# Patient Record
Sex: MALE | Race: WHITE | NOT HISPANIC OR LATINO | Employment: FULL TIME | URBAN - METROPOLITAN AREA
[De-identification: names, ages, dates, MRNs, and addresses within clinical notes are randomized per-mention and may not be internally consistent; named-entity substitution may affect disease eponyms.]

---

## 2021-09-15 ENCOUNTER — OFFICE VISIT (OUTPATIENT)
Dept: ENDOCRINOLOGY | Facility: CLINIC | Age: 31
End: 2021-09-15
Payer: COMMERCIAL

## 2021-09-15 VITALS
DIASTOLIC BLOOD PRESSURE: 80 MMHG | SYSTOLIC BLOOD PRESSURE: 130 MMHG | HEART RATE: 88 BPM | HEIGHT: 68 IN | OXYGEN SATURATION: 99 % | WEIGHT: 285.4 LBS | BODY MASS INDEX: 43.26 KG/M2

## 2021-09-15 DIAGNOSIS — E66.01 MORBID OBESITY (H): Primary | ICD-10-CM

## 2021-09-15 PROCEDURE — 99202 OFFICE O/P NEW SF 15 MIN: CPT | Performed by: SURGERY

## 2021-09-15 RX ORDER — BUPROPION HYDROCHLORIDE 300 MG/1
300 TABLET ORAL EVERY MORNING
COMMUNITY
End: 2021-10-22

## 2021-09-15 RX ORDER — DULOXETIN HYDROCHLORIDE 30 MG/1
30 CAPSULE, DELAYED RELEASE ORAL DAILY
COMMUNITY
End: 2021-10-22

## 2021-09-15 ASSESSMENT — ENCOUNTER SYMPTOMS
POLYPHAGIA: 1
DECREASED APPETITE: 0
FATIGUE: 1
CONSTIPATION: 1
VOMITING: 0
MYALGIAS: 0
JOINT SWELLING: 0
BLOOD IN STOOL: 0
CHILLS: 0
WEIGHT GAIN: 1
WHEEZING: 0
HALLUCINATIONS: 0
ARTHRALGIAS: 0
NECK PAIN: 0
JAUNDICE: 0
BACK PAIN: 1
COUGH: 0
RECTAL PAIN: 0
BLOATING: 1
WEIGHT LOSS: 0
NIGHT SWEATS: 1
INCREASED ENERGY: 0
ABDOMINAL PAIN: 0
MUSCLE CRAMPS: 0
DECREASED LIBIDO: 0
NAUSEA: 0
SNORES LOUDLY: 1
SPUTUM PRODUCTION: 0
DYSPNEA ON EXERTION: 0
SHORTNESS OF BREATH: 1
HEARTBURN: 0
POSTURAL DYSPNEA: 1
DIARRHEA: 0
COUGH DISTURBING SLEEP: 0
POLYDIPSIA: 1
BOWEL INCONTINENCE: 0
HOT FLASHES: 1
STIFFNESS: 0
ALTERED TEMPERATURE REGULATION: 1
FEVER: 0
MUSCLE WEAKNESS: 0
HEMOPTYSIS: 0

## 2021-09-15 ASSESSMENT — MIFFLIN-ST. JEOR: SCORE: 2055.13

## 2021-09-15 ASSESSMENT — PAIN SCALES - GENERAL: PAINLEVEL: NO PAIN (0)

## 2021-09-15 NOTE — NURSING NOTE
"Chief Complaint   Patient presents with     Consult     Consultation Bariatric Surgery had Adjustable band removed       Vitals:    09/15/21 0711   BP: 130/80   BP Location: Left arm   Patient Position: Chair   Cuff Size: Adult Large   Pulse: 88   SpO2: 99%   Weight: 129.5 kg (285 lb 6.4 oz)   Height: 1.715 m (5' 7.5\")       Body mass index is 44.04 kg/m .                            "

## 2021-09-15 NOTE — PROGRESS NOTES
This is a 31-year-old gentleman who underwent placement of a laparoscopic adjustable band in [place] in 2009. Last year while traveling on vacation he did have signs and symptoms of obstruction so in early 2001 he had the band removed in [place]. Since that time he has gained a significant amount of weight almost 90 pounds. He and his wife are currently expecting twins that are due in mid March. He denies diabetes or hypertension but has a strong history of these with a significant history of diabetes from his father who has also had a heart attack. The patient is interested in losing weight and eager to consider surgery. On physical examination the patient has a subxiphoid horizontal scar of approximately 3 cm with other port site scars that are well-healed.    Assessment and plan we will need to reviewed the patient's previous operative note he will obtain this from [place]. In addition I like to have him see Estela in our clinic for medical weight loss. He may be an excellent candidate for some of the GLP-1 analogs. In addition to that I like to perform an upper endoscopy to evaluate his stomach. We will give medical management first priority and trying to help him achieve his weight loss goals. If however he is not successful or plateaus early we will consider conversion to another procedure. It is unclear to me what the best pathway is. Much of this will be dictated by risk assessment. We will plan to see him back in clinic after his initial work-up with the operative note and the results of the endoscopy.

## 2021-09-15 NOTE — PATIENT INSTRUCTIONS
Your provider has ordered an Upper Endoscopy (EGD).  Please contact [facility] after 9/17/21, at [phone],  to schedule.

## 2021-09-16 ENCOUNTER — TELEPHONE (OUTPATIENT)
Dept: SURGERY | Facility: CLINIC | Age: 31
End: 2021-09-16

## 2021-09-21 ENCOUNTER — TELEPHONE (OUTPATIENT)
Dept: GASTROENTEROLOGY | Facility: CLINIC | Age: 31
End: 2021-09-21

## 2021-09-21 DIAGNOSIS — Z11.59 ENCOUNTER FOR SCREENING FOR OTHER VIRAL DISEASES: ICD-10-CM

## 2021-09-21 NOTE — TELEPHONE ENCOUNTER
Screening Questions  1. Are you active on mychart? NO    2. What insurance is in the chart? [employer]    2.  Ordering/Referring Provider: Kameron Sanchez MD in [facility]    3. BMI 44.6    4. Are you on daily home oxygen? NO    5. Do you have a history of difficult airway? NO    6. Have you had a heart, lung, or liver transplant? NO    7. Are you currently on dialysis or have chronic kidney disease? NO    8. Have you had a stroke or Transient ischemic atttack (TIA) within 6 months? NO    9. In the past 6 months, have you had any heart related issues including cardiomyopathy or heart attack?         If yes, did it require cardiac stenting or other implantable device?NO    10. Do you have any implantable devices in your body (pacemaker, defib, LVAD)? NO    11. Do you take nitroglycerin? If yes, how often? NO    12. Are you currently taking any blood thinners?NO    13. Are you a diabetic? NO    14. (Females) Are you currently pregnant?   If yes, how many weeks?    15. Have you had a procedure in the past that was difficult to tolerate with conscious sedation? Any allergies to Fentanyl or Versed NO    16. Are you taking any scheduled prescription narcotics more than once daily? NO    17. Do you have any chemical dependencies such as alcohol, street drugs, or methadone? NO    18. Do you have any history of post-traumatic stress syndrome or mental health issues? NO    19. Do you transfer independently? YES    20.  Do you have any issues with constipation? NO    21. Preferred Pharmacy for Pre Prescription [facility]     Scheduling Details    Which Colonoscopy Prep was Sent?:   Procedure Scheduled: EGD  Provider/Surgeon: Dr. Durham  Date of Procedure: 10/07/2021  Location: [facility]  Caller (Please ask for phone number if not scheduled by patient): [facility]      Sedation Type: CS  Conscious Sedation- Needs  for 6 hours after the procedure  MAC/General-Needs  for 24 hours after procedure    Pre-op  Required at [facility] and OR for MAC sedation:   (if yes advise patient they will need a pre-op prior to procedure)      Is patient on blood thinners? -NO (If yes- inform patient to follow up with PCP or provider for follow up instructions)     Informed patient they will need an adult  YES  Cannot take any type of public or medical transportation alone    [facility] or [facility]ternally: YES    Confirmed Nurse will call to complete assessment YES    Additional comments: NO

## 2021-09-28 ENCOUNTER — TELEPHONE (OUTPATIENT)
Dept: GASTROENTEROLOGY | Facility: CLINIC | Age: 31
End: 2021-09-28

## 2021-09-28 NOTE — TELEPHONE ENCOUNTER
Covid test scheduled: 10-3    Arrival time: 1430    Facility location: [facility]    Sedation type: conscious    Bowel prep recommendation: N/a    Amy Branham RN    Instructions, policy, conscious sedation plan reviewed. Instructed patient to have someone stay with them for 6 hours post exam.

## 2021-10-06 ENCOUNTER — LAB (OUTPATIENT)
Dept: LAB | Facility: CLINIC | Age: 31
End: 2021-10-06
Attending: INTERNAL MEDICINE
Payer: COMMERCIAL

## 2021-10-06 DIAGNOSIS — Z11.59 ENCOUNTER FOR SCREENING FOR OTHER VIRAL DISEASES: ICD-10-CM

## 2021-10-06 LAB — SARS-COV-2 RNA RESP QL NAA+PROBE: NEGATIVE

## 2021-10-06 PROCEDURE — U0005 INFEC AGEN DETEC AMPLI PROBE: HCPCS

## 2021-10-06 PROCEDURE — U0003 INFECTIOUS AGENT DETECTION BY NUCLEIC ACID (DNA OR RNA); SEVERE ACUTE RESPIRATORY SYNDROME CORONAVIRUS 2 (SARS-COV-2) (CORONAVIRUS DISEASE [COVID-19]), AMPLIFIED PROBE TECHNIQUE, MAKING USE OF HIGH THROUGHPUT TECHNOLOGIES AS DESCRIBED BY CMS-2020-01-R: HCPCS

## 2021-10-07 ENCOUNTER — HOSPITAL ENCOUNTER (OUTPATIENT)
Facility: CLINIC | Age: 31
Discharge: HOME | End: 2021-10-07
Attending: INTERNAL MEDICINE | Admitting: INTERNAL MEDICINE
Payer: COMMERCIAL

## 2021-10-07 VITALS
HEART RATE: 77 BPM | BODY MASS INDEX: 44.98 KG/M2 | WEIGHT: 286.6 LBS | RESPIRATION RATE: 12 BRPM | DIASTOLIC BLOOD PRESSURE: 112 MMHG | OXYGEN SATURATION: 97 % | HEIGHT: 67 IN | SYSTOLIC BLOOD PRESSURE: 138 MMHG | TEMPERATURE: 97.3 F

## 2021-10-07 DIAGNOSIS — E66.01 MORBID OBESITY (H): Primary | ICD-10-CM

## 2021-10-07 LAB — UPPER GI ENDOSCOPY: NORMAL

## 2021-10-07 PROCEDURE — G0500 MOD SEDAT ENDO SERVICE >5YRS: HCPCS | Performed by: INTERNAL MEDICINE

## 2021-10-07 PROCEDURE — 250N000009 HC RX 250: Performed by: INTERNAL MEDICINE

## 2021-10-07 PROCEDURE — 250N000011 HC RX IP 250 OP 636: Performed by: INTERNAL MEDICINE

## 2021-10-07 PROCEDURE — 43235 EGD DIAGNOSTIC BRUSH WASH: CPT | Performed by: INTERNAL MEDICINE

## 2021-10-07 RX ORDER — PROCHLORPERAZINE MALEATE 10 MG
10 TABLET ORAL EVERY 6 HOURS PRN
Status: DISCONTINUED | OUTPATIENT
Start: 2021-10-07 | End: 2021-10-07 | Stop reason: HOSPADM

## 2021-10-07 RX ORDER — FENTANYL CITRATE 50 UG/ML
INJECTION, SOLUTION INTRAMUSCULAR; INTRAVENOUS PRN
Status: DISCONTINUED | OUTPATIENT
Start: 2021-10-07 | End: 2021-10-07 | Stop reason: HOSPADM

## 2021-10-07 RX ORDER — NALOXONE HYDROCHLORIDE 0.4 MG/ML
0.2 INJECTION, SOLUTION INTRAMUSCULAR; INTRAVENOUS; SUBCUTANEOUS
Status: DISCONTINUED | OUTPATIENT
Start: 2021-10-07 | End: 2021-10-07 | Stop reason: HOSPADM

## 2021-10-07 RX ORDER — FLUMAZENIL 0.1 MG/ML
0.2 INJECTION, SOLUTION INTRAVENOUS
Status: DISCONTINUED | OUTPATIENT
Start: 2021-10-07 | End: 2021-10-07 | Stop reason: HOSPADM

## 2021-10-07 RX ORDER — ONDANSETRON 2 MG/ML
4 INJECTION INTRAMUSCULAR; INTRAVENOUS
Status: DISCONTINUED | OUTPATIENT
Start: 2021-10-07 | End: 2021-10-07 | Stop reason: HOSPADM

## 2021-10-07 RX ORDER — ONDANSETRON 2 MG/ML
4 INJECTION INTRAMUSCULAR; INTRAVENOUS EVERY 6 HOURS PRN
Status: DISCONTINUED | OUTPATIENT
Start: 2021-10-07 | End: 2021-10-07 | Stop reason: HOSPADM

## 2021-10-07 RX ORDER — NALOXONE HYDROCHLORIDE 0.4 MG/ML
0.4 INJECTION, SOLUTION INTRAMUSCULAR; INTRAVENOUS; SUBCUTANEOUS
Status: DISCONTINUED | OUTPATIENT
Start: 2021-10-07 | End: 2021-10-07 | Stop reason: HOSPADM

## 2021-10-07 RX ORDER — LIDOCAINE 40 MG/G
CREAM TOPICAL
Status: DISCONTINUED | OUTPATIENT
Start: 2021-10-07 | End: 2021-10-07 | Stop reason: HOSPADM

## 2021-10-07 RX ORDER — ONDANSETRON 4 MG/1
4 TABLET, ORALLY DISINTEGRATING ORAL EVERY 6 HOURS PRN
Status: DISCONTINUED | OUTPATIENT
Start: 2021-10-07 | End: 2021-10-07 | Stop reason: HOSPADM

## 2021-10-07 RX ADMIN — MIDAZOLAM 1 MG: 1 INJECTION INTRAMUSCULAR; INTRAVENOUS at 15:44

## 2021-10-07 RX ADMIN — FENTANYL CITRATE 50 MCG: 50 INJECTION, SOLUTION INTRAMUSCULAR; INTRAVENOUS at 15:39

## 2021-10-07 RX ADMIN — MIDAZOLAM 2 MG: 1 INJECTION INTRAMUSCULAR; INTRAVENOUS at 15:38

## 2021-10-07 RX ADMIN — MIDAZOLAM 1 MG: 1 INJECTION INTRAMUSCULAR; INTRAVENOUS at 15:47

## 2021-10-07 RX ADMIN — FENTANYL CITRATE 25 MCG: 50 INJECTION, SOLUTION INTRAMUSCULAR; INTRAVENOUS at 15:44

## 2021-10-07 RX ADMIN — TOPICAL ANESTHETIC 1 SPRAY: 200 SPRAY DENTAL; PERIODONTAL at 15:38

## 2021-10-07 RX ADMIN — MIDAZOLAM 1 MG: 1 INJECTION INTRAMUSCULAR; INTRAVENOUS at 15:42

## 2021-10-07 ASSESSMENT — MIFFLIN-ST. JEOR: SCORE: 2218.63

## 2021-10-07 NOTE — OR NURSING
Procedure: Upper Endoscopy  Sedation: Conscious sedation (75 mcg fentanyl, 5 mg versed)  O2: 2 LPM NC during procedure  Tolerated: VS stable during and post procedure. Not c/o abdominal or chest pain.  Report: Given to Becky WILLSON  Pt to recovery area in stable condition, accompanied by DEMETRIS Daniels RN

## 2021-10-21 ENCOUNTER — TELEPHONE (OUTPATIENT)
Dept: ENDOCRINOLOGY | Facility: CLINIC | Age: 31
End: 2021-10-21

## 2021-10-21 NOTE — TELEPHONE ENCOUNTER
Patient interested in weight loss surgery    Alcides Beauchamp    Instructed to check with insurance company regarding exclusions prior to first appt.    Scheduled to see CNP or TELLY at 8:00     Spoke to Patient:12:21 pm      Can find information on bariatrix products at: [email or web address]    Instructed to view seminar and complete pre-visit questionnaire prior to visit at [email or web address].    [phone] contact center phone number      TRISTON VILLANUEVA CMA

## 2021-10-22 ENCOUNTER — TELEPHONE (OUTPATIENT)
Dept: ENDOCRINOLOGY | Facility: CLINIC | Age: 31
End: 2021-10-22

## 2021-10-22 ENCOUNTER — OFFICE VISIT (OUTPATIENT)
Dept: ENDOCRINOLOGY | Facility: CLINIC | Age: 31
End: 2021-10-22
Payer: COMMERCIAL

## 2021-10-22 ENCOUNTER — LAB (OUTPATIENT)
Dept: LAB | Facility: CLINIC | Age: 31
End: 2021-10-22
Payer: COMMERCIAL

## 2021-10-22 VITALS
DIASTOLIC BLOOD PRESSURE: 90 MMHG | OXYGEN SATURATION: 98 % | BODY MASS INDEX: 44.72 KG/M2 | WEIGHT: 295.1 LBS | HEIGHT: 68 IN | HEART RATE: 88 BPM | SYSTOLIC BLOOD PRESSURE: 143 MMHG

## 2021-10-22 DIAGNOSIS — E66.01 MORBID OBESITY (H): ICD-10-CM

## 2021-10-22 DIAGNOSIS — E66.01 CLASS 3 SEVERE OBESITY DUE TO EXCESS CALORIES WITH SERIOUS COMORBIDITY AND BODY MASS INDEX (BMI) OF 45.0 TO 49.9 IN ADULT (H): Primary | ICD-10-CM

## 2021-10-22 DIAGNOSIS — E66.813 CLASS 3 SEVERE OBESITY DUE TO EXCESS CALORIES WITH SERIOUS COMORBIDITY AND BODY MASS INDEX (BMI) OF 45.0 TO 49.9 IN ADULT (H): Primary | ICD-10-CM

## 2021-10-22 LAB
ALBUMIN SERPL-MCNC: 3.8 G/DL (ref 3.4–5)
ALP SERPL-CCNC: 82 U/L (ref 40–150)
ALT SERPL W P-5'-P-CCNC: 83 U/L (ref 0–70)
ANION GAP SERPL CALCULATED.3IONS-SCNC: 4 MMOL/L (ref 3–14)
AST SERPL W P-5'-P-CCNC: 34 U/L (ref 0–45)
BILIRUB SERPL-MCNC: 0.3 MG/DL (ref 0.2–1.3)
BUN SERPL-MCNC: 13 MG/DL (ref 7–30)
CALCIUM SERPL-MCNC: 9.3 MG/DL (ref 8.5–10.1)
CHLORIDE BLD-SCNC: 108 MMOL/L (ref 94–109)
CHOLEST SERPL-MCNC: 221 MG/DL
CO2 SERPL-SCNC: 29 MMOL/L (ref 20–32)
CREAT SERPL-MCNC: 0.72 MG/DL (ref 0.66–1.25)
DEPRECATED CALCIDIOL+CALCIFEROL SERPL-MC: 12 UG/L (ref 20–75)
ERYTHROCYTE [DISTWIDTH] IN BLOOD BY AUTOMATED COUNT: 13.3 % (ref 10–15)
FASTING STATUS PATIENT QL REPORTED: YES
GFR SERPL CREATININE-BSD FRML MDRD: >90 ML/MIN/1.73M2
GLUCOSE BLD-MCNC: 89 MG/DL (ref 70–99)
HBA1C MFR BLD: 5.7 % (ref 0–5.6)
HCT VFR BLD AUTO: 44.7 % (ref 40–53)
HDLC SERPL-MCNC: 51 MG/DL
HGB BLD-MCNC: 14.7 G/DL (ref 13.3–17.7)
LDLC SERPL CALC-MCNC: 144 MG/DL
MCH RBC QN AUTO: 26.6 PG (ref 26.5–33)
MCHC RBC AUTO-ENTMCNC: 32.9 G/DL (ref 31.5–36.5)
MCV RBC AUTO: 81 FL (ref 78–100)
NONHDLC SERPL-MCNC: 170 MG/DL
PLATELET # BLD AUTO: 319 10E3/UL (ref 150–450)
POTASSIUM BLD-SCNC: 4.1 MMOL/L (ref 3.4–5.3)
PROT SERPL-MCNC: 7.8 G/DL (ref 6.8–8.8)
PTH-INTACT SERPL-MCNC: 25 PG/ML (ref 18–80)
RBC # BLD AUTO: 5.52 10E6/UL (ref 4.4–5.9)
SODIUM SERPL-SCNC: 141 MMOL/L (ref 133–144)
TRIGL SERPL-MCNC: 130 MG/DL
TSH SERPL DL<=0.005 MIU/L-ACNC: 1.39 MU/L (ref 0.4–4)
WBC # BLD AUTO: 7.4 10E3/UL (ref 4–11)

## 2021-10-22 PROCEDURE — 80061 LIPID PANEL: CPT | Performed by: PATHOLOGY

## 2021-10-22 PROCEDURE — 36415 COLL VENOUS BLD VENIPUNCTURE: CPT | Performed by: PATHOLOGY

## 2021-10-22 PROCEDURE — 99000 SPECIMEN HANDLING OFFICE-LAB: CPT | Performed by: PATHOLOGY

## 2021-10-22 PROCEDURE — 83970 ASSAY OF PARATHORMONE: CPT | Mod: 90 | Performed by: PATHOLOGY

## 2021-10-22 PROCEDURE — 83036 HEMOGLOBIN GLYCOSYLATED A1C: CPT | Performed by: PATHOLOGY

## 2021-10-22 PROCEDURE — 80053 COMPREHEN METABOLIC PANEL: CPT | Performed by: PATHOLOGY

## 2021-10-22 PROCEDURE — 99215 OFFICE O/P EST HI 40 MIN: CPT | Performed by: PHYSICIAN ASSISTANT

## 2021-10-22 PROCEDURE — 85027 COMPLETE CBC AUTOMATED: CPT | Performed by: PATHOLOGY

## 2021-10-22 PROCEDURE — 82306 VITAMIN D 25 HYDROXY: CPT | Mod: 90 | Performed by: PATHOLOGY

## 2021-10-22 PROCEDURE — 84443 ASSAY THYROID STIM HORMONE: CPT | Performed by: PATHOLOGY

## 2021-10-22 RX ORDER — DULOXETIN HYDROCHLORIDE 30 MG/1
30 CAPSULE, DELAYED RELEASE ORAL DAILY
COMMUNITY
Start: 2021-10-22 | End: 2023-01-11

## 2021-10-22 ASSESSMENT — MIFFLIN-ST. JEOR: SCORE: 2265.12

## 2021-10-22 ASSESSMENT — PAIN SCALES - GENERAL: PAINLEVEL: NO PAIN (0)

## 2021-10-22 NOTE — LETTER
"10/22/2021       RE: Alcides Beauchamp  [address]  [facility] [address]     Dear Colleague,    Thank you for referring your patient, Alcides Beauchamp, to the [facility]. Please see a copy of my visit note below.    40 minutes spent on the date of the encounter doing chart review, history and exam, documentation and further activities per the note    New Bariatric Surgery Consultation Note    2021    RE: Alcides Beauchamp  MR#: [id number]  : 1990      Referring provider:       10/21/2021   Who referred you? asnjay [email or web address]       Chief Complaint/Reason for visit: evaluation for possible weight loss surgery    Dear Jami, Sanjay Tilley MD (General),    I had the pleasure of seeing your patient, Alcides Beauchamp, to evaluate his obesity and consider him for possible weight loss surgery. As you know, Alcides Beauchamp is 31 year old.  He has a height of 5' 7.5\", a weight of 295 lbs 1.6 oz, and calculated Body mass index is 45.54 kg/m .    Assessment & Plan   Problem List Items Addressed This Visit        Endocrine Diagnoses    Morbid obesity (H) - Primary          HISTORY OF PRESENT ILLNESS:  Weight Loss History Reviewed with Patient 10/21/2021   How long have you been overweight? Since puberty   What is the most that you have ever weighed? 300   What is the most weight you have lost? 120   I have tried the following methods to lose weight Weight Loss Surgery   I have tried the following weight loss medications? (Check all that apply) -   Have you ever had weight loss surgery? Yes   Please select the type of weight loss surgery you had (select all that apply): Lap band [place]  and removal  in [place]   Lost 130 lbs with lap band placed in [place], removed in  and has gained almost 100 lbs in the last year.  Regained 90 lbs since lap band removal   EGD 10/7/21 Dr Durham Large Type III paraesophageal " hernia  Recalls trying phentermine/topiramate with PCP that he reports wasn't effective  A1C 5.3 2/16/21, need recheck      PLAN:  Plan for possible sleeve gastrectomy with hiatal hernia repiar, to discuss with Dr Sanchez  Labs today 1st floor  Consider Ozempic if preDM or Wegovy if A1C normal  RD visit soon (oct) video  RD visit in 1 month  Dr Sanchez in 1 month in person   [facility] in 3 months  Call to schedule psych eval asap      CO-MORBIDITIES OF OBESITY INCLUDE:     10/21/2021   I have the following health issues associated with obesity: Heart Disease, High Blood Pressure       PAST MEDICAL HISTORY:  No past medical history on file.    PAST SURGICAL HISTORY:  Past Surgical History:   Procedure Laterality Date     ESOPHAGOSCOPY, GASTROSCOPY, DUODENOSCOPY (EGD), COMBINED N/A 10/7/2021    Procedure: ESOPHAGOGASTRODUODENOSCOPY (EGD);  Surgeon: Liss Durham MD;  Location: [facility]       FAMILY HISTORY:   No family history on file.    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 No   Who do you have in your support network that can be available to help you for the first 2 weeks after surgery? wife and family   Who can you count on for support throughout your weight loss surgery journey? wife and family   Can you afford 3 meals a day?  No   Can you afford 50-60 dollars a month for vitamins? Yes       HABITS:     10/21/2021   How often do you drink alcohol? Monthly or less   If you do drink alcohol, how many drinks might you have in a day? (one drink = 5 oz. wine, 1 can/bottle of beer, 1 shot liquor) 1 or 2   Do you currently use any of the following Nicotine products? e-cigarettes   Have you ever used any of the following nicotine products? E-cigarettes    If you previously used any of these products, what year did you quit? -   Have you or are you currently using street drugs or prescription strength medication for which you do not have a prescription for? No   Do you have a history of chemical dependency (alcohol or drug abuse)? No     Currently taking narcotic/opioids No    PSYCHOLOGICAL HISTORY:   Psychological History Reviewed With Patient 10/21/2021   Have you ever attempted suicide? Never.   Have you had thoughts of suicide in the past year? No   Have you ever been hospitalized for mental illness or a suicide attempt? Never.   Do you have a history of chronic pain? No   Have you ever been diagnosed with fibromyalgia? No   Are you currently being treated for any of the following? (select all that apply) Anxiety, I take medication or see a therapist for another mental illness   Are you currently seeing a therapist or counselor?  No   Are you currently seeing a psychiatrist? Yes       ROS:     10/21/2021   Skin:  None of the above   HEENT: Missing teeth   If you answered yes to missing teeth, please indicate how many: 3   Musculoskeletal: None of the above   Cardiovascular: None of the above   Pulmonary: Snoring, Experience morning headaches   Gastrointestinal: Heartburn, Reflux, Constipation   Genitourinary: None of the above   Hematological: Family history of blood clots   Neurological: None of the above   Female only: -       EATING BEHAVIORS:     10/21/2021   Have you or anyone else thought that you had an eating disorder? No   Do you currently binge eat (eat a large amount of food in a short time)? No   Are you an emotional eater? No   Do you get up to eat after falling asleep? No       EXERCISE:     10/21/2021   How often do you exercise? Never   What is the duration of your exercise (in minutes)? -   What types of exercise do you do? -   What keeps you from being more active?  My ability to walk or move around is limited, Lack of Time        MEDICATIONS:  Current Outpatient Medications   Medication Sig Dispense Refill     DULoxetine (CYMBALTA) 30 MG capsule Take 1 capsule (30 mg) by mouth daily       semaglutide (OZEMPIC) 2 MG/1.5ML SOPN pen Inject 0.25 mg subcutaneously once weekly for 4 weeks then 0.5 mg once weekly. 3 mL 1       ALLERGIES:  No Known Allergies    Admission on 10/07/2021, Discharged on 10/07/2021   Component Date Value Ref Range Status     Upper GI Endoscopy 10/07/2021    Final                    Value:[address] [place] [address] [phone]     Endoscopy Department  _______________________________________________________________________________  Patient Name: Alcides Mfarrej             Procedure Date: 10/7/2021 3:07 PM  MRN: [id number]                       Account Number: [id number]  YOB: 1990              Admit Type: Outpatient  Age: 31                               Room:  #2                      Gender: Male                          Note Status: Finalized  Attending MD: Liss Durham MD  Total Sedation Time: 12 minutes  _______________________________________________________________________________     Procedure:           Upper GI endoscopy  Indications:         Postoperative assessment  Providers:           Liss Durham MD, Shira Daniels RN, Marcela Colón,                        Ap Mclean MD  Patient Profile:     This is a 31 year old male with Hx of gastric lap band                        who                           is coming for EGD after lap band removal.  Referring MD:        Kameron Sanchez MD  Medicines:           Midazolam 5 mg IV, Fentanyl 75 micrograms IV, Benzocaine                        spray  Complications:       No immediate complications.  _______________________________________________________________________________  Procedure:           Pre-Anesthesia Assessment:                       - Prior to the procedure, a  History and Physical was                        performed, and patient medications and allergies were                        reviewed. The patient is competent. The risks and                        benefits of the procedure and the sedation options and                        risks were discussed with the patient. All questions                        were answered and informed consent was obtained. Patient                        identification and proposed procedure were verified by                        the physician in the procedure room. Mental Status                                                  Examination: alert and oriented. Airway Examination:                        normal oropharyngeal airway and neck mobility.                        Respiratory Examination: clear to auscultation. CV                        Examination: normal. Prophylactic Antibiotics: The                        patient does not require prophylactic antibiotics. Prior                        Anticoagulants: The patient has taken no previous                        anticoagulant or antiplatelet agents. ASA Grade                        Assessment: II - A patient with mild systemic disease.                        After reviewing the risks and benefits, the patient was                        deemed in satisfactory condition to undergo the                        procedure. The anesthesia plan was to use moderate                        sedation / analgesia (conscious sedation). Immediately                        prior to administration of medications, the patient was                        re-assessed fo                          r adequacy to receive sedatives. The heart                        rate, respiratory rate, oxygen saturations, blood                        pressure, adequacy of pulmonary ventilation, and                        response to care were monitored throughout the                        procedure. The physical status of the  patient was                        re-assessed after the procedure.                       After obtaining informed consent, the endoscope was                        passed under direct vision. Throughout the procedure,                        the patient's blood pressure, pulse, and oxygen                        saturations were monitored continuously. The Endoscope                        was introduced through the mouth, and advanced to the                        second part of duodenum. The upper GI endoscopy was                        accomplished without difficulty. The patient tolerated                        the procedure well.                                                                                                             Findings:       The Z-line was regular and was found 39 cm from the incisors.       A large type III paraesophageal hernia was found. The proximal extent of        the gastric folds (end of tubular esophagus) was 43 cm from the        incisors. The hiatal narrowing was 39 cm from the incisors. The Z-line        was 39 cm from the incisors.       No gross lesions were noted in the entire examined stomach.       The examined duodenum was normal.                                                                                   Moderate Sedation:       Moderate (conscious) sedation was administered by the endoscopy nurse        and supervised by the endoscopist. The patient's oxygen saturation,        heart rate, blood pressure and response to care were monitored. Total        physician intraservice time was 12 minutes.  Impression:          - Z-line regular, 39 cm from the incisors.                       - Large type III paraesophageal hernia.                                                 - No gross lesions in the stomach.                       - Normal examined duodenum.                       - No specimens collected.  Recommendation:      - Discharge patient to home.                "        - Resume previous diet.                       - Return to referring physician as previously scheduled.                                                                                     electronically signed by Liss Durham  ______________________  Liss Durham MD  10/7/2021 4:01:34 PM  I was physically present for the entire viewing portion of the exam.  __________________________  Signature of teaching physician  [id number] Durham MD    ________________  Ap Mclean MD  Number of Addenda: 0    Note Initiated On: 10/7/2021 3:07 PM  Scope In:  Scope Out:           PHYSICAL EXAM:  Objective    BP (!) 143/90 (BP Location: Left arm, Patient Position: Chair, Cuff Size: Adult Large)   Pulse 88   Ht 1.715 m (5' 7.5\")   Wt 133.9 kg (295 lb 1.6 oz)   SpO2 98%   BMI 45.54 kg/m    Body mass index is 45.54 kg/m .  Physical Exam   GENERAL: Healthy, alert and no distress  EYES: Eyes grossly normal to inspection.  No discharge or erythema, or obvious scleral/conjunctival abnormalities.  RESP: No audible wheeze, cough, or visible cyanosis.  No visible retractions or increased work of breathing.    SKIN: Visible skin clear. No significant rash, abnormal pigmentation or lesions.  NEURO: Cranial nerves grossly intact.  Mentation and speech appropriate for age.  PSYCH: Mentation appears normal, affect normal/bright, judgement and insight intact, normal speech and appearance well-groomed.      In summary, Alcides Beauchamp has Class III obesity with a body mass index of Body mass index is 45.54 kg/m . kg/m2 and the comorbidities stated above. He completed an informational seminar and is a possible candidate for the laparoscopic gastric sleeve.  He will have to complete the following pre-requisites:    If you have not already watched our online seminar please go to [email or web address]    Weight loss requirement: 10 lbs-20 lbs  prior to surgery. Discuss with Dr Sanchez.  Will have final weight " check 2-3 weeks prior to surgery at anesthesia or nurse pre-op teaching visit.      Bariatric labs ordered, call for a lab only appointment at any [facility] lab. To find a lab location near you, please call [phone]. Please let us know if orders need to be faxed to a non [facility] lab.    Schedule bariatric psych eval as soon as possible.  List of psychologists will be sent to you via [employer] or given to you in clinic.     Call [facility] at [phone] to discuss insurance coverage for bariatric surgery.  Please check with your insurance regarding bariatric surgery coverage also. [facility] can also help you with scheduling psych eval if you are having difficulties.    The following clearance letters are needed: Letters will be sent to you via [employer] or given to you in clinic  - Letter of support from primary care provider. Provider can submit through electronic medical record or fax to [phone]    Smoking cessation and nicotine test needed: No      NEXT VISITS: A  should reach out to you to schedule the following appointments.  If they do not reach you please call [phone] to schedule the following appointments:    -See dietitian in 1 month and monthly for 3 months    -See Lauren Bloch [facility] pharmacist in 1 month to follow up on weight loss medications    -See Buffy in 3 months to follow up on pre-op weight loss and weight loss medications    -See Dr Sanchez in 1 month for bariatric surgeon visit. Discuss bariatric surgery.  Important items to discuss: hx of lap band removal and paraesophageal hernia.    Today in the office we discussed gastric sleeve surgery. Preoperative, perioperative, and postoperative processes, management, and follow up were addressed.  Risks and benefits were outlined including the risk of death, staple line leak (1-2%), PE, DVT, ulcer, worsening GERD, N/V, stricture, hernia, wound infection, weight regain, and vitamin deficiencies. I emphasized exercise  and activity along with appropriate food choice as the main foundation for weight loss with surgery providing surgical reinforcement of this.  All questions were answered.  A goal sheet and support group handout were given to the patient.      Once the patient has completed the requirements in their task list and there are no further recommendations, the pt will be allowed to see the surgeon of her choice for consultation on the laparoscopic gastric sleeve surgery. Patient verbalizes understanding of the process to surgery and expectations for the postoperative period including the need for lifelong lifestyle changes, vitamin supplementation, and laboratory monitoring.    Sincerely,     Buffy Forbes PA-C

## 2021-10-22 NOTE — TELEPHONE ENCOUNTER
Called patient to discuss that his [facility] individual policy does not have coverage for bariatric surgery.   He wanted to know what he should do. I asked him to call [facility] to see if he can get a different policy that will cover bariatric surgery or when open enrollment begins to reach out to other insurance companies to see if he can get a policy that will cover bariatric surgery. He states he will do that.

## 2021-10-22 NOTE — LETTER
2021      TO: Alcides Beauchamp  [address]  [facility] [address]         Dear Mr. Alcides Beauchamp,    Pre-Bariatric Surgery Psychological Assessment Providers    When you set up the appointment, ask for a pre-bariatric surgery evaluation and MMPI testing. Have report faxed to our office at [phone]. They may be booked several weeks in advance, plan ahead. Check with your insurance to see if the psychologist is covered, if not, ask your insurance company for a referral.  Check for a virtual visit.    [facility] [employer]   Little Armstrong, PhD, LP  [phone]  Mariel Antonio, PhD,LP           [phone]  Uma Douglass PhD (cannot accept Medicare) [phone]    [facility]     Available for pre-bariatric surgery health assessments and pre- and post- therapy.     (Let us know to enter a referral if you plan to call this group)  [phone]  Donato Castellanos, PhD   Joshua Wright (Randy) Marj PsDanny  [facility]  Lottie Guerin, PhD [place]  Marisol Quiles, [facility]  Tony Laguerre, Ps   Felicita Dickinson / Tony Hearn PsyD, [facility] Gonzalez, Ps,  [phone]    Hope Hull  Sonia David, PhD, LP  [phone]     Daysi Myers, PhD, LP              [phone]    [facility] (also licensed in [place] for virtual visits )  Ramon BritoyD, ABPP, LP  [phone]    NATALIE Lozoya PsyD,,ABPP [phone]    Bekah Mcmahan PsyD, [phone]    RICKY Lyons  [phone]    [place]  Donato Casas, PhD, LP  [phone]    [place]  Joseline Johnson, PsyD, [phone]    RICKY Calderon, PsyD, LP   [phone]     [place]      Outreach [phone]   Donato Patterson, PhD, LP - ext [phone]  Yazan Crooks PsyD, LP - ext [phone]    [place]  Juan Herrera, PhD,  [phone]  Tyrel Viera PsyD,   [phone]          [facility] Donald Moreau, PhD, [phone]    St. Vinod Crowley PsyD, LP  [phone]            Call updates to DEMETRIS Duong    [phone] ( leave a message)    Last updated: 04/02/2020, 02/25/2021, 3/25/21,7/27/21           Sincerely,      Buffy Forbes PA-C

## 2021-10-22 NOTE — TELEPHONE ENCOUNTER
Patient seen by Buffy Forbes PA-C today for bariatric consultation. I called [facility] to check if policy has coverage for bariatric surgery, policy is [facility] individual. There is no coverage. I asked if the patient could work with our dietitians and was told there is no coverage for those visits as well.

## 2021-10-22 NOTE — PATIENT INSTRUCTIONS
"Labs today 1st floor  Consider Ozempic if preDM or Wegovy if A1C normal  Plan for possible sleeve gastrectomy  RD visit soon (oct) video  RD visit in 1 month  Dr Sanchez in 1 month in person   Bfufy in 3 months  Call to schedule psych eval asap      Please view our Weight Loss Surgery Seminar is at the following website.   [email or web address]  OR  [email or web address]    It is the seminar near the bottom of the page.  \"[facility] Weight Loss Surgery Video\"    It starts with Dr Sanchez speaking.  Please take the quiz after you view it.    When you take the quiz, it documents that you watched it.    Handouts for you to read are at the following links:    Making Your Decision, Understanding Weight Loss Surgery  [email or web address]    A Roadmap to Your Weight Loss Surgery  [email or web address]    Get Well Loop Information  [email or web address]      We can also send you via [employer]:  1. Insurance Coverage (What to Ask).  2. List of psychologists for the psychological evaluation.  3. Letters to give providers when requesting clearances.      For Release of Information to transfer info to or from Where I've Been, go to this website for the forms:  [email or web address]    [email or web address]   Release of information form.      To complete the Authorization to Release Protected Health Information (PDF):  Name: Put in the surgeon's name.  (Dr Richard Moreno / Dr Kameron Sanchez / Dr Angel Pace)  Phone: [phone]   Fax: [phone].  Mailing Address: [address], [address], [place], [place], [address]  Clinic Address: [facility] [facility] [address], [place], [address] (do not use as mailing address)  Delivery Format: Select \"Fax\"  Purpose:  Select \" \"Continuing Care\" and \"Other\" Weight Loss Surgery Evaluation.      For " "Support Group Information:      We offer support groups for patients who are working on weight loss and considering, preparing for or have had weight loss surgery.   There is no cost for this opportunity.  You are invited to attend the?Virtual Support Groups?provided by any of the following locations:    1. [facility] via Microsoft Teams with Amy Benavidez RN  2.   [place] via [employer] with Donald Obrien, PhD, LP  3.   [place] via [employer] with Cristin Baltazar RN  4.   [facility] via [employer] Teams with Cristin Belcher [facility]    The following Support Group information can also be found on our website:  [email or web address]      [facility] Weight Loss Surgery Support Group    [facility] Weight Loss Surgery Support Group  The support group is a patient-lead forum that meets monthly to share experiences, encouragement and education. It is open to those who have had weight loss surgery, are scheduled for surgery, and those who are considering surgery.   WHEN: This group meets on the 3rd Wednesday of each month from 5:00PM - 6:00PM virtually using Microsoft Teams.   FACILITATOR: Led by Amy Benavidez, the program's Clinical Coordinator.   TO REGISTER: Please contact the clinic via [employer] or call the nurse line directly at [phone] to inform our staff that you would like an invite sent to you and to let us know the email you would like the invite sent to. Prior to the meeting, a link with directions on how to join the meeting will be sent to you.    2021 Meetings  January 20: Miguel Chambers RD, LD   February 17: The group will not have a speaker. This will be a time of group support.   March 17: Asmita Akhtar, [facility], [facility], [facility]   April 21: Jonas Lees and Etta will be talking about Mindful Eating and Mind Hunger.   May 19:  No Group this month.   June 16: \"Let's Talk\" a group discussion and time to talk and " "ask questions and learn from one another.  July 21: \"Let's Talk\" a time for the group to share.  August 18: \"Let's Talk\" a time for the group to share.  September 15: \"Let's Talk\" a time for the group to share.  October 20: \"Let's Talk\" a time for the group to share.  November 17: \"Let's Talk\" a time for the group to share. an  December 15: Miguel Chambers RD, LD will speak, Topic TBD.    [facility] Support Groups    Connections: Bariatric Care Support Group?  This is open to all [facility] (and those external to this program) pre- and post- operative bariatric surgery patients as well as their support system.   WHEN: This group meets the 2nd Tuesday of each month from 6:30 PM - 8:00 PM virtually using Microsoft Teams.   FACILITATOR: Led by Donald Obrien, Ph.D who is a Licensed Psychologist with the [facility] Comprehensive Weight Management Program.   TO REGISTER: Please send an email to Donald Obrien, Ph.D., LP at?[email or web address]?if you would like an invitation to the group and to learn about using Microsoft Teams.    2021 Meetings  May 11: Ling Nichole MD, MPH, [facility], [facility] speaking on the topic of Body Contouring Surgery for Bariatric Surgery Patients   Mone 15 (instead of June 8): Talisha Torres RD, WANDA, [facility] Weight Management Program speaking on the topic of Nutritional Labeling   July 13:   August 10:   September 14:   October 12:   November 9:   December 14:     Connections: Post-Operative Bariatric Surgery Support Group  This is a support group for [facility] bariatric patients (and those external to [facility]) who have had bariatric surgery and are at least 3 months post-surgery.  WHEN: This support group meets the 4th Wednesday of the month from 11:00 AM - 12:00 PM virtually using Microsoft Teams.   FACILITATOR: Led by Certified Bariatric Nurse, Cristin Baltazar RN.   TO REGISTER: " Please send an email to Cristin at [email or web address] if you would like an invitation to the group and to learn about using [employer].      [facility] Healthy Lifestyle Virtual Support Group    Healthy Lifestyle Virtual Support Group?  This is 60 minutes of small group guided discussion, support and resources. All are welcome who want a healthy lifestyle.  WHEN: This group meets monthly on a Friday from 12:30 PM - to 1:30 PM virtually using Microsoft Teams.   FACILITATOR: Led by National Board Certified Health , Cristin Belcher [facility].   TO REGISTER: Please send an email to Cristin at? [email or web address] to receive monthly invites to the group or if you have any questions about having a health .  Prior to the meeting, a link with directions on how to join the meeting will be sent to you.    2021 Meetings  January 29: How to Stay Active and Healthy during the Winter Months  February 26: Guest Speaker: Lisa Rg RD, [employer]: What do I Need to Know?  March 19: Guest Speaker: Uma Douglass, Health Psychology Fellow, Finding Health, Happiness and Confidence at Every Size  April 30: Guest Speaker: Eliana Carty RD, Healthy Eating on a Budget  May 21: Open Forum  Jun 25: Self Compassion  July 30: Open Forum: Come share your successes and challenges. We are here to support one another.  August 27: Open Forum  September 24: Sleep and the 7 Types of Rest  October 29: Open Forum  November 19: Gratitude     December 10: Open Forum

## 2021-10-22 NOTE — PROGRESS NOTES
"40 minutes spent on the date of the encounter doing chart review, history and exam, documentation and further activities per the note    New Bariatric Surgery Consultation Note    2021    RE: Alcides Beauchamp  MR#: [id number]  : 1990      Referring provider:       10/21/2021   Who referred you? sanjay bedolla       Chief Complaint/Reason for visit: evaluation for possible weight loss surgery    Dear Sanjay Bedolla MD (General),    I had the pleasure of seeing your patient, Alcides Beauchamp, to evaluate his obesity and consider him for possible weight loss surgery. As you know, Alcides Beauchamp is 31 year old.  He has a height of 5' 7.5\", a weight of 295 lbs 1.6 oz, and calculated Body mass index is 45.54 kg/m .    Assessment & Plan   Problem List Items Addressed This Visit        Endocrine Diagnoses    Morbid obesity (H) - Primary          HISTORY OF PRESENT ILLNESS:  Weight Loss History Reviewed with Patient 10/21/2021   How long have you been overweight? Since puberty   What is the most that you have ever weighed? 300   What is the most weight you have lost? 120   I have tried the following methods to lose weight Weight Loss Surgery   I have tried the following weight loss medications? (Check all that apply) -   Have you ever had weight loss surgery? Yes   Please select the type of weight loss surgery you had (select all that apply): Lap band Octavio  and removal  in [place]   Lost 130 lbs with lap band placed in [place], removed in  and has gained almost 100 lbs in the last year.  Regained 90 lbs since lap band removal   EGD 10/7/21 Dr Durham Large Type III paraesophageal hernia  Recalls trying phentermine/topiramate with PCP that he reports wasn't effective  A1C 5.3 21, need recheck      PLAN:  Plan for possible sleeve gastrectomy with hiatal hernia donald, to discuss with Dr Sanchez  Labs today 1st floor  Consider Ozempic if preDM or Wegovy if A1C normal  RD visit soon " (oct) video  RD visit in 1 month  Dr Sanchez in 1 month in person   Buffy in 3 months  Call to schedule psych eval asap      CO-MORBIDITIES OF OBESITY INCLUDE:     10/21/2021   I have the following health issues associated with obesity: Heart Disease, High Blood Pressure       PAST MEDICAL HISTORY:  No past medical history on file.    PAST SURGICAL HISTORY:  Past Surgical History:   Procedure Laterality Date     ESOPHAGOSCOPY, GASTROSCOPY, DUODENOSCOPY (EGD), COMBINED N/A 10/7/2021    Procedure: ESOPHAGOGASTRODUODENOSCOPY (EGD);  Surgeon: Liss Durham MD;  Location:  GI       FAMILY HISTORY:   No family history on file.    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 No   Who do you have in your support network that can be available to help you for the first 2 weeks after surgery? wife and family   Who can you count on for support throughout your weight loss surgery journey? wife and family   Can you afford 3 meals a day?  No   Can you afford 50-60 dollars a month for vitamins? Yes       HABITS:     10/21/2021   How often do you drink alcohol? Monthly or less   If you do drink alcohol, how many drinks might you have in a day? (one drink = 5 oz. wine, 1 can/bottle of beer, 1 shot liquor) 1 or 2   Do you currently use any of the following Nicotine products? e-cigarettes   Have you ever used any of the following nicotine products? E-cigarettes   If you previously used any of these products, what year did you quit? -   Have you or are you currently using street drugs or prescription strength medication for which you do not have a prescription for? No   Do you have a history of chemical dependency (alcohol or drug abuse)? No     Currently taking  narcotic/opioids No    PSYCHOLOGICAL HISTORY:   Psychological History Reviewed With Patient 10/21/2021   Have you ever attempted suicide? Never.   Have you had thoughts of suicide in the past year? No   Have you ever been hospitalized for mental illness or a suicide attempt? Never.   Do you have a history of chronic pain? No   Have you ever been diagnosed with fibromyalgia? No   Are you currently being treated for any of the following? (select all that apply) Anxiety, I take medication or see a therapist for another mental illness   Are you currently seeing a therapist or counselor?  No   Are you currently seeing a psychiatrist? Yes       ROS:     10/21/2021   Skin:  None of the above   HEENT: Missing teeth   If you answered yes to missing teeth, please indicate how many: 3   Musculoskeletal: None of the above   Cardiovascular: None of the above   Pulmonary: Snoring, Experience morning headaches   Gastrointestinal: Heartburn, Reflux, Constipation   Genitourinary: None of the above   Hematological: Family history of blood clots   Neurological: None of the above   Female only: -       EATING BEHAVIORS:     10/21/2021   Have you or anyone else thought that you had an eating disorder? No   Do you currently binge eat (eat a large amount of food in a short time)? No   Are you an emotional eater? No   Do you get up to eat after falling asleep? No       EXERCISE:     10/21/2021   How often do you exercise? Never   What is the duration of your exercise (in minutes)? -   What types of exercise do you do? -   What keeps you from being more active?  My ability to walk or move around is limited, Lack of Time       MEDICATIONS:  Current Outpatient Medications   Medication Sig Dispense Refill     DULoxetine (CYMBALTA) 30 MG capsule Take 1 capsule (30 mg) by mouth daily       semaglutide (OZEMPIC) 2 MG/1.5ML SOPN pen Inject 0.25 mg subcutaneously once weekly for 4 weeks then 0.5 mg once weekly. 3 mL 1       ALLERGIES:  No Known  Allergies    Admission on 10/07/2021, Discharged on 10/07/2021   Component Date Value Ref Range Status     Upper GI Endoscopy 10/07/2021    Final                    Value:[address] [place] [address] [phone]     Endoscopy Department  _______________________________________________________________________________  Patient Name: Alcides Mfanetaej             Procedure Date: 10/7/2021 3:07 PM  MRN: [id number]                       Account Number: [id number]  YOB: 1990              Admit Type: Outpatient  Age: 31                               Room: [facility]                      Gender: Male                          Note Status: Finalized  Attending MD: Liss Durham MD  Total Sedation Time: 12 minutes  _______________________________________________________________________________     Procedure:           Upper GI endoscopy  Indications:         Postoperative assessment  Providers:           Liss Durham MD, Shira Daniels, DEMETRIS, Marcela Colón,                        Ap Mclean MD  Patient Profile:     This is a 31 year old male with Hx of gastric lap band                        who                           is coming for EGD after lap band removal.  Referring MD:        Kameron Sanchez MD  Medicines:           Midazolam 5 mg IV, Fentanyl 75 micrograms IV, Benzocaine                        spray  Complications:       No immediate complications.  _______________________________________________________________________________  Procedure:           Pre-Anesthesia Assessment:                       - Prior to the procedure, a History and Physical was                        performed, and patient medications and allergies were                        reviewed. The patient is competent. The risks and                        benefits of the procedure and the sedation options and                        risks were discussed with the patient. All  questions                        were answered and informed consent was obtained. Patient                        identification and proposed procedure were verified by                        the physician in the procedure room. Mental Status                                                  Examination: alert and oriented. Airway Examination:                        normal oropharyngeal airway and neck mobility.                        Respiratory Examination: clear to auscultation. CV                        Examination: normal. Prophylactic Antibiotics: The                        patient does not require prophylactic antibiotics. Prior                        Anticoagulants: The patient has taken no previous                        anticoagulant or antiplatelet agents. ASA Grade                        Assessment: II - A patient with mild systemic disease.                        After reviewing the risks and benefits, the patient was                        deemed in satisfactory condition to undergo the                        procedure. The anesthesia plan was to use moderate                        sedation / analgesia (conscious sedation). Immediately                        prior to administration of medications, the patient was                        re-assessed fo                          r adequacy to receive sedatives. The heart                        rate, respiratory rate, oxygen saturations, blood                        pressure, adequacy of pulmonary ventilation, and                        response to care were monitored throughout the                        procedure. The physical status of the patient was                        re-assessed after the procedure.                       After obtaining informed consent, the endoscope was                        passed under direct vision. Throughout the procedure,                        the patient's blood pressure, pulse, and oxygen                         saturations were monitored continuously. The Endoscope                        was introduced through the mouth, and advanced to the                        second part of duodenum. The upper GI endoscopy was                        accomplished without difficulty. The patient tolerated                        the procedure well.                                                                                                             Findings:       The Z-line was regular and was found 39 cm from the incisors.       A large type III paraesophageal hernia was found. The proximal extent of        the gastric folds (end of tubular esophagus) was 43 cm from the        incisors. The hiatal narrowing was 39 cm from the incisors. The Z-line        was 39 cm from the incisors.       No gross lesions were noted in the entire examined stomach.       The examined duodenum was normal.                                                                                   Moderate Sedation:       Moderate (conscious) sedation was administered by the endoscopy nurse        and supervised by the endoscopist. The patient's oxygen saturation,        heart rate, blood pressure and response to care were monitored. Total        physician intraservice time was 12 minutes.  Impression:          - Z-line regular, 39 cm from the incisors.                       - Large type III paraesophageal hernia.                                                 - No gross lesions in the stomach.                       - Normal examined duodenum.                       - No specimens collected.  Recommendation:      - Discharge patient to home.                       - Resume previous diet.                       - Return to referring physician as previously scheduled.                                                                                     electronically signed by Liss Durham  ______________________  Liss Durham MD  10/7/2021 4:01:34 PM  I was  "physically present for the entire viewing portion of the exam.  __________________________  Signature of teaching physician  Danisha/[id number] Durham MD    ________________  Ap Mclean MD  Number of Addenda: 0    Note Initiated On: 10/7/2021 3:07 PM  Scope In:  Scope Out:           PHYSICAL EXAM:  Objective    BP (!) 143/90 (BP Location: Left arm, Patient Position: Chair, Cuff Size: Adult Large)   Pulse 88   Ht 1.715 m (5' 7.5\")   Wt 133.9 kg (295 lb 1.6 oz)   SpO2 98%   BMI 45.54 kg/m    Body mass index is 45.54 kg/m .  Physical Exam   GENERAL: Healthy, alert and no distress  EYES: Eyes grossly normal to inspection.  No discharge or erythema, or obvious scleral/conjunctival abnormalities.  RESP: No audible wheeze, cough, or visible cyanosis.  No visible retractions or increased work of breathing.    SKIN: Visible skin clear. No significant rash, abnormal pigmentation or lesions.  NEURO: Cranial nerves grossly intact.  Mentation and speech appropriate for age.  PSYCH: Mentation appears normal, affect normal/bright, judgement and insight intact, normal speech and appearance well-groomed.      In summary, Alcides Beauchamp has Class III obesity with a body mass index of Body mass index is 45.54 kg/m . kg/m2 and the comorbidities stated above. He completed an informational seminar and is a possible candidate for the laparoscopic gastric sleeve.  He will have to complete the following pre-requisites:    If you have not already watched our online seminar please go to [email or web address]    Weight loss requirement: 10 lbs-20 lbs  prior to surgery. Discuss with Dr Sanchez.  Will have final weight check 2-3 weeks prior to surgery at anesthesia or nurse pre-op teaching visit.      Bariatric labs ordered, call for a lab only appointment at any [facility] lab. To find a lab location near you, please call [phone]. Please let us know if orders need to be faxed to a non [facility] " lab.    Schedule bariatric psych eval as soon as possible.  List of psychologists will be sent to you via [facility] [employer] or given to you in clinic.     Call Collins Tavares at [phone] to discuss insurance coverage for bariatric surgery.  Please check with your insurance regarding bariatric surgery coverage also. Collins can also help you with scheduling psych eval if you are having difficulties.    The following clearance letters are needed: Letters will be sent to you via [employer] or given to you in clinic  - Letter of support from primary care provider. Provider can submit through electronic medical record or fax to [phone]    Smoking cessation and nicotine test needed: No      NEXT VISITS: A  should reach out to you to schedule the following appointments.  If they do not reach you please call [phone] to schedule the following appointments:    -See dietitian in 1 month and monthly for 3 months    -See Lauren Bloch [facility] pharmacist in 1 month to follow up on weight loss medications    -See Buffy in 3 months to follow up on pre-op weight loss and weight loss medications    -See Dr Sanchez in 1 month for bariatric surgeon visit. Discuss bariatric surgery.  Important items to discuss: hx of lap band removal and paraesophageal hernia.    Today in the office we discussed gastric sleeve surgery. Preoperative, perioperative, and postoperative processes, management, and follow up were addressed.  Risks and benefits were outlined including the risk of death, staple line leak (1-2%), PE, DVT, ulcer, worsening GERD, N/V, stricture, hernia, wound infection, weight regain, and vitamin deficiencies. I emphasized exercise and activity along with appropriate food choice as the main foundation for weight loss with surgery providing surgical reinforcement of this.  All questions were answered.  A goal sheet and support group handout were given to the patient.      Once the patient has completed the requirements in their  task list and there are no further recommendations, the pt will be allowed to see the surgeon of her choice for consultation on the laparoscopic gastric sleeve surgery. Patient verbalizes understanding of the process to surgery and expectations for the postoperative period including the need for lifelong lifestyle changes, vitamin supplementation, and laboratory monitoring.    Sincerely,     Buffy Forbes PA-C

## 2021-10-22 NOTE — NURSING NOTE
"Chief Complaint   Patient presents with     Consult     Consultation S/P Bariatric Surgery Lap Band here to disscuss Revision Surgery       Vitals:    10/22/21 0817   BP: (!) 143/90   BP Location: Left arm   Patient Position: Chair   Cuff Size: Adult Large   Pulse: 88   SpO2: 98%   Weight: 133.9 kg (295 lb 1.6 oz)   Height: 1.715 m (5' 7.5\")       Body mass index is 45.54 kg/m .                       "

## 2021-10-25 ENCOUNTER — MYC MEDICAL ADVICE (OUTPATIENT)
Dept: ENDOCRINOLOGY | Facility: CLINIC | Age: 31
End: 2021-10-25

## 2021-10-25 DIAGNOSIS — E88.810 METABOLIC SYNDROME: ICD-10-CM

## 2021-10-25 DIAGNOSIS — R73.03 PREDIABETES: ICD-10-CM

## 2021-10-25 DIAGNOSIS — E66.01 MORBID OBESITY (H): Primary | ICD-10-CM

## 2021-10-25 DIAGNOSIS — E88.819 INSULIN RESISTANCE: ICD-10-CM

## 2021-10-26 ENCOUNTER — TELEPHONE (OUTPATIENT)
Dept: ENDOCRINOLOGY | Facility: CLINIC | Age: 31
End: 2021-10-26

## 2021-10-26 NOTE — TELEPHONE ENCOUNTER
Patient called [facility] to discuss exclusion. He called me to call [facility] to get a prior authorization form. I spoke with Dorothy in Utilization Management. She basically said to submit the authorization, have patient go through our program, and they will review and send through a denial of service. Tried to explain this to patient but...

## 2021-10-27 DIAGNOSIS — E55.9 VITAMIN D DEFICIENCY: Primary | ICD-10-CM

## 2021-11-02 ENCOUNTER — TELEPHONE (OUTPATIENT)
Dept: ENDOCRINOLOGY | Facility: CLINIC | Age: 31
End: 2021-11-02

## 2021-11-02 NOTE — TELEPHONE ENCOUNTER
"Prior Authorization Retail Medication Request    Medication/Dose: Ozempic  ICD code (if different than what is on RX):    Previously Tried and Failed: Lap band, phentermine/topiramate failed due to lack of significant weight loss   Rationale: Heart Disease, High Blood Pressure  Patient qualifies for use of weight loss medication(s) because they have a BMI of at least 30 kg/m^2.     Estimated body mass index is 45.54 kg/m  as calculated from the following:    Height as of 10/22/21: 1.715 m (5' 7.5\").    Weight as of 10/22/21: 133.9 kg (295 lb 1.6 oz).    Insurance Name:    Insurance ID:        Pharmacy Information (if different than what is on RX)  Name: [id number] - [place], [place] - [address] AT [facility] OF [address]  Phone: [phone]    "

## 2021-11-03 ENCOUNTER — MYC MEDICAL ADVICE (OUTPATIENT)
Dept: PHARMACY | Facility: CLINIC | Age: 31
End: 2021-11-03
Payer: COMMERCIAL

## 2021-11-03 NOTE — TELEPHONE ENCOUNTER
Central Prior Authorization Team   Phone: [phone]      PA Initiation    Medication: Ozempic-PA initiated  Insurance Company: [employer] - Phone [phone] Fax [phone]  Pharmacy Filling the Rx: [id number] [address] AT [facility] OF [address]  Filling Pharmacy Phone: [phone]  Filling Pharmacy Fax:    Start Date: 11/3/2021

## 2021-11-03 NOTE — TELEPHONE ENCOUNTER
Informed patient via mychart that Ozempic is denied and appeal not appropriate given denial.     Lauren Bloch, PharmD  Medication Therapy Management Pharmacist   [facility] [place]

## 2021-11-03 NOTE — TELEPHONE ENCOUNTER
PRIOR AUTHORIZATION DENIED    Medication: Ozempic-PA denied    Denial Date: 11/3/2021    Denial Rational: Dx codes are not covered        Appeal Information:

## 2021-11-14 ENCOUNTER — MYC MEDICAL ADVICE (OUTPATIENT)
Dept: ENDOCRINOLOGY | Facility: CLINIC | Age: 31
End: 2021-11-14
Payer: COMMERCIAL

## 2021-11-14 DIAGNOSIS — E66.01 MORBID OBESITY (H): Primary | ICD-10-CM

## 2021-11-15 ENCOUNTER — MYC MEDICAL ADVICE (OUTPATIENT)
Dept: ENDOCRINOLOGY | Facility: CLINIC | Age: 31
End: 2021-11-15
Payer: COMMERCIAL

## 2021-11-15 DIAGNOSIS — E66.01 MORBID OBESITY (H): ICD-10-CM

## 2021-11-15 RX ORDER — PHENTERMINE HYDROCHLORIDE 15 MG/1
15 CAPSULE ORAL EVERY MORNING
Qty: 30 CAPSULE | Refills: 1 | Status: SHIPPED | OUTPATIENT
Start: 2021-11-15 | End: 2021-11-15

## 2021-11-15 RX ORDER — PHENTERMINE HYDROCHLORIDE 15 MG/1
15 CAPSULE ORAL EVERY MORNING
Qty: 30 CAPSULE | Refills: 1 | Status: SHIPPED | OUTPATIENT
Start: 2021-11-15 | End: 2021-11-17

## 2021-11-15 RX ORDER — TOPIRAMATE 25 MG/1
TABLET, FILM COATED ORAL
Qty: 90 TABLET | Refills: 1 | Status: SHIPPED | OUTPATIENT
Start: 2021-11-15 | End: 2021-11-17

## 2021-11-17 ENCOUNTER — OFFICE VISIT (OUTPATIENT)
Dept: SURGERY | Facility: CLINIC | Age: 31
End: 2021-11-17
Payer: COMMERCIAL

## 2021-11-17 VITALS
BODY MASS INDEX: 44.6 KG/M2 | WEIGHT: 294.3 LBS | DIASTOLIC BLOOD PRESSURE: 79 MMHG | SYSTOLIC BLOOD PRESSURE: 129 MMHG | HEIGHT: 68 IN | HEART RATE: 91 BPM | OXYGEN SATURATION: 96 %

## 2021-11-17 DIAGNOSIS — E66.01 MORBID OBESITY (H): ICD-10-CM

## 2021-11-17 DIAGNOSIS — K44.9 HIATAL HERNIA: Primary | ICD-10-CM

## 2021-11-17 PROCEDURE — 99213 OFFICE O/P EST LOW 20 MIN: CPT | Mod: GC | Performed by: SURGERY

## 2021-11-17 RX ORDER — SEMAGLUTIDE 0.25 MG/.5ML
0.25 INJECTION, SOLUTION SUBCUTANEOUS
Qty: 3 ML | Refills: 0 | Status: SHIPPED | OUTPATIENT
Start: 2021-11-17 | End: 2022-05-10

## 2021-11-17 ASSESSMENT — MIFFLIN-ST. JEOR: SCORE: 2261.5

## 2021-11-17 ASSESSMENT — PAIN SCALES - GENERAL: PAINLEVEL: NO PAIN (0)

## 2021-11-17 NOTE — NURSING NOTE
"Chief Complaint   Patient presents with     RECHECK     Follow-up Bariatric here to disscuss surgery        Vitals:    11/17/21 0901   BP: 129/79   BP Location: Left arm   Patient Position: Chair   Cuff Size: Adult Large   Pulse: 91   SpO2: 96%   Weight: 133.5 kg (294 lb 4.8 oz)   Height: 1.715 m (5' 7.5\")       Body mass index is 45.41 kg/m .                            "

## 2021-11-17 NOTE — PROGRESS NOTES
Return Bariatric Surgery Note    RE: Alcides Beauchamp  MR#: [id number]  : 1990  VISIT DATE: 2021    Dear Dr. Farrell,    I had the pleasure of seeing your patient, Alcides Beauchamp, in my post-bariatric surgery assessment clinic.    CHIEF COMPLAINT: Post-bariatric surgery follow-up    HISTORY OF PRESENT ILLNESS:  Alcides Beauchamp is a 31 year old man with a h/o morbid obesity with prior laparoscopic band placement (, in [place]) and subsequent removal (, in [place]) who presents for follow-up for discussion of bariatric surgery.  He was last seen by Dr. Sanchez on 9/15/2021, at which point he was referred for EGD which occurred on 10/7/2021 with Dr. Durham showing a large type III hiatal hernia 4 cm.  His initial consultation weight was 295 pounds with a BMI of 45.5, and he was given a weight loss goal of 10 pounds.  Today he has a weight of 294 pounds.  He met with Buffy on 10/22/2021 and was prescribed Ozempic, Topamax, and phentermine.  Unfortunately, he has had significant issues with insurance coverage of medications and has been unable to start those, but plans to do so today by paying out of pocket.  He has not yet met with our dietitian or had his psychiatric evaluation, but is making those appointments over the next week or two.     Otherwise, he has been doing well but does not feel like he has made much progress on weight loss due to the stress of ongoing insurance issues.  However, he is motivated to lose weight and if he has surgery would like to do so before his twins are born in 2022.  He does deny any history of reflux related to his hiatal hernia, and had not been told he had 1 before.  He does have mild chronic upper abdominal pain on occasion; he thinks that this has been present since his lap band was placed and removed and has not been significantly different over the past few years.  He denies regurgitation or dysphagia.    Unfortunately, he has not been able to obtain his  operative reports from [facility].    Weight History:  No flowsheet data found.  Initial Weight (lbs): 320 lbs  Weight: 133.5 kg (294 lb 4.8 oz)  Last Visits Weight: 133.9 kg (295 lb 1.6 oz)  Cumulative weight loss (lbs): 25.7  Weight Loss Percentage: 8.03%  Waist Circumference (cm): 133 cm    Questions Regarding Co-Morbidities and Health Concerns Reviewed With Patient 9/15/2021   Are you taking daily medication for heartburn, acid reflux, or GERD (acid reflux disease)? No   Do you use a CPAP? No       Eating Habits 10/21/2021   How many meals do you eat per day? 3   Do you snack between meals? Sometimes       Exercise Questions Reviewed With Patient 10/21/2021   How often do you exercise? Never   What is the duration of your exercise (in minutes)? -   What types of exercise do you do? -   What keeps you from being more active?  My ability to walk or move around is limited, Lack of Time       Social History:  No flowsheet data found.   Is a  at a store in [facility].  Lives at home with his wife.    Medications:  Current Outpatient Medications   Medication     DULoxetine (CYMBALTA) 30 MG capsule     phentermine (ADIPEX-P) 15 MG capsule     topiramate (TOPAMAX) 25 MG tablet     No current facility-administered medications for this visit.     No flowsheet data found.    ROS:  GI: No flowsheet data found.  Skin: No flowsheet data found.  Psych: No flowsheet data found.  Female Only:   BAR RBS ROS - FEMALE ONLY 9/15/2021   Female only: None of the above       LABS/IMAGING/MEDICAL RECORDS REVIEW:   [address] [place] [address] [phone]     Endoscopy Department   _______________________________________________________________________________   Patient Name: Alcides Mfarrej             Procedure Date: 10/7/2021 3:07 PM   MRN: [id number]                       Account Number: [id number]   YOB: 1990              Admit Type: Outpatient    Age: 31                               Room:  #2                       Gender: Male                          Note Status: Finalized   Attending MD: Liss Durham MD  Total Sedation Time: 12 minutes   _______________________________________________________________________________       Procedure:           Upper GI endoscopy   Indications:         Postoperative assessment   Providers:           Liss Durham MD, Shira Daniels RN, Marcela Colón,                        Ap Mclean MD   Patient Profile:     This is a 31 year old male with Hx of gastric lap band                        who is coming for EGD after lap band removal.   Referring MD:        Kameron Sanchez MD   Medicines:           Midazolam 5 mg IV, Fentanyl 75 micrograms IV, Benzocaine                        spray   Complications:       No immediate complications.   _______________________________________________________________________________   Procedure:           Pre-Anesthesia Assessment:                        - Prior to the procedure, a History and Physical was                        performed, and patient medications and allergies were                        reviewed. The patient is competent. The risks and                        benefits of the procedure and the sedation options and                        risks were discussed with the patient. All questions                        were answered and informed consent was obtained. Patient                        identification and proposed procedure were verified by                        the physician in the procedure room. Mental Status                        Examination: alert and oriented. Airway Examination:                        normal oropharyngeal airway and neck mobility.                        Respiratory Examination: clear to auscultation. CV                        Examination: normal. Prophylactic Antibiotics: The                        patient does not require  prophylactic antibiotics. Prior                        Anticoagulants: The patient has taken no previous                        anticoagulant or antiplatelet agents. ASA Grade                        Assessment: II - A patient with mild systemic disease.                        After reviewing the risks and benefits, the patient was                        deemed in satisfactory condition to undergo the                        procedure. The anesthesia plan was to use moderate                        sedation / analgesia (conscious sedation). Immediately                        prior to administration of medications, the patient was                        re-assessed for adequacy to receive sedatives. The heart                        rate, respiratory rate, oxygen saturations, blood                        pressure, adequacy of pulmonary ventilation, and                        response to care were monitored throughout the                        procedure. The physical status of the patient was                        re-assessed after the procedure.                        After obtaining informed consent, the endoscope was                        passed under direct vision. Throughout the procedure,                        the patient's blood pressure, pulse, and oxygen                        saturations were monitored continuously. The Endoscope                        was introduced through the mouth, and advanced to the                        second part of duodenum. The upper GI endoscopy was                        accomplished without difficulty. The patient tolerated                        the procedure well.                                                                                     Findings:        The Z-line was regular and was found 39 cm from the incisors.        A large type III paraesophageal hernia was found. The proximal extent of        the gastric folds (end of tubular esophagus) was 43 cm from the  "       incisors. The hiatal narrowing was 39 cm from the incisors. The Z-line        was 39 cm from the incisors.        No gross lesions were noted in the entire examined stomach.        The examined duodenum was normal.                                                                                     Moderate Sedation:        Moderate (conscious) sedation was administered by the endoscopy nurse        and supervised by the endoscopist. The patient's oxygen saturation,        heart rate, blood pressure and response to care were monitored. Total        physician intraservice time was 12 minutes.   Impression:          - Z-line regular, 39 cm from the incisors.                        - Large type III paraesophageal hernia.                        - No gross lesions in the stomach.                        - Normal examined duodenum.                        - No specimens collected.   Recommendation:      - Discharge patient to home.                        - Resume previous diet.                        - Return to referring physician as previously scheduled.     PHYSICAL EXAMINATION:  /79 (BP Location: Left arm, Patient Position: Chair, Cuff Size: Adult Large)   Pulse 91   Ht 1.715 m (5' 7.5\")   Wt 133.5 kg (294 lb 4.8 oz)   SpO2 96%   BMI 45.41 kg/m     General: Well-appearing man sitting in no acute distress  HEENT: Normocephalic atraumatic, moist mucous membranes  Respiratory: Nonlabored breathing on room air  Abdomen: Soft, nondistended, nontender, laparoscopic scars well-healed and upper abdomen    ASSESSMENT AND PLAN:    Alcides Beauchamp is a 31 year old man with a h/o morbid obesity (class III) with a BMI of 45.5 kg/m^2 on initial consultation. He had prior laparoscopic band placement (2009, in [place]) and subsequent removal (2020, in [place]) with weight regain who presents for ongoing discussions of weight loss surgery.  He did have an EGD on 10/7/2021 with Dr. Durham demonstrating a large type III " hiatal hernia; however, we question if this is in fact representative more of scarring of his stomach from prior band instead of a true hiatal hernia.      As far as weight management, the patient still needs to meet with our registered dietitian and undergo psychiatric evaluation, as well as start medications for weight management as previously prescribed by Buffy; insurance issues have kept him from starting the sooner and continuing this process, but he is very motivated to do so. He completed an informational seminar and is a candidate for weight loss surgery. We will first obtain a CT scan to assess for hiatal hernia, and he will work on Medical Weight Management. We may consider him for laparoscopic Homero-en-Y gastric bypass instead of sleeve gastrectomy if he does have significant scarring of his stomach from the prior lap band (as opposed to a hiatal hernia); we will discuss this further with him at future visits.     - CT abdomen/pelvis to evaluate for hiatal hernia  - Continue with Medical Weight Management: prescribed semaglutide today as patient is willing to pay out of pocket; already prescribed Topamax and phentermine previously but upon discussion with Buffy should just start with semaglutide and wait on starting Topamax and phentermine until seen in follow up with her (pt made aware; he is willing to pay out of pocket for semaglutide now). Continue to meet with Buffy.  - Continue to work through checklist for surgery, including meeting with RD and psychiatric evaluation.  - Weight loss goal of 10 lbs prior to surgery (goal weight 285 lbs)  - Follow up with Dr. Sanchez in a few weeks to discuss hiatal hernia vs stomach scarring and possibility of gastric bypass vs sleeve; this can coincide with ongoing Medical Weight Management as patient may be successful without surgical weight management.      Sincerely,    Lu Alejandra MD   PGY4 General Surgery    Seen and discussed with   Daniel.    I spent a total of 20 minutes face to face with Amr during today's office visit. Over 50% of this time was spent counseling the patient and/or coordinating care.  Answers for HPI/ROS submitted by the patient on 11/9/2021  General Symptoms: No  Skin Symptoms: No  HENT Symptoms: No  EYE SYMPTOMS: No  HEART SYMPTOMS: No  LUNG SYMPTOMS: No  INTESTINAL SYMPTOMS: No  URINARY SYMPTOMS: No  REPRODUCTIVE SYMPTOMS: No  SKELETAL SYMPTOMS: No  BLOOD SYMPTOMS: No  NERVOUS SYSTEM SYMPTOMS: No  MENTAL HEALTH SYMPTOMS: No

## 2021-11-17 NOTE — LETTER
2021       RE: Alcides Beauchamp  [address]  [facility] [address]     Dear Colleague,    Thank you for referring your patient, Alcides Beauchamp, to the [facility] GENERAL SURGERY CLINIC [facility]. Please see a copy of my visit note below.    Return Bariatric Surgery Note    RE: Alcides Beauchamp  MR#: [id number]  : 1990  VISIT DATE: 2021    Dear Dr. Farrell,    I had the pleasure of seeing your patient, Alcides Beauchamp, in my post-bariatric surgery assessment clinic.    CHIEF COMPLAINT: Post-bariatric surgery follow-up    HISTORY OF PRESENT ILLNESS:  Alcides Beauchamp is a 31 year old man with a h/o morbid obesity with prior laparoscopic band placement (, in [place]) and subsequent removal (, in [place]) who presents for follow-up for discussion of bariatric surgery.  He was last seen by Dr. Sanchez on 9/15/2021, at which point he was referred for EGD which occurred on 10/7/2021 with Dr. Durham showing a large type III hiatal hernia 4 cm.  His initial consultation weight was 295 pounds with a BMI of 45.5, and he was given a weight loss goal of 10 pounds.  Today he has a weight of 294 pounds.  He met with Buffy on 10/22/2021 and was prescribed Ozempic, Topamax, and phentermine.  Unfortunately, he has had significant issues with insurance coverage of medications and has been unable to start those, but plans to do so today by paying out of pocket.  He has not yet met with our dietitian or had his psychiatric evaluation, but is making those appointments over the next week or two.     Otherwise, he has been doing well but does not feel like he has made much progress on weight loss due to the stress of ongoing insurance issues.  However, he is motivated to lose weight and if he has surgery would like to do so before his twins are born in 2022.  He does deny any history of reflux related to his hiatal hernia, and had not been told he had  1 before.  He does have mild chronic upper abdominal pain on occasion; he thinks that this has been present since his lap band was placed and removed and has not been significantly different over the past few years.  He denies regurgitation or dysphagia.    Unfortunately, he has not been able to obtain his operative reports from [facility].    Weight History:  No flowsheet data found.  Initial Weight (lbs): 320 lbs  Weight: 133.5 kg (294 lb 4.8 oz)  Last Visits Weight: 133.9 kg (295 lb 1.6 oz)  Cumulative weight loss (lbs): 25.7  Weight Loss Percentage: 8.03%  Waist Circumference (cm): 133 cm    Questions Regarding Co-Morbidities and Health Concerns Reviewed With Patient 9/15/2021   Are you taking daily medication for heartburn, acid reflux, or GERD (acid reflux disease)? No   Do you use a CPAP? No       Eating Habits 10/21/2021   How many meals do you eat per day? 3   Do you snack between meals? Sometimes       Exercise Questions Reviewed With Patient 10/21/2021   How often do you exercise? Never   What is the duration of your exercise (in minutes)? -   What types of exercise do you do? -   What keeps you from being more active?  My ability to walk or move around is limited, Lack of Time       Social History:  No flowsheet data found.   Is a  at a store in [place] [facility] [place].  Lives at home with his wife.    Medications:  Current Outpatient Medications   Medication     DULoxetine (CYMBALTA) 30 MG capsule     phentermine (ADIPEX-P) 15 MG capsule     topiramate (TOPAMAX) 25 MG tablet     No current facility-administered medications for this visit.     No flowsheet data found.    ROS:  GI: No flowsheet data found.  Skin: No flowsheet data found.  Psych: No flowsheet data found.  Female Only:   BAR RBS ROS - FEMALE ONLY 9/15/2021   Female only: None of the above       LABS/IMAGING/MEDICAL RECORDS REVIEW:   [address] [address] [phone]      Endoscopy Department   _______________________________________________________________________________   Patient Name: Alcides Mfarrej             Procedure Date: 10/7/2021 3:07 PM   MRN: [id number]                       Account Number: [id number]   YOB: 1990              Admit Type: Outpatient   Age: 31                               Room: [facility]                       Gender: Male                          Note Status: Finalized   Attending MD: Liss Durham MD  Total Sedation Time: 12 minutes   _______________________________________________________________________________       Procedure:           Upper GI endoscopy   Indications:         Postoperative assessment   Providers:           Liss Durham MD, Shira Daniels, DEMETRIS, Marcela Colón,                        Ap Mclean MD   Patient Profile:     This is a 31 year old male with Hx of gastric lap band                        who is coming for EGD after lap band removal.   Referring MD:        Kameron Sanchez MD   Medicines:           Midazolam 5 mg IV, Fentanyl 75 micrograms IV, Benzocaine                        spray   Complications:       No immediate complications.   _______________________________________________________________________________   Procedure:           Pre-Anesthesia Assessment:                        - Prior to the procedure, a History and Physical was                        performed, and patient medications and allergies were                        reviewed. The patient is competent. The risks and                        benefits of the procedure and the sedation options and                        risks were discussed with the patient. All questions                        were answered and informed consent was obtained. Patient                        identification and proposed procedure were verified by                        the physician in the procedure room. Mental Status                        Examination: alert  and oriented. Airway Examination:                        normal oropharyngeal airway and neck mobility.                        Respiratory Examination: clear to auscultation. CV                        Examination: normal. Prophylactic Antibiotics: The                        patient does not require prophylactic antibiotics. Prior                        Anticoagulants: The patient has taken no previous                        anticoagulant or antiplatelet agents. ASA Grade                        Assessment: II - A patient with mild systemic disease.                        After reviewing the risks and benefits, the patient was                        deemed in satisfactory condition to undergo the                        procedure. The anesthesia plan was to use moderate                        sedation / analgesia (conscious sedation). Immediately                        prior to administration of medications, the patient was                        re-assessed for adequacy to receive sedatives. The heart                        rate, respiratory rate, oxygen saturations, blood                        pressure, adequacy of pulmonary ventilation, and                        response to care were monitored throughout the                        procedure. The physical status of the patient was                        re-assessed after the procedure.                        After obtaining informed consent, the endoscope was                        passed under direct vision. Throughout the procedure,                        the patient's blood pressure, pulse, and oxygen                        saturations were monitored continuously. The Endoscope                        was introduced through the mouth, and advanced to the                        second part of duodenum. The upper GI endoscopy was                        accomplished without difficulty. The patient tolerated                        the procedure well.                  "                                                                    Findings:        The Z-line was regular and was found 39 cm from the incisors.        A large type III paraesophageal hernia was found. The proximal extent of        the gastric folds (end of tubular esophagus) was 43 cm from the        incisors. The hiatal narrowing was 39 cm from the incisors. The Z-line        was 39 cm from the incisors.        No gross lesions were noted in the entire examined stomach.        The examined duodenum was normal.                                                                                     Moderate Sedation:        Moderate (conscious) sedation was administered by the endoscopy nurse        and supervised by the endoscopist. The patient's oxygen saturation,        heart rate, blood pressure and response to care were monitored. Total        physician intraservice time was 12 minutes.   Impression:          - Z-line regular, 39 cm from the incisors.                        - Large type III paraesophageal hernia.                        - No gross lesions in the stomach.                        - Normal examined duodenum.                        - No specimens collected.   Recommendation:      - Discharge patient to home.                        - Resume previous diet.                        - Return to referring physician as previously scheduled.     PHYSICAL EXAMINATION:  /79 (BP Location: Left arm, Patient Position: Chair, Cuff Size: Adult Large)   Pulse 91   Ht 1.715 m (5' 7.5\")   Wt 133.5 kg (294 lb 4.8 oz)   SpO2 96%   BMI 45.41 kg/m     General: Well-appearing man sitting in no acute distress  HEENT: Normocephalic atraumatic, moist mucous membranes  Respiratory: Nonlabored breathing on room air  Abdomen: Soft, nondistended, nontender, laparoscopic scars well-healed and upper abdomen    ASSESSMENT AND PLAN:    Alcides Beauchamp is a 31 year old man with a h/o morbid obesity (class III) with a BMI of 45.5 " kg/m^2 on initial consultation. He had prior laparoscopic band placement (2009, in [place]) and subsequent removal (2020, in [place]) with weight regain who presents for ongoing discussions of weight loss surgery.  He did have an EGD on 10/7/2021 with Dr. Durham demonstrating a large type III hiatal hernia; however, we question if this is in fact representative more of scarring of his stomach from prior band instead of a true hiatal hernia.      As far as weight management, the patient still needs to meet with our registered dietitian and undergo psychiatric evaluation, as well as start medications for weight management as previously prescribed by Buffy; insurance issues have kept him from starting the sooner and continuing this process, but he is very motivated to do so. He completed an informational seminar and is a candidate for weight loss surgery. We will first obtain a CT scan to assess for hiatal hernia, and he will work on Medical Weight Management. We may consider him for laparoscopic Homero-en-Y gastric bypass instead of sleeve gastrectomy if he does have significant scarring of his stomach from the prior lap band (as opposed to a hiatal hernia); we will discuss this further with him at future visits.     - CT abdomen/pelvis to evaluate for hiatal hernia  - Continue with Medical Weight Management: prescribed semaglutide today as patient is willing to pay out of pocket; already prescribed Topamax and phentermine previously but upon discussion with Buffy should just start with semaglutide and wait on starting Topamax and phentermine until seen in follow up with her (pt made aware; he is willing to pay out of pocket for semaglutide now). Continue to meet with Buffy.  - Continue to work through checklist for surgery, including meeting with RD and psychiatric evaluation.  - Weight loss goal of 10 lbs prior to surgery (goal weight 285 lbs)  - Follow up with Dr. Sanchez in a few weeks to discuss hiatal hernia  vs stomach scarring and possibility of gastric bypass vs sleeve; this can coincide with ongoing Medical Weight Management as patient may be successful without surgical weight management.      Sincerely,    Lu Alejandra MD   PGY4 General Surgery    Seen and discussed with Dr. Sanchez.    I spent a total of 20 minutes face to face with Amr during today's office visit. Over 50% of this time was spent counseling the patient and/or coordinating care.  Answers for HPI/ROS submitted by the patient on 11/9/2021  General Symptoms: No  Skin Symptoms: No  HENT Symptoms: No  EYE SYMPTOMS: No  HEART SYMPTOMS: No  LUNG SYMPTOMS: No  INTESTINAL SYMPTOMS: No  URINARY SYMPTOMS: No  REPRODUCTIVE SYMPTOMS: No  SKELETAL SYMPTOMS: No  BLOOD SYMPTOMS: No  NERVOUS SYSTEM SYMPTOMS: No  MENTAL HEALTH SYMPTOMS: No        Attestation signed by Kameron Sanchez MD at 11/18/2021  6:33 PM:  I saw and evaluated the patient. I agree with the findings and the plan of care as documented in the resident s note.    Kameron Sanchez MD        Again, thank you for allowing me to participate in the care of your patient.      Sincerely,    Kameron Sanchez MD

## 2021-11-23 ENCOUNTER — ANCILLARY PROCEDURE (OUTPATIENT)
Dept: CT IMAGING | Facility: CLINIC | Age: 31
End: 2021-11-23
Attending: SURGERY
Payer: COMMERCIAL

## 2021-11-23 DIAGNOSIS — K44.9 HIATAL HERNIA: ICD-10-CM

## 2021-11-23 PROCEDURE — 74177 CT ABD & PELVIS W/CONTRAST: CPT | Performed by: RADIOLOGY

## 2021-11-23 RX ORDER — IOPAMIDOL 755 MG/ML
135 INJECTION, SOLUTION INTRAVASCULAR ONCE
Status: COMPLETED | OUTPATIENT
Start: 2021-11-23 | End: 2021-11-23

## 2021-11-23 RX ADMIN — IOPAMIDOL 135 ML: 755 INJECTION, SOLUTION INTRAVASCULAR at 15:48

## 2021-12-12 ENCOUNTER — HEALTH MAINTENANCE LETTER (OUTPATIENT)
Age: 31
End: 2021-12-12

## 2022-02-07 ENCOUNTER — TELEPHONE (OUTPATIENT)
Dept: ENDOCRINOLOGY | Facility: CLINIC | Age: 32
End: 2022-02-07

## 2022-02-07 ENCOUNTER — OFFICE VISIT (OUTPATIENT)
Dept: ENDOCRINOLOGY | Facility: CLINIC | Age: 32
End: 2022-02-07
Payer: COMMERCIAL

## 2022-02-07 VITALS
SYSTOLIC BLOOD PRESSURE: 134 MMHG | BODY MASS INDEX: 48.12 KG/M2 | OXYGEN SATURATION: 97 % | WEIGHT: 306.6 LBS | HEART RATE: 91 BPM | HEIGHT: 67 IN | DIASTOLIC BLOOD PRESSURE: 86 MMHG

## 2022-02-07 DIAGNOSIS — E66.813 CLASS 3 SEVERE OBESITY DUE TO EXCESS CALORIES WITH SERIOUS COMORBIDITY AND BODY MASS INDEX (BMI) OF 45.0 TO 49.9 IN ADULT (H): Primary | ICD-10-CM

## 2022-02-07 DIAGNOSIS — Z98.84 HISTORY OF REMOVAL OF LAPAROSCOPIC GASTRIC BANDING DEVICE: ICD-10-CM

## 2022-02-07 DIAGNOSIS — E66.01 CLASS 3 SEVERE OBESITY DUE TO EXCESS CALORIES WITH SERIOUS COMORBIDITY AND BODY MASS INDEX (BMI) OF 45.0 TO 49.9 IN ADULT (H): Primary | ICD-10-CM

## 2022-02-07 PROCEDURE — 99213 OFFICE O/P EST LOW 20 MIN: CPT | Performed by: PHYSICIAN ASSISTANT

## 2022-02-07 ASSESSMENT — PAIN SCALES - GENERAL: PAINLEVEL: NO PAIN (0)

## 2022-02-07 ASSESSMENT — MIFFLIN-ST. JEOR: SCORE: 2309.36

## 2022-02-07 NOTE — PATIENT INSTRUCTIONS
Will discuss with Dr Sanchez regarding recent CT scan and plan for possible sleeve vs RYGB    See dietitian 3 times (Feb, March and April) by video    Start semaglutide 0.25mg weekly x 4 weeks and then increase to 0.5mg weekly    Follow up Buffy in 2 months video to follow up semaglutide start    Lose weight to goal weight 285 lbs.

## 2022-02-07 NOTE — LETTER
2022       RE: Alcides Beauchamp  [address]  [facility] [address]     Dear Colleague,    Thank you for referring your patient, Alcides Beauchamp, to the [facility] WEIGHT MANAGEMENT CLINIC [place] [facility]. Please see a copy of my visit note below.    Return Bariatric Surgery Note    RE: Alcides Beauchamp  MR#: [id number]  : 1990  VISIT DATE: 2022      Dear Sanjay Farrell,    I had the pleasure of seeing your patient, Alcides Beauchamp, in my post-bariatric surgery assessment clinic.    Assessment & Plan   Problem List Items Addressed This Visit        Endocrine Diagnoses    Class 3 severe obesity due to excess calories with serious comorbidity and body mass index (BMI) of 45.0 to 49.9 in adult (H) - Primary       Other    History of removal of laparoscopic gastric banding device         Will discuss with Dr Sanchez regarding recent CT scan and plan for possible sleeve vs RYGB. Large HH seen on EGD but not CT scan.  Possible scar tissue from previous band that was removed?    See dietitian 3 times (Feb, March and April) by video    Start semaglutide 0.25mg weekly x 4 weeks and then increase to 0.5mg weekly    Follow up Buffy in 2 months video to follow up semaglutide start    Lose weight to goal weight 285 lbs.      20 minutes spent on the date of the encounter doing chart review, history and exam, documentation and further activities per the note    CHIEF COMPLAINT: Post-bariatric surgery follow-up    HISTORY OF PRESENT ILLNESS:  Questions Regarding Prior Weight Loss Surgery Reviewed With Patient 2022   I had the following weight loss procedure: Sleeve Gastrectomy   What year was your surgery? 2009   How has your weight changed since your last visit? I have stayed about the same   Are you currently taking any weight loss medications? Yes   Do you currently have any of the following: None of the above   Have you been to the Emergency room  since your last visit with us? No   Were you in the hospital since your last visit with us? No   Do you have any concerns today? none   HH seen on EGD but not recent CT scan.    Weight History:     2/7/2022   What is your highest lifetime weight? 320   What is your lowest weight since surgery? (In pounds) 180     Initial Weight (lbs): 320 lbs  Weight: 139.1 kg (306 lb 9.6 oz)  Last Visits Weight: 133.5 kg (294 lb 4.8 oz)  Cumulative weight loss (lbs): 13.4  Weight Loss Percentage: 4.19%  Waist Circumference (cm): 139 cm    Questions Regarding Co-Morbidities and Health Concerns Reviewed With Patient 2/7/2022   Pre-diabetes: Stayed the same   Diabetes II: Never   High Blood Pressure: Never   High cholesterol: Never   Heartburn/Reflux: Never   Are you taking daily medication for heartburn, acid reflux, or GERD (acid reflux disease)? -   Sleep apnea: Improved   Do you use a CPAP? -   PCOS: Never   Back pain: Improved   Joint pain: Improved   Lower leg swelling: Stayed the same       Eating Habits 2/7/2022   How many meals do you eat per day? 3   Do you snack between meals? Yes   How much food are you eating at each meal? Greater than 1 cup   Are you able to separate your meals and liquids by at least 30 minutes? No   Are you able to avoid liquid calories? Yes       Exercise Questions Reviewed With Patient 2/7/2022   How often do you exercise? Never   What is the duration of your exercise (in minutes)? -   What types of exercise do you do? -   What keeps you from being more active?  Lack of Time       Social History:      2/7/2022   Are you smoking? No   Are you drinking alcohol? Yes   How much alcohol? 2       Medications:  Current Outpatient Medications   Medication     DULoxetine (CYMBALTA) 30 MG capsule     Semaglutide-Weight Management (WEGOVY) 0.25 MG/0.5ML SOAJ     No current facility-administered medications for this visit.         2/7/2022   Do you avoid NSAIDs such as (Ibuprofen, Aleve, Naproxen, Advil)?   Yes        ROS:  GI:      2/7/2022   Vomiting: No   Diarrhea: No   Constipation: No   Swallowing trouble: No   Abdominal pain: No   Heartburn: No   Rash in skin folds: No   Depression: No   Stress urinary incontinence No     Skin:   BAR RBS ROS - SKIN 2/7/2022   Rash in skin folds: No     Psych:      2/7/2022   Depression: No   Anxiety: No     Female Only:   ADRIEL RBS ROS - FEMALE ONLY 9/15/2021   Female only: None of the above       CT scan 11/23/21                                                                   Impression:   1. Postoperative changes at the gastroesophageal junction. No evidence  of hiatal hernia.  2. Small fat-containing ventral hernia.     EGD 10/7/21                                                      Findings:        The Z-line was regular and was found 39 cm from the incisors.        A large type III paraesophageal hernia was found. The proximal extent of        the gastric folds (end of tubular esophagus) was 43 cm from the        incisors. The hiatal narrowing was 39 cm from the incisors. The Z-line        was 39 cm from the incisors.        No gross lesions were noted in the entire examined stomach.        The examined duodenum was normal.                                                                                     Moderate Sedation:        Moderate (conscious) sedation was administered by the endoscopy nurse        and supervised by the endoscopist. The patient's oxygen saturation,        heart rate, blood pressure and response to care were monitored. Total        physician intraservice time was 12 minutes.   Impression:          - Z-line regular, 39 cm from the incisors.                        - Large type III paraesophageal hernia.                        - No gross lesions in the stomach.                        - Normal examined duodenum.                        - No specimens collected.   Recommendation:      - Discharge patient to home.                        - Resume previous diet.  "                       - Return to referring physician as previously scheduled.                                                                                       electronically signed by Liss Durham   ______________________   Liss Durham MD   10/7/2021 4:01:34 PM     PHYSICAL EXAM:  Objective    /86 (BP Location: Left arm, Patient Position: Chair, Cuff Size: Adult Large)   Pulse 91   Ht 1.702 m (5' 7\")   Wt 139.1 kg (306 lb 9.6 oz)   SpO2 97%   BMI 48.02 kg/m    Body mass index is 48.02 kg/m .  Physical Exam   GENERAL: Healthy, alert and no distress  EYES: Eyes grossly normal to inspection.  No discharge or erythema, or obvious scleral/conjunctival abnormalities.  RESP: No audible wheeze, cough, or visible cyanosis.  No visible retractions or increased work of breathing.    SKIN: Visible skin clear. No significant rash, abnormal pigmentation or lesions.  NEURO: Cranial nerves grossly intact.  Mentation and speech appropriate for age.  PSYCH: Mentation appears normal, affect normal/bright, judgement and insight intact, normal speech and appearance well-groomed.      Sincerely,    Buffy Forbes PA-C      "

## 2022-02-07 NOTE — NURSING NOTE
"Chief Complaint   Patient presents with     RECHECK     3 Months Follow-up Laparscopic Gastric Sleeve Surgery       Vitals:    02/07/22 1341   BP: 134/86   BP Location: Left arm   Patient Position: Chair   Cuff Size: Adult Large   Pulse: 91   SpO2: 97%   Weight: 139.1 kg (306 lb 9.6 oz)   Height: 1.702 m (5' 7\")       Body mass index is 48.02 kg/m .                          "

## 2022-02-07 NOTE — TELEPHONE ENCOUNTER
Called [facility] [place] to check if policy has coverage for bariatric surgery, it does. Policy IV-19.

## 2022-02-14 PROBLEM — E66.01 CLASS 3 SEVERE OBESITY DUE TO EXCESS CALORIES WITH BODY MASS INDEX (BMI) OF 45.0 TO 49.9 IN ADULT, UNSPECIFIED WHETHER SERIOUS COMORBIDITY PRESENT (H): Status: ACTIVE | Noted: 2021-09-15

## 2022-02-14 PROBLEM — E66.813 CLASS 3 SEVERE OBESITY DUE TO EXCESS CALORIES WITH BODY MASS INDEX (BMI) OF 45.0 TO 49.9 IN ADULT, UNSPECIFIED WHETHER SERIOUS COMORBIDITY PRESENT (H): Status: ACTIVE | Noted: 2021-09-15

## 2022-02-14 PROBLEM — Z98.84 HISTORY OF REMOVAL OF LAPAROSCOPIC GASTRIC BANDING DEVICE: Status: ACTIVE | Noted: 2022-02-14

## 2022-02-14 NOTE — PROGRESS NOTES
Return Bariatric Surgery Note    RE: Alcides Beauchamp  MR#: [id number]  : 1990  VISIT DATE: 2022      Dear Sanjay Farrell,    I had the pleasure of seeing your patient, Alcides Beauchamp, in my post-bariatric surgery assessment clinic.    Assessment & Plan   Problem List Items Addressed This Visit        Endocrine Diagnoses    Class 3 severe obesity due to excess calories with serious comorbidity and body mass index (BMI) of 45.0 to 49.9 in adult (H) - Primary       Other    History of removal of laparoscopic gastric banding device         Will discuss with Dr Sanchez regarding recent CT scan and plan for possible sleeve vs RYGB. Large HH seen on EGD but not CT scan.  Possible scar tissue from previous band that was removed?    See dietitian 3 times (Feb, March and April) by video    Start semaglutide 0.25mg weekly x 4 weeks and then increase to 0.5mg weekly    Follow up Buffy in 2 months video to follow up semaglutide start    Lose weight to goal weight 285 lbs.      20 minutes spent on the date of the encounter doing chart review, history and exam, documentation and further activities per the note    CHIEF COMPLAINT: Post-bariatric surgery follow-up    HISTORY OF PRESENT ILLNESS:  Questions Regarding Prior Weight Loss Surgery Reviewed With Patient 2022   I had the following weight loss procedure: Sleeve Gastrectomy   What year was your surgery?    How has your weight changed since your last visit? I have stayed about the same   Are you currently taking any weight loss medications? Yes   Do you currently have any of the following: None of the above   Have you been to the Emergency room since your last visit with us? No   Were you in the hospital since your last visit with us? No   Do you have any concerns today? none   HH seen on EGD but not recent CT scan.    Weight History:     2022   What is your highest lifetime weight? 320   What is your lowest weight since surgery? (In pounds) 180      Initial Weight (lbs): 320 lbs  Weight: 139.1 kg (306 lb 9.6 oz)  Last Visits Weight: 133.5 kg (294 lb 4.8 oz)  Cumulative weight loss (lbs): 13.4  Weight Loss Percentage: 4.19%  Waist Circumference (cm): 139 cm    Questions Regarding Co-Morbidities and Health Concerns Reviewed With Patient 2/7/2022   Pre-diabetes: Stayed the same   Diabetes II: Never   High Blood Pressure: Never   High cholesterol: Never   Heartburn/Reflux: Never   Are you taking daily medication for heartburn, acid reflux, or GERD (acid reflux disease)? -   Sleep apnea: Improved   Do you use a CPAP? -   PCOS: Never   Back pain: Improved   Joint pain: Improved   Lower leg swelling: Stayed the same       Eating Habits 2/7/2022   How many meals do you eat per day? 3   Do you snack between meals? Yes   How much food are you eating at each meal? Greater than 1 cup   Are you able to separate your meals and liquids by at least 30 minutes? No   Are you able to avoid liquid calories? Yes       Exercise Questions Reviewed With Patient 2/7/2022   How often do you exercise? Never   What is the duration of your exercise (in minutes)? -   What types of exercise do you do? -   What keeps you from being more active?  Lack of Time       Social History:      2/7/2022   Are you smoking? No   Are you drinking alcohol? Yes   How much alcohol? 2       Medications:  Current Outpatient Medications   Medication     DULoxetine (CYMBALTA) 30 MG capsule     Semaglutide-Weight Management (WEGOVY) 0.25 MG/0.5ML SOAJ     No current facility-administered medications for this visit.         2/7/2022   Do you avoid NSAIDs such as (Ibuprofen, Aleve, Naproxen, Advil)?   Yes       ROS:  GI:      2/7/2022   Vomiting: No   Diarrhea: No   Constipation: No   Swallowing trouble: No   Abdominal pain: No   Heartburn: No   Rash in skin folds: No   Depression: No   Stress urinary incontinence No     Skin:   BAR RBS ROS - SKIN 2/7/2022   Rash in skin folds: No     Psych:      2/7/2022    Depression: No   Anxiety: No     Female Only:   BAR RBS ROS - FEMALE ONLY 9/15/2021   Female only: None of the above       CT scan 11/23/21                                                                   Impression:   1. Postoperative changes at the gastroesophageal junction. No evidence  of hiatal hernia.  2. Small fat-containing ventral hernia.     EGD 10/7/21                                                      Findings:        The Z-line was regular and was found 39 cm from the incisors.        A large type III paraesophageal hernia was found. The proximal extent of        the gastric folds (end of tubular esophagus) was 43 cm from the        incisors. The hiatal narrowing was 39 cm from the incisors. The Z-line        was 39 cm from the incisors.        No gross lesions were noted in the entire examined stomach.        The examined duodenum was normal.                                                                                     Moderate Sedation:        Moderate (conscious) sedation was administered by the endoscopy nurse        and supervised by the endoscopist. The patient's oxygen saturation,        heart rate, blood pressure and response to care were monitored. Total        physician intraservice time was 12 minutes.   Impression:          - Z-line regular, 39 cm from the incisors.                        - Large type III paraesophageal hernia.                        - No gross lesions in the stomach.                        - Normal examined duodenum.                        - No specimens collected.   Recommendation:      - Discharge patient to home.                        - Resume previous diet.                        - Return to referring physician as previously scheduled.                                                                                       electronically signed by Liss Durham   ______________________   Liss Durham MD   10/7/2021 4:01:34 PM     PHYSICAL  "EXAM:  Objective    /86 (BP Location: Left arm, Patient Position: Chair, Cuff Size: Adult Large)   Pulse 91   Ht 1.702 m (5' 7\")   Wt 139.1 kg (306 lb 9.6 oz)   SpO2 97%   BMI 48.02 kg/m    Body mass index is 48.02 kg/m .  Physical Exam   GENERAL: Healthy, alert and no distress  EYES: Eyes grossly normal to inspection.  No discharge or erythema, or obvious scleral/conjunctival abnormalities.  RESP: No audible wheeze, cough, or visible cyanosis.  No visible retractions or increased work of breathing.    SKIN: Visible skin clear. No significant rash, abnormal pigmentation or lesions.  NEURO: Cranial nerves grossly intact.  Mentation and speech appropriate for age.  PSYCH: Mentation appears normal, affect normal/bright, judgement and insight intact, normal speech and appearance well-groomed.          Sincerely,    Buffy Forbes PA-C    "

## 2022-02-15 NOTE — PROGRESS NOTES
"Alcides Beauchamp is a 31 year old male who is being evaluated via a billable video visit.      The patient has been notified of following:     \"This video visit will be conducted via a call between you and your physician/provider. We have found that certain health care needs can be provided without the need for an in-person physical exam.  This service lets us provide the care you need with a video conversation.  If a prescription is necessary we can send it directly to your pharmacy.  If lab work is needed we can place an order for that and you can then stop by our lab to have the test done at a later time.    Video visits are billed at different rates depending on your insurance coverage.  Please reach out to your insurance provider with any questions.    If during the course of the call the physician/provider feels a video visit is not appropriate, you will not be charged for this service.\"    Patient has given verbal consent for Video visit? Yes  How would you like to obtain your AVS? MyChart  If you are dropped from the video visit, the video invite should be resent to: Send to e-mail at: [email or web address]  Will anyone else be joining your video visit? No  {If patient encounters technical issues they should call [phone]      Video-Visit Details    Type of service:  Video Visit    Video Start Time: 2:00 PM  Video End Time: 2:19 PM    Originating Location (pt. Location): Home    Distant Location (provider location):  [facility] [place]     Platform used for Video Visit: [employer]    During this virtual visit the patient is located in [place], patient verifies this as the location during the entirety of this visit.       New Bariatric Nutrition Consultation Note    Reason For Visit: Nutrition Assessment    Alcides Beauchamp is a 31 year old presenting today for new bariatric nutrition consult.   Pt is interested in laparoscopic sleeve gastrectomy or RNY with Dr. Sanchez expected " "surgery in TBD.  Patient is accompanied by self.  This is pt's first of 3 required nutrition visits prior to surgery.     Pt referred by ANITHA Johnson on February 15, 2022.  Patient with Co-morbidities of obesity including:  Type II DM no  Renal Failure no  Sleep apnea yes  Hypertension no   Dyslipidemia no  Joint pain no  Back pain no  GERD no     Support System Reviewed With Patient 10/21/2021   Who do you have in your support network that can be available to help you for the first 2 weeks after surgery? wife and family   Who can you count on for support throughout your weight loss surgery journey? wife and family       ANTHROPOMETRICS:  Estimated body mass index is 48.02 kg/m  as calculated from the following:    Height as of 2/7/22: 1.702 m (5' 7\").    Weight as of 2/7/22: 139.1 kg (306 lb 9.6 oz).     Current weight (2/16/22): 305 lbs    Required weight loss goal pre-op: -20 lbs from initial consult weight (goal weight 285 lbs or less before surgery)       10/21/2021   I have tried the following methods to lose weight Weight Loss Surgery       Weight Loss Questions Reviewed With Patient 10/21/2021   How long have you been overweight? Since puberty       SUPPLEMENT INFORMATION:  Vitamin D    Saxenda    NUTRITION HISTORY:  Hx of lap band (2009). Has gained ~100 lbs in the last year. Pt has been struggling with consuming large amounts of \"junk\" food. Wife is not cooking d/t being pregnant with twins. Struggles with large portion sizes.     Pt waiting for Dr. Carnes to determine if gastric sleeve or RNY is the best option.    Recall Diet Questions Reviewed With Patient 10/21/2021   Describe what you typically consume for breakfast (typical or most recent): no breakfast   Describe what you typically consume for lunch (typical or most recent): fries burger ,pop   Describe what you typically consume for supper (typical or most recent): mozzrilla stick pop   Describe what you typically consume as snacks " (typical or most recent): anything chips   How many ounces of water, or other low calorie drinks, do you drink daily (8 oz=1 glass)? 64 oz or more   How many ounces of caffeine (coffee, tea, pop) do you drink daily (8 oz=1 glass)? 32 oz   How many ounces of carbonated (pop, beer, sparkling water) drinks do you drinky daily (8 oz=1 glass)? 8 oz   How many ounces of juice, pop, sweet tea, sports drinks, protein drinks, other sweetened drinks, do you drink daily (8 oz=1 glass)? -   How many ounces of milk do you drink daily (8 oz=1 glass) 8 oz   Please indicate the type of milk: 1%   How often do you drink alcohol? Monthly or less   If you do drink alcohol, how many drinks might you have in a day? (one drink = 5 oz. wine, 1 can/bottle of beer, 1 shot liquor) 1 or 2       Eating Habits 10/21/2021   Do you have any dietary restrictions? No   Do you currently binge eat (eat a large amount of food in a short time)? No   Are you an emotional eater? No   Do you get up to eat after falling asleep? No   What foods do you crave? anything chocolate,chips       ADDITIONAL INFORMATION:  Physical activity- moves around a lot at work    Dining Out History Reviewed With Patient 10/21/2021   How often do you dine out? A couple of times a week.   Where do you dine out? (select all that apply) sit-down restaurants, fast food chains   What types of food do you order when you dine out? shira richter       Physical Activity Reviewed With Patient 2/7/2022   How often do you exercise? Never   What is the duration of your exercise (in minutes)? -   What types of exercise do you do? -   What keeps you from being more active?  Lack of Time         NUTRITION DIAGNOSIS:  Obesity r/t long history of positive energy balance aeb BMI >30 kg/m2.    INTERVENTION:  Intervention Provided/Education Provided on post-op diet guidelines, vitamins/minerals essential post-operatively, GI anatomy of bariatric surgeries, ways to help prepare for  "post-op diet guidelines pre-operatively, portion/calorie-control, mindful eating and sources of protein.  Patient demonstrates understanding. Provided pt with list of goals RD contact information.      Questions Reviewed With Patient 10/21/2021   How ready are you to make changes regarding your weight? Number 1 = Not ready at all to make changes up to 10 = very ready. 10   How confident are you that you can change? 1 = Not confident that you will be successful making changes up to 10 = very confident. 10       Expected Engagement: good    GOALS:  Relating To Eatin) Focus on portion sizes    Portion Control Without Measuring  [email or web address]     2) 9\" Plate method (1/2 plate non-starchy vegetables/fruit, 1/4 plate lean protein, 1/4 plate whole grain starch - no more than 1/2 cup carb/meal)  3) Eat 3 meals per day  4) Consume 60-90 g protein per day  5) Track calories using MyFitness pal (1900 calories per day)    Relating to dietary supplements:  1) Start a multivitamin containing iron daily      The Plate Method  [email or web address]    Protein Sources for Weight Loss  [email or web address]     Carbohydrates  [email or web address]     Mindful Eating  [email or web address]     Summary of Volumetrics Eating Plan  [email or web address]     Diet Guidelines after Weight Loss Surgery  [email or web address]     Seated Exercises for Arms and Legs (can be done before or after surgery)  [email or web address]    Follow up: 1 month, prn    Time spent with patient: 19 minutes.  TORI BARROS RD, LD      "

## 2022-02-16 ENCOUNTER — VIRTUAL VISIT (OUTPATIENT)
Dept: ENDOCRINOLOGY | Facility: CLINIC | Age: 32
End: 2022-02-16
Payer: COMMERCIAL

## 2022-02-16 DIAGNOSIS — E66.813 CLASS 3 SEVERE OBESITY DUE TO EXCESS CALORIES WITH SERIOUS COMORBIDITY AND BODY MASS INDEX (BMI) OF 45.0 TO 49.9 IN ADULT (H): ICD-10-CM

## 2022-02-16 DIAGNOSIS — Z71.3 NUTRITIONAL COUNSELING: Primary | ICD-10-CM

## 2022-02-16 DIAGNOSIS — E66.01 CLASS 3 SEVERE OBESITY DUE TO EXCESS CALORIES WITH SERIOUS COMORBIDITY AND BODY MASS INDEX (BMI) OF 45.0 TO 49.9 IN ADULT (H): ICD-10-CM

## 2022-02-16 PROCEDURE — 97802 MEDICAL NUTRITION INDIV IN: CPT | Mod: 95 | Performed by: DIETITIAN, REGISTERED

## 2022-02-16 NOTE — LETTER
"2/16/2022       RE: Alcides Beauchamp  [address]  [facility] [address]     Dear Colleague,    Thank you for referring your patient, Alcides Beauchamp, to the [facility] [place] [facility]. Please see a copy of my visit note below.    Alcides Beauchamp is a 31 year old male who is being evaluated via a billable video visit.      The patient has been notified of following:     \"This video visit will be conducted via a call between you and your physician/provider. We have found that certain health care needs can be provided without the need for an in-person physical exam.  This service lets us provide the care you need with a video conversation.  If a prescription is necessary we can send it directly to your pharmacy.  If lab work is needed we can place an order for that and you can then stop by our lab to have the test done at a later time.    Video visits are billed at different rates depending on your insurance coverage.  Please reach out to your insurance provider with any questions.    If during the course of the call the physician/provider feels a video visit is not appropriate, you will not be charged for this service.\"    Patient has given verbal consent for Video visit? Yes  How would you like to obtain your AVS? MyChart  If you are dropped from the video visit, the video invite should be resent to: Send to e-mail at: [email or web address]  Will anyone else be joining your video visit? No  {If patient encounters technical issues they should call [phone]      Video-Visit Details    Type of service:  Video Visit    Video Start Time: 2:00 PM  Video End Time: 2:19 PM    Originating Location (pt. Location): Home    Distant Location (provider location):  [facility] [place]     Platform used for Video Visit: [employer]    During this virtual visit the patient is located in [place], patient verifies this as the " "location during the entirety of this visit.       New Bariatric Nutrition Consultation Note    Reason For Visit: Nutrition Assessment    Alcides Beauchamp is a 31 year old presenting today for new bariatric nutrition consult.   Pt is interested in laparoscopic sleeve gastrectomy or RNY with Dr. Sanchez expected surgery in TBD.  Patient is accompanied by self.  This is pt's first of 3 required nutrition visits prior to surgery.     Pt referred by NAITHA Johnson on February 15, 2022.  Patient with Co-morbidities of obesity including:  Type II DM no  Renal Failure no  Sleep apnea yes  Hypertension no   Dyslipidemia no  Joint pain no  Back pain no  GERD no     Support System Reviewed With Patient 10/21/2021   Who do you have in your support network that can be available to help you for the first 2 weeks after surgery? wife and family   Who can you count on for support throughout your weight loss surgery journey? wife and family       ANTHROPOMETRICS:  Estimated body mass index is 48.02 kg/m  as calculated from the following:    Height as of 2/7/22: 1.702 m (5' 7\").    Weight as of 2/7/22: 139.1 kg (306 lb 9.6 oz).     Current weight (2/16/22): 305 lbs    Required weight loss goal pre-op: -20 lbs from initial consult weight (goal weight 285 lbs or less before surgery)       10/21/2021   I have tried the following methods to lose weight Weight Loss Surgery       Weight Loss Questions Reviewed With Patient 10/21/2021   How long have you been overweight? Since puberty       SUPPLEMENT INFORMATION:  Vitamin D    Saxenda    NUTRITION HISTORY:  Hx of lap band (2009). Has gained ~100 lbs in the last year. Pt has been struggling with consuming large amounts of \"junk\" food. Wife is not cooking d/t being pregnant with twins. Struggles with large portion sizes.     Pt waiting for Dr. Carnes to determine if gastric sleeve or RNY is the best option.    Recall Diet Questions Reviewed With Patient 10/21/2021   Describe what you " typically consume for breakfast (typical or most recent): no breakfast   Describe what you typically consume for lunch (typical or most recent): fries burger ,pop   Describe what you typically consume for supper (typical or most recent): mozzrilla stick pop   Describe what you typically consume as snacks (typical or most recent): anything chips   How many ounces of water, or other low calorie drinks, do you drink daily (8 oz=1 glass)? 64 oz or more   How many ounces of caffeine (coffee, tea, pop) do you drink daily (8 oz=1 glass)? 32 oz   How many ounces of carbonated (pop, beer, sparkling water) drinks do you drinky daily (8 oz=1 glass)? 8 oz   How many ounces of juice, pop, sweet tea, sports drinks, protein drinks, other sweetened drinks, do you drink daily (8 oz=1 glass)? -   How many ounces of milk do you drink daily (8 oz=1 glass) 8 oz   Please indicate the type of milk: 1%   How often do you drink alcohol? Monthly or less   If you do drink alcohol, how many drinks might you have in a day? (one drink = 5 oz. wine, 1 can/bottle of beer, 1 shot liquor) 1 or 2       Eating Habits 10/21/2021   Do you have any dietary restrictions? No   Do you currently binge eat (eat a large amount of food in a short time)? No   Are you an emotional eater? No   Do you get up to eat after falling asleep? No   What foods do you crave? anything chocolate,chips       ADDITIONAL INFORMATION:  Physical activity- moves around a lot at work    Dining Out History Reviewed With Patient 10/21/2021   How often do you dine out? A couple of times a week.   Where do you dine out? (select all that apply) sit-down restaurants, fast food chains   What types of food do you order when you dine out? shira richter       Physical Activity Reviewed With Patient 2/7/2022   How often do you exercise? Never   What is the duration of your exercise (in minutes)? -   What types of exercise do you do? -   What keeps you from being more active?  Lack  "of Time         NUTRITION DIAGNOSIS:  Obesity r/t long history of positive energy balance aeb BMI >30 kg/m2.    INTERVENTION:  Intervention Provided/Education Provided on post-op diet guidelines, vitamins/minerals essential post-operatively, GI anatomy of bariatric surgeries, ways to help prepare for post-op diet guidelines pre-operatively, portion/calorie-control, mindful eating and sources of protein.  Patient demonstrates understanding. Provided pt with list of goals RD contact information.      Questions Reviewed With Patient 10/21/2021   How ready are you to make changes regarding your weight? Number 1 = Not ready at all to make changes up to 10 = very ready. 10   How confident are you that you can change? 1 = Not confident that you will be successful making changes up to 10 = very confident. 10       Expected Engagement: good    GOALS:  Relating To Eatin) Focus on portion sizes    Portion Control Without Measuring  [email or web address]     2) 9\" Plate method (1/2 plate non-starchy vegetables/fruit, 1/4 plate lean protein, 1/4 plate whole grain starch - no more than 1/2 cup carb/meal)  3) Eat 3 meals per day  4) Consume 60-90 g protein per day  5) Track calories using Tap[employer] pal (1900 calories per day)    Relating to dietary supplements:  1) Start a multivitamin containing iron daily      The Plate Method  [email or web address]    Protein Sources for Weight Loss  [email or web address]     Carbohydrates  [email or web address]     Mindful Eating  [email or web address]     Summary of Volumetrics Eating Plan  [email or web address]     Diet Guidelines after Weight Loss Surgery  [email or web address]     Seated Exercises for Arms and Legs (can be done before or after surgery)  [email or web address]    Follow up: 1 month, prn    Time spent with patient: 19 minutes.  TORI BARROS RD, LD    "

## 2022-02-16 NOTE — PATIENT INSTRUCTIONS
"Hi Amr!    Follow-up with RD in 1 month    Thank you,    Zenia Cardona, RD, LD  If you would like to schedule or reschedule an appointment with the RD, please call [phone]    Nutrition Goals  Relating To Eatin) Focus on portion sizes    Portion Control Without Measuring  [email or web address]     2) 9\" Plate method (1/2 plate non-starchy vegetables/fruit, 1/4 plate lean protein, 1/4 plate whole grain starch - no more than 1/2 cup carb/meal)  3) Eat 3 meals per day  4) Consume 60-90 g protein per day  5) Track calories using MyFitness pal (1900 calories per day)    Relating to dietary supplements:  1) Start a multivitamin containing iron daily      The Plate Method  [email or web address]    Protein Sources for Weight Loss  [email or web address]     Carbohydrates  [email or web address]     Mindful Eating  [email or web address]://[email or web address]     Summary of Volumetrics Eating Plan  [email or web address]     Diet Guidelines after Weight Loss Surgery  [email or web address]     Seated Exercises for Arms and Legs (can be done before or after surgery)  [email or web address]        Interested in working with a health ? Health coaches work with you to improve your overall health and wellbeing. They look at the whole person, and may involve discussion of different areas of life, including, but not limited to the four pillars of health (sleep, exercise, nutrition, and stress management). Discuss with your care team if you would like to start working a health .    Health Coaching-3 Pack:    $99 for three health coaching visits    Visits may be done in person or via phone    Coaching is a partnership between the  and the client; Coaches do not prescribe or diagnose    Coaching helps inspire the client to reach his/her personal goals    COMPREHENSIVE WEIGHT MANAGEMENT PROGRAM  VIRTUAL SUPPORT GROUPS    For Support Group Information:      We offer " "support groups for patients who are working on weight loss and considering, preparing for or have had weight loss surgery.   There is no cost for this opportunity.  You are invited to attend the?Virtual Support Groups?provided by any of the following locations:    1. [facility] via Microsoft Teams with Amy Benavidez RN  2.   [place] via [email or web address].com with Donald Obrien, PhD, LP  3.   [place] via [email or web address].com with Cristin Baltazar RN  4.   [facility] via [employer] Teams with Cristin Belcher [facility]    The following Support Group information can also be found on our website:  [email or web address]      [facility] Weight Loss Surgery Support Group    [facility] Weight Loss Surgery Support Group  The support group is a patient-lead forum that meets monthly to share experiences, encouragement and education. It is open to those who have had weight loss surgery, are scheduled for surgery, and those who are considering surgery.   WHEN: This group meets on the 3rd Wednesday of each month from 5:00PM - 6:00PM virtually using Microsoft Teams.   FACILITATOR: Led by Amy Benavidez RD, LD, RN, the program's Clinical Coordinator.   TO REGISTER: Please contact the clinic via Playtox or call the nurse line directly at [phone] to inform our staff that you would like an invite sent to you and to let us know the email you would like the invite sent to. Prior to the meeting, a link with directions on how to join the meeting will be sent to you.    2022 Meetings  January 19: \"Let's Talk\" a time for the group to share.  February 16: \"Let's Talk\" a time for the group to share.  March 16: Guest Speakers: Psychologists, Lu Lees, PhD,LP and Marisol Quiles PsyD,LP  April 20: Guest Speaker: Health , Asmita Akhtar, [facility],CHES, CPT  May 18: Guest Speaker: Dietitian, Miguel Chambers, HAYES, LP  Mone 15: \"Let's Talk\" a time for the group to " "share.  July 20: \"Let's Talk\" a time for the group to share.  August 17: TBA  September 21: TBA  October 19: Guest Speaker: Dr Dale Remy MD Pulmonologist and Sleep Medicine Physician, \"Getting a Good Night's Sleep\".  November 16: TBA  December 21: TBA    [facility] Support Groups    Connections: Bariatric Care Support Group?  This is open to all [facility] (and those external to this program) pre- and post- operative bariatric surgery patients as well as their support system.   WHEN: This group meets the 2nd Tuesday of each month from 6:30 PM - 8:00 PM virtually using Microsoft Teams.   FACILITATOR: Led by Donald Obrien, Ph.D who is a Licensed Psychologist with the [facility] Comprehensive Weight Management Program.   TO REGISTER: Please send an email to Donald Obrien, Ph.D., LP at?[email or web address]?if you would like an invitation to the group and to learn about using Microsoft Teams.    2022 Meetings  January 11: Tash Mendoza, PharmD, Pharmacy Resident at [facility], \"Medications and Bariatric Surgery\".  February 8: Open Forum  March 8  April 12  May 10  Mone 14    Connections: Post-Operative Bariatric Surgery Support Group  This is a support group for [facility] bariatric patients (and those external to [facility]) who have had bariatric surgery and are at least 3 months post-surgery.  WHEN: This support group meets the 4th Wednesday of the month from 11:00 AM - 12:00 PM virtually using Microsoft Teams.   FACILITATOR: Led by Certified Bariatric Nurse, Cristin Baltazar RN.   TO REGISTER: Please send an email to Cristin at [email or web address] if you would like an invitation to the group and to learn about using Microsoft Teams.    2022 Meetings  January 26  February 23  March 23  April 27  May 25  Mone 22    [facility] Healthy Lifestyle Virtual Support Group    Healthy Lifestyle Virtual " "Support Group?  This is 60 minutes of small group guided discussion, support and resources. All are welcome who want a healthy lifestyle.  WHEN: This group meets monthly on a Friday from 12:30 PM - 1:30 PM virtually using Microsoft Teams.   FACILITATOR: Led by National Board Certified Health and , Cristin Belcher Replaced [facility].   TO REGISTER: Please send an email to Cristin at?[email or web address] to receive monthly invites to the group or if you have any questions about having a health .  Prior to the meeting, a link with directions on how to join the meeting will be sent to you.    2022 Meetings  January 21: Antonella Domingo MS, RN, CIC, CBN, \"Healthy Habits\"  February 25: Open Forum  March 18: \"Setting Limits and Boundaries\"  April 29: Sanjuanita Royal RD, \"Meal Planning Made Easy\"  May 20: Open Forum  June: To be determined                  "

## 2022-03-09 ENCOUNTER — VIRTUAL VISIT (OUTPATIENT)
Dept: ENDOCRINOLOGY | Facility: CLINIC | Age: 32
End: 2022-03-09
Payer: COMMERCIAL

## 2022-03-09 DIAGNOSIS — E66.813 CLASS 3 SEVERE OBESITY DUE TO EXCESS CALORIES WITH SERIOUS COMORBIDITY AND BODY MASS INDEX (BMI) OF 45.0 TO 49.9 IN ADULT (H): ICD-10-CM

## 2022-03-09 DIAGNOSIS — E66.01 CLASS 3 SEVERE OBESITY DUE TO EXCESS CALORIES WITH SERIOUS COMORBIDITY AND BODY MASS INDEX (BMI) OF 45.0 TO 49.9 IN ADULT (H): ICD-10-CM

## 2022-03-09 DIAGNOSIS — Z71.3 NUTRITIONAL COUNSELING: Primary | ICD-10-CM

## 2022-03-09 PROCEDURE — 97803 MED NUTRITION INDIV SUBSEQ: CPT | Mod: 95 | Performed by: DIETITIAN, REGISTERED

## 2022-03-09 NOTE — LETTER
"3/9/2022       RE: Alcides Beauchamp  [address]  [place] [address]     Dear Colleague,    Thank you for referring your patient, Alcides Beauchamp, to the [facility] [place] at [facility]. Please see a copy of my visit note below.    Alcides Beauchamp is a 31 year old male who is being evaluated via a billable video visit.      The patient has been notified of following:     \"This video visit will be conducted via a call between you and your physician/provider. We have found that certain health care needs can be provided without the need for an in-person physical exam.  This service lets us provide the care you need with a video conversation.  If a prescription is necessary we can send it directly to your pharmacy.  If lab work is needed we can place an order for that and you can then stop by our lab to have the test done at a later time.    Video visits are billed at different rates depending on your insurance coverage.  Please reach out to your insurance provider with any questions.    If during the course of the call the physician/provider feels a video visit is not appropriate, you will not be charged for this service.\"    Patient has given verbal consent for Video visit? Yes  How would you like to obtain your AVS? MyChart  If you are dropped from the video visit, the video invite should be resent to: Send to e-mail at: [email or web address]  Will anyone else be joining your video visit? No  {If patient encounters technical issues they should call [phone]      Video-Visit Details    Type of service:  Video Visit    Video Start Time: 2:05 PM  Video End Time: 2:18 PM    Originating Location (pt. Location): Home    Distant Location (provider location):  [facility] [place]     Platform used for Video Visit: "[employer]    During this virtual visit the patient is located in [place], patient verifies this as the " "location during the entirety of this visit.       Return Bariatric Nutrition Consultation Note    Reason For Visit: Nutrition Assessment    Alcides Beauchamp is a 31 year old presenting today for a return bariatric nutrition consult.   Pt is interested in laparoscopic sleeve gastrectomy or RNY with Dr. Sanchez expected surgery in TBD.  Patient is accompanied by self.  This is pt's second of 3 required nutrition visits prior to surgery.     Pt referred by ANITHA Johnson on February 15, 2022.  Patient with Co-morbidities of obesity including:  Type II DM no  Renal Failure no  Sleep apnea yes  Hypertension no   Dyslipidemia no  Joint pain no  Back pain no  GERD no     Support System Reviewed With Patient 10/21/2021   Who do you have in your support network that can be available to help you for the first 2 weeks after surgery? wife and family   Who can you count on for support throughout your weight loss surgery journey? wife and family       ANTHROPOMETRICS:  Estimated body mass index is 48.02 kg/m  as calculated from the following:    Height as of 2/7/22: 1.702 m (5' 7\").    Weight as of 2/7/22: 139.1 kg (306 lb 9.6 oz).     Current weight (3/9/22): 305 lbs per pt    Required weight loss goal pre-op: -20 lbs from initial consult weight (goal weight 285 lbs or less before surgery)       10/21/2021   I have tried the following methods to lose weight Weight Loss Surgery       Weight Loss Questions Reviewed With Patient 10/21/2021   How long have you been overweight? Since puberty       SUPPLEMENT INFORMATION:  Vitamin D    Saxenda    NUTRITION HISTORY:  Hx of lap band (2009). Has gained ~100 lbs in the last year. Pt has been struggling with consuming large amounts of \"junk\" food. Wife is not cooking d/t being pregnant with twins. Struggles with large portion sizes.     Pt waiting for Dr. Carnes to determine if gastric sleeve or RNY is the best option.    3/9/22: Pt states he has noticed Saxenda working. He has less " "cravings and a lower appetite. Unsure of best time to be taking it-has been taking right before bed. Dr. Sanchez would like to do an endoscopy before deciding which surgery to pursue. Overall has improved diet- focused on more protein and vegetables and less processed foods.    Recall Diet Questions Reviewed With Patient 10/21/2021   Describe what you typically consume for breakfast (typical or most recent): no breakfast   Describe what you typically consume for lunch (typical or most recent): fries burger ,pop   Describe what you typically consume for supper (typical or most recent): mozzrilla stick pop   Describe what you typically consume as snacks (typical or most recent): anything chips   How many ounces of water, or other low calorie drinks, do you drink daily (8 oz=1 glass)? 64 oz or more   How many ounces of caffeine (coffee, tea, pop) do you drink daily (8 oz=1 glass)? 32 oz   How many ounces of carbonated (pop, beer, sparkling water) drinks do you drinky daily (8 oz=1 glass)? 8 oz   How many ounces of juice, pop, sweet tea, sports drinks, protein drinks, other sweetened drinks, do you drink daily (8 oz=1 glass)? -   How many ounces of milk do you drink daily (8 oz=1 glass) 8 oz   Please indicate the type of milk: 1%   How often do you drink alcohol? Monthly or less   If you do drink alcohol, how many drinks might you have in a day? (one drink = 5 oz. wine, 1 can/bottle of beer, 1 shot liquor) 1 or 2       Eating Habits 10/21/2021   Do you have any dietary restrictions? No   Do you currently binge eat (eat a large amount of food in a short time)? No   Are you an emotional eater? No   Do you get up to eat after falling asleep? No   What foods do you crave? anything chocolate,chips     Progress on Previous Goals  Relating To Eatin) Focus on portion sizes met, continues    Portion Control Without Measuring  [email or web address]     2) 9\" Plate method (1/2 plate non-starchy vegetables/fruit, " 1/4 plate lean protein, 1/4 plate whole grain starch - no more than 1/2 cup carb/meal) improving, continues  3) Eat 3 meals per day improving, continues  4) Consume 60-90 g protein per day met, continues  5) Track calories using MyFitness pal (1900 calories per day) not met    Relating to dietary supplements:  1) Start a multivitamin containing iron daily not met    ADDITIONAL INFORMATION:  Physical activity- moves around a lot at work    Dining Out History Reviewed With Patient 10/21/2021   How often do you dine out? A couple of times a week.   Where do you dine out? (select all that apply) sit-down restaurants, fast food chains   What types of food do you order when you dine out? steak burger potatoe pop       Physical Activity Reviewed With Patient 2/7/2022   How often do you exercise? Never   What is the duration of your exercise (in minutes)? -   What types of exercise do you do? -   What keeps you from being more active?  Lack of Time         NUTRITION DIAGNOSIS:  Obesity r/t long history of positive energy balance aeb BMI >30 kg/m2.    INTERVENTION:  Intervention Provided/Education Provided on post-op diet guidelines, vitamins/minerals essential post-operatively, GI anatomy of bariatric surgeries, ways to help prepare for post-op diet guidelines pre-operatively, portion/calorie-control, mindful eating and sources of protein.  Patient demonstrates understanding. Provided pt with list of goals RD contact information.      Questions Reviewed With Patient 10/21/2021   How ready are you to make changes regarding your weight? Number 1 = Not ready at all to make changes up to 10 = very ready. 10   How confident are you that you can change? 1 = Not confident that you will be successful making changes up to 10 = very confident. 10       Expected Engagement: good    GOALS:  1) Track calories using MyFitness pal (1900 calories per day)   2) Work towards 3 meals per day  3) Increase physical activity as able  4) Continue  focusing on lean proteins and vegetables and avoiding snacking    Relating to dietary supplements:  1) Start a multivitamin containing iron daily      Protein Sources for Weight Loss  [email or web address]     Carbohydrates  [email or web address]     Mindful Eating  [email or web address]://[email or web address]     Summary of Volumetrics Eating Plan  [email or web address]     Diet Guidelines after Weight Loss Surgery  [email or web address]     Seated Exercises for Arms and Legs (can be done before or after surgery)  [email or web address]    Follow up: 1 month, prn    Time spent with patient: 13 minutes.  TORI BARROS RD, LD

## 2022-03-10 NOTE — PATIENT INSTRUCTIONS
Hi Amr!    Follow-up with RD in 1 month    Thank you,    Zenia Cardona, RD, LD  If you would like to schedule or reschedule an appointment with the RD, please call [phone]    Nutrition Goals  1) Track calories using MyFitness pal (1900 calories per day)   2) Work towards 3 meals per day  3) Increase physical activity as able  4) Continue focusing on lean proteins and vegetables and avoiding snacking    Relating to dietary supplements:  1) Start a multivitamin containing iron daily   -Ex. Centrum Daily      Interested in working with a health ? Health coaches work with you to improve your overall health and wellbeing. They look at the whole person, and may involve discussion of different areas of life, including, but not limited to the four pillars of health (sleep, exercise, nutrition, and stress management). Discuss with your care team if you would like to start working a health .    Health Coaching-3 Pack:    $99 for three health coaching visits    Visits may be done in person or via phone    Coaching is a partnership between the  and the client; Coaches do not prescribe or diagnose    Coaching helps inspire the client to reach his/her personal goals    COMPREHENSIVE WEIGHT MANAGEMENT PROGRAM  VIRTUAL SUPPORT GROUPS    For Support Group Information:      We offer support groups for patients who are working on weight loss and considering, preparing for or have had weight loss surgery.   There is no cost for this opportunity.  You are invited to attend the?Virtual Support Groups?provided by any of the following locations:    1. [facility] via [employer] Teams with Amy Benavidez RN  2.   [place] via [employer] Teams with Donald Obrien, PhD, LP  3.   [place] via [employer] Teams with Cristin Baltazar RN  4.   [facility] via Criteo Teams with Cristin Belcher, [facility]    The following Support Group information can also be found on our  "website:  [email or web address]-[email or web address]-groups      [facility] Weight Loss Surgery Support Group    [facility] Weight Loss Surgery Support Group  The support group is a patient-lead forum that meets monthly to share experiences, encouragement and education. It is open to those who have had weight loss surgery, are scheduled for surgery, and those who are considering surgery.   WHEN: This group meets on the 3rd Wednesday of each month from 5:00PM - 6:00PM virtually using Microsoft Teams.   FACILITATOR: Led by Amy Benavidez RD, LD, RN, the program's Clinical Coordinator.   TO REGISTER: Please contact the clinic via iHookup [employer] or call the nurse line directly at [phone] to inform our staff that you would like an invite sent to you and to let us know the email you would like the invite sent to. Prior to the meeting, a link with directions on how to join the meeting will be sent to you.    2022 Meetings  January 19: \"Let's Talk\" a time for the group to share.  February 16: \"Let's Talk\" a time for the group to share.  March 16: Guest Speakers: Psychologists, Lu Lees, PhD,LP and Marisol Quiles PsDanny,  April 20: Guest Speaker: Health , Asmita Akhtar, [facility],CHES, [facility]  May 18: Guest Speaker: DietitianMiguel, HAYES, LP  Mone 15: \"Let's Talk\" a time for the group to share.  July 20: \"Let's Talk\" a time for the group to share.  August 17: TBA  September 21: TBA  October 19: Guest Speaker: Dr Dale Remy MD Pulmonologist and Sleep Medicine Physician, \"Getting a Good Night's Sleep\".  November 16: TBA  December 21: TBA    [facility] Support Groups    Connections: Bariatric Care Support Group?  This is open to all [facility] (and those external to this program) pre- and post- operative bariatric surgery patients as well as their support system.   WHEN: This group meets the 2nd Tuesday of each month " "from 6:30 PM - 8:00 PM virtually using Microsoft Teams.   FACILITATOR: Led by Donald Obrien, Ph.D who is a Licensed Psychologist with the [facility] Comprehensive Weight Management Program.   TO REGISTER: Please send an email to Donald Obrien, Ph.D., LP at?[email or web address]?if you would like an invitation to the group and to learn about using Microsoft Teams.    2022 Meetings January 11: Tash Mendoza, PharmD, Pharmacy Resident at [facility], \"Medications and Bariatric Surgery\".  February 8: Open Forum  March 8  April 12  May 10  Mone 14    Connections: Post-Operative Bariatric Surgery Support Group  This is a support group for [facility] bariatric patients (and those external to [facility]) who have had bariatric surgery and are at least 3 months post-surgery.  WHEN: This support group meets the 4th Wednesday of the month from 11:00 AM - 12:00 PM virtually using Microsoft Teams.   FACILITATOR: Led by Certified Bariatric Nurse, Cristin Baltazar RN.   TO REGISTER: Please send an email to Cristin at [email or web address] if you would like an invitation to the group and to learn about using Microsoft Teams.    2022 Meetings  January 26  February 23  March 23  April 27  May 25  Mone 22    [facility] Healthy Lifestyle Virtual Support Group    Healthy Lifestyle Virtual Support Group?  This is 60 minutes of small group guided discussion, support and resources. All are welcome who want a healthy lifestyle.  WHEN: This group meets monthly on a Friday from 12:30 PM - 1:30 PM virtually using Microsoft Teams.   FACILITATOR: Led by National Board Certified Health and , Cristin Belcher [facility].   TO REGISTER: Please send an email to Cristin at?[email or web address] to receive monthly invites to the group or if you have any questions about having a health .  Prior to the meeting, a link with directions on how to join the meeting will be " "sent to you.    2022 Meetings  January 21: Antonella Domingo MS, RN, CIC, CBN, \"Healthy Habits\"  February 25: Open Forum  March 18: \"Setting Limits and Boundaries\"  April 29: Sanjuanita Royal RD, \"Meal Planning Made Easy\"  May 20: Open Forum  Mone: To be determined                  "

## 2022-03-31 ENCOUNTER — TELEPHONE (OUTPATIENT)
Dept: ENDOCRINOLOGY | Facility: CLINIC | Age: 32
End: 2022-03-31
Payer: COMMERCIAL

## 2022-03-31 ENCOUNTER — TELEPHONE (OUTPATIENT)
Dept: GASTROENTEROLOGY | Facility: CLINIC | Age: 32
End: 2022-03-31
Payer: COMMERCIAL

## 2022-03-31 DIAGNOSIS — K44.9 HIATAL HERNIA: Primary | ICD-10-CM

## 2022-03-31 DIAGNOSIS — E66.813 CLASS 3 SEVERE OBESITY DUE TO EXCESS CALORIES WITH SERIOUS COMORBIDITY AND BODY MASS INDEX (BMI) OF 45.0 TO 49.9 IN ADULT (H): ICD-10-CM

## 2022-03-31 DIAGNOSIS — E66.01 CLASS 3 SEVERE OBESITY DUE TO EXCESS CALORIES WITH SERIOUS COMORBIDITY AND BODY MASS INDEX (BMI) OF 45.0 TO 49.9 IN ADULT (H): ICD-10-CM

## 2022-03-31 DIAGNOSIS — Z20.822 ENCOUNTER FOR LABORATORY TESTING FOR COVID-19 VIRUS: Primary | ICD-10-CM

## 2022-03-31 DIAGNOSIS — Z11.59 ENCOUNTER FOR SCREENING FOR OTHER VIRAL DISEASES: Primary | ICD-10-CM

## 2022-03-31 DIAGNOSIS — Z98.84 HISTORY OF REMOVAL OF LAPAROSCOPIC GASTRIC BANDING DEVICE: ICD-10-CM

## 2022-03-31 NOTE — TELEPHONE ENCOUNTER
Scheduling Details      Caller : Huang Tavares  (Please ask for phone number if not scheduled by patient)    Type of Procedure Scheduled: EGD  Which Colonoscopy Prep was Sent?:   [facility]LA CF PATIENTS & GROEN'S PATIENTS NEEDS EXTENDED PREP  Surgeon: Dr. Sanchez   Date of Procedure: 04/13/2022  Location: [facility]      Sedation Type: CS  Conscious Sedation- Needs  for 6 hours after the procedure  MAC/General-Needs  for 24 hours after procedure    Pre-op Required at [facility] [place], [place] and OR for MAC sedation: N  (advise patient they will need a pre-op prior to procedure -)      Informed patient they will need an adult  Y  Cannot take any type of public or medical transportation alone    Pre-Procedure Covid test to be completed at [facility] or Externally:     Confirmed Nurse will call to complete assessment Y    Additional comments: N

## 2022-03-31 NOTE — TELEPHONE ENCOUNTER
Patient has been scheduled for an endoscopy with Dr. Sanchez on 4/13 at 2 p.m. [place].   COVID testing scheduled at the [facility] on Sat., 4/9 at 12:00 p.m.

## 2022-04-01 ENCOUNTER — VIRTUAL VISIT (OUTPATIENT)
Dept: ENDOCRINOLOGY | Facility: CLINIC | Age: 32
End: 2022-04-01
Payer: COMMERCIAL

## 2022-04-01 ENCOUNTER — TELEPHONE (OUTPATIENT)
Dept: ENDOCRINOLOGY | Facility: CLINIC | Age: 32
End: 2022-04-01

## 2022-04-01 DIAGNOSIS — Z71.3 NUTRITIONAL COUNSELING: Primary | ICD-10-CM

## 2022-04-01 DIAGNOSIS — E66.813 CLASS 3 SEVERE OBESITY DUE TO EXCESS CALORIES WITH SERIOUS COMORBIDITY AND BODY MASS INDEX (BMI) OF 45.0 TO 49.9 IN ADULT (H): ICD-10-CM

## 2022-04-01 DIAGNOSIS — E66.01 CLASS 3 SEVERE OBESITY DUE TO EXCESS CALORIES WITH SERIOUS COMORBIDITY AND BODY MASS INDEX (BMI) OF 45.0 TO 49.9 IN ADULT (H): ICD-10-CM

## 2022-04-01 PROCEDURE — 97803 MED NUTRITION INDIV SUBSEQ: CPT | Mod: 95 | Performed by: DIETITIAN, REGISTERED

## 2022-04-01 NOTE — LETTER
"4/1/2022       RE: Alcides Beauchamp  [address]  [facility] [address]     Dear Colleague,    Thank you for referring your patient, Alcides Beauchamp, to the [facility]NAG[facility] [place] at [facility]. Please see a copy of my visit note below.    Alcides Beauchamp is a 31 year old male who is being evaluated via a billable telephone visit.     The patient has been notified of following:     \"This telephone visit will be conducted via a call between you and your physician/provider. We have found that certain health care needs can be provided without the need for a physical exam.  This service lets us provide the care you need with a short phone conversation.  If a prescription is necessary we can send it directly to your pharmacy.  If lab work is needed we can place an order for that and you can then stop by our lab to have the test done at a later time.    Telephone visits are billed at different rates depending on your insurance coverage. During this emergency period, for some insurers they may be billed the same as an in-person visit.  Please reach out to your insurance provider with any questions.    If during the course of the call the physician/provider feels a telephone visit is not appropriate, you will not be charged for this service.\"    Patient has given verbal consent for Telephone visit?  Yes    How would you like to obtain your AVS? Linda    Phone call duration: 13 minutes    During this virtual visit the patient is located in [place], patient verifies this as the location during the entirety of this visit.          Return Bariatric Nutrition Consultation Note    Reason For Visit: Nutrition Assessment    Alcides Beauchamp is a 31 year old presenting today for a return bariatric nutrition consult.   Pt is interested in laparoscopic sleeve gastrectomy or RNY with Dr. Sanchez expected surgery in TBD.  Patient is accompanied by self.  This is pt's 3rd of 3 " "required nutrition visits prior to surgery.     Pt referred by ANITHA Johnson on February 15, 2022.  Patient with Co-morbidities of obesity including:  Type II DM no  Renal Failure no  Sleep apnea yes  Hypertension no   Dyslipidemia no  Joint pain no  Back pain no  GERD no     Support System Reviewed With Patient 10/21/2021   Who do you have in your support network that can be available to help you for the first 2 weeks after surgery? wife and family   Who can you count on for support throughout your weight loss surgery journey? wife and family       ANTHROPOMETRICS:  Estimated body mass index is 48.02 kg/m  as calculated from the following:    Height as of 2/7/22: 1.702 m (5' 7\").    Weight as of 2/7/22: 139.1 kg (306 lb 9.6 oz).     Current weight (4/1/22): 305 lbs per pt (has not checked recently)    Required weight loss goal pre-op: -20 lbs from initial consult weight (goal weight 285 lbs or less before surgery)       10/21/2021   I have tried the following methods to lose weight Weight Loss Surgery       Weight Loss Questions Reviewed With Patient 10/21/2021   How long have you been overweight? Since puberty       SUPPLEMENT INFORMATION:  Vitamin D    Saxenda    NUTRITION HISTORY:  Hx of lap band (2009). Has gained ~100 lbs in the last year. Pt has been struggling with consuming large amounts of \"junk\" food. Wife is not cooking d/t being pregnant with twins. Struggles with large portion sizes.     Pt waiting for Dr. Carnes to determine if gastric sleeve or RNY is the best option.    3/9/22: Pt states he has noticed Saxenda working. He has less cravings and a lower appetite. Unsure of best time to be taking it-has been taking right before bed. Dr. Sanchez would like to do an endoscopy before deciding which surgery to pursue. Overall has improved diet- focused on more protein and vegetables and less processed foods.    4/1/22: Pt reports he has stopped taking Saxenda d/t sleepiness and body aches. " Trying to decrease carbohydrates in his meals. Still only eating one meal per day in the evenings and snacking occasionally. States he does not think he will be able to change his diet habits and would like to do a modified liquid diet prior to surgery to reach his goal weight.      Recall Diet Questions Reviewed With Patient 10/21/2021   Describe what you typically consume for breakfast (typical or most recent): no breakfast   Describe what you typically consume for lunch (typical or most recent): fries burger ,pop   Describe what you typically consume for supper (typical or most recent): mozzrilla stick pop   Describe what you typically consume as snacks (typical or most recent): anything chips   How many ounces of water, or other low calorie drinks, do you drink daily (8 oz=1 glass)? 64 oz or more   How many ounces of caffeine (coffee, tea, pop) do you drink daily (8 oz=1 glass)? 32 oz   How many ounces of carbonated (pop, beer, sparkling water) drinks do you drinky daily (8 oz=1 glass)? 8 oz   How many ounces of juice, pop, sweet tea, sports drinks, protein drinks, other sweetened drinks, do you drink daily (8 oz=1 glass)? -   How many ounces of milk do you drink daily (8 oz=1 glass) 8 oz   Please indicate the type of milk: 1%   How often do you drink alcohol? Monthly or less   If you do drink alcohol, how many drinks might you have in a day? (one drink = 5 oz. wine, 1 can/bottle of beer, 1 shot liquor) 1 or 2       Eating Habits 10/21/2021   Do you have any dietary restrictions? No   Do you currently binge eat (eat a large amount of food in a short time)? No   Are you an emotional eater? No   Do you get up to eat after falling asleep? No   What foods do you crave? anything chocolate,chips     Progress on Previous Goals  1) Track calories using [place] pal (1900 calories per day) not met  2) Work towards 3 meals per day not met  3) Increase physical activity as able met  4) Continue focusing on lean proteins  and vegetables and avoiding snacking met, continues    Relating to dietary supplements:  1) Start a multivitamin containing iron daily met, continues    ADDITIONAL INFORMATION:  Physical activity- moves around a lot at work    Dining Out History Reviewed With Patient 10/21/2021   How often do you dine out? A couple of times a week.   Where do you dine out? (select all that apply) sit-down restaurants, fast food chains   What types of food do you order when you dine out? steak burger potatoe pop       Physical Activity Reviewed With Patient 2/7/2022   How often do you exercise? Never   What is the duration of your exercise (in minutes)? -   What types of exercise do you do? -   What keeps you from being more active?  Lack of Time         NUTRITION DIAGNOSIS:  Obesity r/t long history of positive energy balance aeb BMI >30 kg/m2.    INTERVENTION:  Intervention Provided/Education Provided on post-op diet guidelines, vitamins/minerals essential post-operatively, GI anatomy of bariatric surgeries, ways to help prepare for post-op diet guidelines pre-operatively, portion/calorie-control, mindful eating and sources of protein.  Patient demonstrates understanding. Provided pt with list of goals RD contact information.      Questions Reviewed With Patient 10/21/2021   How ready are you to make changes regarding your weight? Number 1 = Not ready at all to make changes up to 10 = very ready. 10   How confident are you that you can change? 1 = Not confident that you will be successful making changes up to 10 = very confident. 10       Expected Engagement: good    GOALS:  1) Pre plan meals and snacks   2) Eat 3 meals per day  3) Track 1900 calories using [employer] Pal as able      Protein Sources for Weight Loss  [email or web address]     Carbohydrates  [email or web address]     Mindful Eating  [email or web address]     Summary of Volumetrics Eating Plan  [email or web address]     Diet Guidelines  after Weight Loss Surgery  [email or web address]     Seated Exercises for Arms and Legs (can be done before or after surgery)  [email or web address]    Follow up: 3 weeks before surgery (per pt request), prn    Time spent with patient: 13 minutes.  TORI BARROS RD, LD

## 2022-04-01 NOTE — PATIENT INSTRUCTIONS
Hi Amr!    Follow-up with RD 3 weeks before surgery    Thank you,    Zenia Cardona, RD, LD  If you would like to schedule or reschedule an appointment with the RD, please call [phone]    Nutrition Goals  1) Pre plan meals and snacks   2) Eat 3 meals per day  3) Track 1900 calories using MyPouring Pounds Pal as able        Interested in working with a health ? Health coaches work with you to improve your overall health and wellbeing. They look at the whole person, and may involve discussion of different areas of life, including, but not limited to the four pillars of health (sleep, exercise, nutrition, and stress management). Discuss with your care team if you would like to start working a health .    Health Coaching-3 Pack:    $99 for three health coaching visits    Visits may be done in person or via phone    Coaching is a partnership between the  and the client; Coaches do not prescribe or diagnose    Coaching helps inspire the client to reach his/her personal goals    COMPREHENSIVE WEIGHT MANAGEMENT PROGRAM  VIRTUAL SUPPORT GROUPS    For Support Group Information:      We offer support groups for patients who are working on weight loss and considering, preparing for or have had weight loss surgery.   There is no cost for this opportunity.  You are invited to attend the?Virtual Support Groups?provided by any of the following locations:    1. [facility] via [employer] Teams with Amy Benavidez RN  2.   [place] via [employer] with Donald Obrien, PhD, LP  3.   [place] via [employer] with Cristin Baltazar RN  4.   [facility] via [employer] Teams with Cristin Belcher, Pending sale to [facility]    The following Support Group information can also be found on our website:  [email or web address]      [facility] Weight Loss Surgery Support Group    [facility] Weight Loss Surgery Support Group  The support group is a patient-lead forum  "that meets monthly to share experiences, encouragement and education. It is open to those who have had weight loss surgery, are scheduled for surgery, and those who are considering surgery.   WHEN: This group meets on the 3rd Wednesday of each month from 5:00PM - 6:00PM virtually using Microsoft Teams.   FACILITATOR: Led by Amy Benavidez RD, LD, RN, the program's Clinical Coordinator.   TO REGISTER: Please contact the clinic via Kite or call the nurse line directly at [phone] to inform our staff that you would like an invite sent to you and to let us know the email you would like the invite sent to. Prior to the meeting, a link with directions on how to join the meeting will be sent to you.    2022 Meetings  January 19: \"Let's Talk\" a time for the group to share.  February 16: \"Let's Talk\" a time for the group to share.  March 16: Guest Speakers: Psychologists, Lu Lees, PhD,LP and Marisol Quiles PsyD,  April 20: Guest Speaker: Health Asmita, [facility],CHES, [facility]  May 18: Guest Speaker: Dietitian, Miguel Chambers, HAYES, LP  Mone 15: \"Let's Talk\" a time for the group to share.  July 20: \"Let's Talk\" a time for the group to share.  August 17: TBA  September 21: TBA  October 19: Guest Speaker: Dr Dale Remy MD Pulmonologist and Sleep Medicine Physician, \"Getting a Good Night's Sleep\".  November 16: TBA  December 21: TBA    [facility] Clinics and [facility] Support Groups    Connections: Bariatric Care Support Group?  This is open to all [facility] (and those external to this program) pre- and post- operative bariatric surgery patients as well as their support system.   WHEN: This group meets the 2nd Tuesday of each month from 6:30 PM - 8:00 PM virtually using Microsoft Teams.   FACILITATOR: Led by Donald Obrien, Ph.D who is a Licensed Psychologist with the [facility] Comprehensive Weight Management Program.   TO REGISTER: Please send an email to Donald Obrien, Ph.D., " "LP at?[email or web address]?if you would like an invitation to the group and to learn about using Microsoft Teams.    2022 Meetings January 11: Tash Mendoza, PharmD, Pharmacy Resident at [facility], \"Medications and Bariatric Surgery\".  February 8: Open Forum  March 8  April 12  May 10  Mone 14    Connections: Post-Operative Bariatric Surgery Support Group  This is a support group for [facility] bariatric patients (and those external to [facility]) who have had bariatric surgery and are at least 3 months post-surgery.  WHEN: This support group meets the 4th Wednesday of the month from 11:00 AM - 12:00 PM virtually using Microsoft Teams.   FACILITATOR: Led by Certified Bariatric Nurse, Cristin Baltazar RN.   TO REGISTER: Please send an email to Cristin at [email or web address] if you would like an invitation to the group and to learn about using Microsoft Teams.    2022 Meetings  January 26  February 23  March 23  April 27  May 25  Mone 22    [facility] Healthy Lifestyle Virtual Support Group    Healthy Lifestyle Virtual Support Group?  This is 60 minutes of small group guided discussion, support and resources. All are welcome who want a healthy lifestyle.  WHEN: This group meets monthly on a Friday from 12:30 PM - 1:30 PM virtually using Microsoft Teams.   FACILITATOR: Led by National Board Certified Health and , Cristin Belcher [facility].   TO REGISTER: Please send an email to Cristin [email or web address] to receive monthly invites to the group or if you have any questions about having a health .  Prior to the meeting, a link with directions on how to join the meeting will be sent to you.    2022 Meetings January 21: Antonella Domingo MS, RN, CIC, CBN, \"Healthy Habits\"  February 25: Open Forum  March 18: \"Setting Limits and Boundaries\"  April 29: Sanjuanita Royal RD, \"Meal Planning Made Easy\"  May 20: Open Forum  June: To be " determined              Hi Amr!    Follow-up with RD 3 weeks before surgery    Thank you,    Zenia Cardona, RD, LD  If you would like to schedule or reschedule an appointment with the RD, please call [phone]    Nutrition Goals  1) Pre plan meals and snacks   2) Eat 3 meals per day  3) Track 1900 calories using MyDonews Pal as able        Interested in working with a health ? Health coaches work with you to improve your overall health and wellbeing. They look at the whole person, and may involve discussion of different areas of life, including, but not limited to the four pillars of health (sleep, exercise, nutrition, and stress management). Discuss with your care team if you would like to start working a health .    Health Coaching-3 Pack:    $99 for three health coaching visits    Visits may be done in person or via phone    Coaching is a partnership between the  and the client; Coaches do not prescribe or diagnose    Coaching helps inspire the client to reach his/her personal goals    COMPREHENSIVE WEIGHT MANAGEMENT PROGRAM  VIRTUAL SUPPORT GROUPS    For Support Group Information:      We offer support groups for patients who are working on weight loss and considering, preparing for or have had weight loss surgery.   There is no cost for this opportunity.  You are invited to attend the?Virtual Support Groups?provided by any of the following locations:    2. [facility] via [employer] Teams with Amy Benavidez RN  2.   [place] via [employer] with Donald Obrien, PhD, LP  3.   [place] via [employer] with Cristin Baltazar RN  4.   [facility] via [employer]O Teams with Cristin Belcher, [facility]    The following Support Group information can also be found on our website:  [email or web address]      [facility] Weight Loss Surgery Support Group    [facility] Weight Loss Surgery Support Group  The support group  "is a patient-lead forum that meets monthly to share experiences, encouragement and education. It is open to those who have had weight loss surgery, are scheduled for surgery, and those who are considering surgery.   WHEN: This group meets on the 3rd Wednesday of each month from 5:00PM - 6:00PM virtually using Microsoft Teams.   FACILITATOR: Led by Amy Benavidez, HAYES, LD, RN, the program's Clinical Coordinator.   TO REGISTER: Please contact the clinic via M[employer]o or call the nurse line directly at [phone] to inform our staff that you would like an invite sent to you and to let us know the email you would like the invite sent to. Prior to the meeting, a link with directions on how to join the meeting will be sent to you.    2022 Meetings  January 19: \"Let's Talk\" a time for the group to share.  February 16: \"Let's Talk\" a time for the group to share.  March 16: Guest Speakers: Psychologists, Lu Lees, PhD,LP and Marisol Quiles PsyD,  April 20: Guest Speaker: Health Asmita, [facility],CHES, CPT  May 18: Guest Speaker: Dietitian, Miguel Chambers, HAYES, LP  Mone 15: \"Let's Talk\" a time for the group to share.  July 20: \"Let's Talk\" a time for the group to share.  August 17: TBA  September 21: TBA  October 19: Guest Speaker: Dr Dale Remy MD Pulmonologist and Sleep Medicine Physician, \"Getting a Good Night's Sleep\".  November 16: TBA  December 21: TBA    [facility] Clinics and Specialty [facility] Support Groups    Connections: Bariatric Care Support Group?  This is open to all [facility] (and those external to this program) pre- and post- operative bariatric surgery patients as well as their support system.   WHEN: This group meets the 2nd Tuesday of each month from 6:30 PM - 8:00 PM virtually using Microsoft Teams.   FACILITATOR: Led by Donald Obrien, Ph.D who is a Licensed Psychologist with the [facility] Comprehensive Weight Management Program.   TO REGISTER: Please send an email " "to Donald Obrien, Ph.D., LP at?[email or web address]?if you would like an invitation to the group and to learn about using Microsoft Teams.    2022 Meetings January 11: Tash Mendoza, PharmD, Pharmacy Resident at [facility], \"Medications and Bariatric Surgery\".  February 8: Open Forum  March 8  April 12  May 10  Mone 14    Connections: Post-Operative Bariatric Surgery Support Group  This is a support group for [facility] bariatric patients (and those external to [facility]) who have had bariatric surgery and are at least 3 months post-surgery.  WHEN: This support group meets the 4th Wednesday of the month from 11:00 AM - 12:00 PM virtually using Microsoft Teams.   FACILITATOR: Led by Certified Bariatric Nurse, Cristin Baltazar RN.   TO REGISTER: Please send an email to Cristin at [email or web address] if you would like an invitation to the group and to learn about using Microsoft Teams.    2022 Meetings  January 26  February 23  March 23  April 27  May 25  Mone 22    [facility] Healthy Lifestyle Virtual Support Group    Healthy Lifestyle Virtual Support Group?  This is 60 minutes of small group guided discussion, support and resources. All are welcome who want a healthy lifestyle.  WHEN: This group meets monthly on a Friday from 12:30 PM - 1:30 PM virtually using Microsoft Teams.   FACILITATOR: Led by National Board Certified Health and , Cristin Belcher [facility].   TO REGISTER: Please send an email to Cristin [email or web address] to receive monthly invites to the group or if you have any questions about having a health .  Prior to the meeting, a link with directions on how to join the meeting will be sent to you.    2022 Meetings January 21: Antonella Domingo MS, RN, CIC, CBN, \"Healthy Habits\"  February 25: Open Forum  March 18: \"Setting Limits and Boundaries\"  April 29: Sanjuanita Royal RD, \"Meal Planning Made Easy\"  May 20: Open " Forum  [place]: To be determined

## 2022-04-01 NOTE — TELEPHONE ENCOUNTER
Patient will call back to schedule with Zenia Cardona before his surgery he will call back when he has all the details

## 2022-04-01 NOTE — PROGRESS NOTES
"Alcides Beauchamp is a 31 year old male who is being evaluated via a billable telephone visit.     The patient has been notified of following:     \"This telephone visit will be conducted via a call between you and your physician/provider. We have found that certain health care needs can be provided without the need for a physical exam.  This service lets us provide the care you need with a short phone conversation.  If a prescription is necessary we can send it directly to your pharmacy.  If lab work is needed we can place an order for that and you can then stop by our lab to have the test done at a later time.    Telephone visits are billed at different rates depending on your insurance coverage. During this emergency period, for some insurers they may be billed the same as an in-person visit.  Please reach out to your insurance provider with any questions.    If during the course of the call the physician/provider feels a telephone visit is not appropriate, you will not be charged for this service.\"    Patient has given verbal consent for Telephone visit?  Yes    How would you like to obtain your AVS? Mychart    Phone call duration: 13 minutes    During this virtual visit the patient is located in [place], patient verifies this as the location during the entirety of this visit.          Return Bariatric Nutrition Consultation Note    Reason For Visit: Nutrition Assessment    Alcides Beauchamp is a 31 year old presenting today for a return bariatric nutrition consult.   Pt is interested in laparoscopic sleeve gastrectomy or RNY with Dr. Sanchez expected surgery in [place].  Patient is accompanied by self.  This is pt's 3rd of 3 required nutrition visits prior to surgery.     Pt referred by ANITHA Johnson on February 15, 2022.  Patient with Co-morbidities of obesity including:  Type II DM no  Renal Failure no  Sleep apnea yes  Hypertension no   Dyslipidemia no  Joint pain no  Back pain no  GERD no     Support System Reviewed With " "Patient 10/21/2021   Who do you have in your support network that can be available to help you for the first 2 weeks after surgery? wife and family   Who can you count on for support throughout your weight loss surgery journey? wife and family       ANTHROPOMETRICS:  Estimated body mass index is 48.02 kg/m  as calculated from the following:    Height as of 2/7/22: 1.702 m (5' 7\").    Weight as of 2/7/22: 139.1 kg (306 lb 9.6 oz).     Current weight (4/1/22): 305 lbs per pt (has not checked recently)    Required weight loss goal pre-op: -20 lbs from initial consult weight (goal weight 285 lbs or less before surgery)       10/21/2021   I have tried the following methods to lose weight Weight Loss Surgery       Weight Loss Questions Reviewed With Patient 10/21/2021   How long have you been overweight? Since puberty       SUPPLEMENT INFORMATION:  Vitamin D    Saxenda    NUTRITION HISTORY:  Hx of lap band (2009). Has gained ~100 lbs in the last year. Pt has been struggling with consuming large amounts of \"junk\" food. Wife is not cooking d/t being pregnant with twins. Struggles with large portion sizes.     Pt waiting for Dr. Carnes to determine if gastric sleeve or RNY is the best option.    3/9/22: Pt states he has noticed Saxenda working. He has less cravings and a lower appetite. Unsure of best time to be taking it-has been taking right before bed. Dr. Sanchez would like to do an endoscopy before deciding which surgery to pursue. Overall has improved diet- focused on more protein and vegetables and less processed foods.    4/1/22: Pt reports he has stopped taking Saxenda d/t sleepiness and body aches. Trying to decrease carbohydrates in his meals. Still only eating one meal per day in the evenings and snacking occasionally. States he does not think he will be able to change his diet habits and would like to do a modified liquid diet prior to surgery to reach his goal weight.      Recall Diet Questions Reviewed " With Patient 10/21/2021   Describe what you typically consume for breakfast (typical or most recent): no breakfast   Describe what you typically consume for lunch (typical or most recent): fries burger ,pop   Describe what you typically consume for supper (typical or most recent): mozzrilla stick pop   Describe what you typically consume as snacks (typical or most recent): anything chips   How many ounces of water, or other low calorie drinks, do you drink daily (8 oz=1 glass)? 64 oz or more   How many ounces of caffeine (coffee, tea, pop) do you drink daily (8 oz=1 glass)? 32 oz   How many ounces of carbonated (pop, beer, sparkling water) drinks do you drinky daily (8 oz=1 glass)? 8 oz   How many ounces of juice, pop, sweet tea, sports drinks, protein drinks, other sweetened drinks, do you drink daily (8 oz=1 glass)? -   How many ounces of milk do you drink daily (8 oz=1 glass) 8 oz   Please indicate the type of milk: 1%   How often do you drink alcohol? Monthly or less   If you do drink alcohol, how many drinks might you have in a day? (one drink = 5 oz. wine, 1 can/bottle of beer, 1 shot liquor) 1 or 2       Eating Habits 10/21/2021   Do you have any dietary restrictions? No   Do you currently binge eat (eat a large amount of food in a short time)? No   Are you an emotional eater? No   Do you get up to eat after falling asleep? No   What foods do you crave? anything chocolate,chips     Progress on Previous Goals  1) Track calories using [employer] pal (1900 calories per day) not met  2) Work towards 3 meals per day not met  3) Increase physical activity as able met  4) Continue focusing on lean proteins and vegetables and avoiding snacking met, continues    Relating to dietary supplements:  1) Start a multivitamin containing iron daily met, continues    ADDITIONAL INFORMATION:  Physical activity- moves around a lot at work    Dining Out History Reviewed With Patient 10/21/2021   How often do you dine out? A couple  of times a week.   Where do you dine out? (select all that apply) sit-down restaurants, fast food chains   What types of food do you order when you dine out? shira olivares pop       Physical Activity Reviewed With Patient 2/7/2022   How often do you exercise? Never   What is the duration of your exercise (in minutes)? -   What types of exercise do you do? -   What keeps you from being more active?  Lack of Time         NUTRITION DIAGNOSIS:  Obesity r/t long history of positive energy balance aeb BMI >30 kg/m2.    INTERVENTION:  Intervention Provided/Education Provided on post-op diet guidelines, vitamins/minerals essential post-operatively, GI anatomy of bariatric surgeries, ways to help prepare for post-op diet guidelines pre-operatively, portion/calorie-control, mindful eating and sources of protein.  Patient demonstrates understanding. Provided pt with list of goals RD contact information.      Questions Reviewed With Patient 10/21/2021   How ready are you to make changes regarding your weight? Number 1 = Not ready at all to make changes up to 10 = very ready. 10   How confident are you that you can change? 1 = Not confident that you will be successful making changes up to 10 = very confident. 10       Expected Engagement: good    GOALS:  1) Pre plan meals and snacks   2) Eat 3 meals per day  3) Track 1900 calories using [employer] Pal as able      Protein Sources for Weight Loss  [email or web address]     Carbohydrates  [email or web address]     Mindful Eating  [email or web address]     Summary of Volumetrics Eating Plan  [email or web address]     Diet Guidelines after Weight Loss Surgery  [email or web address]     Seated Exercises for Arms and Legs (can be done before or after surgery)  [email or web address]    Follow up: 3 weeks before surgery (per pt request), prn    Time spent with patient: 13 minutes.  TORI BARROS RD, LD

## 2022-04-06 NOTE — PROGRESS NOTES
During this virtual visit the patient is located in [place], patient verifies this as the location during the entirety of this visit.     Alcides is a 31 year old who is being evaluated via a billable video visit.      How would you like to obtain your AVS? MyChart  If the video visit is dropped, the invitation should be resent by: Text to cell phone:  [phone]  Will anyone else be joining your video visit? No    Video Start Time: 8:56 AM    Video-Visit Details    Type of service:  Video Visit    Video End Time:9:21 AM    Originating Location (pt. Location): Home    Distant Location (provider location):  [facility] [place]     Platform used for Video Visit: Satish Henry NREMT      30 minutes spent on the date of the encounter doing chart review, history and exam, documentation and further activities per the note    Return Medical Weight Management Note     Alcides Beauchamp  MRN:  [id number]  :  1990  REY:  2022    Dear Sanjay Farrell MD,    I had the pleasure of seeing your patient Alcides Beauchamp. He is a 31 year old male who I am continuing to see for treatment of obesity related to:       10/21/2021   I have the following health issues associated with obesity: Heart Disease, High Blood Pressure       Assessment & Plan   Problem List Items Addressed This Visit        Endocrine Diagnoses    Class 3 severe obesity due to excess calories with serious comorbidity and body mass index (BMI) of 45.0 to 49.9 in adult (H)       Other    History of removal of laparoscopic gastric banding device - Primary    Rash    Relevant Orders    Adult Dermatology Referral         INTERVAL HISTORY:  MWM follow up  Hx of lap band and band removed. Wants sleeve or RYGB  EGD with GI in the past that showed HH but CT did not show HH.    Doesn't know current weight, hasn't checked recently.  3 RD visits done  Goal weight to lose to 285 lbs preop  [place]  and side effect of drowsiness/fatigue/body aches.  He was on it for 2 weeks. Reports sensitivity to medications in general. Did fine on Saxenda 0.6mg daily.  EGD 4/13/22 with Dr Sanchez to evaluate for HH  Has tried phentermine and topiramate in the past and wants to retrial.  No CAD or HTN.  No kidney stones hx.     PLAN:  After EGD 4/13/22 if plan for sleeve call [facility] to schedule psych eval ASAP. [phone]  Retrial phentermine/topiramate, tolerated in the past with PCP  Restart saxenda 0.6mg daily and stay on this low dose for now (tolerated 0.6mg dose)  Need to lose to 285 lbs. May need to do preop liquid diet if struggling to lose the last 5-10 lbs prior to surgery  Need to check weight at home, send via [employer] message  Follow up RD monthly.  Follow up 1 month MT Lauren Bloch phone  Follow up Buffy Forbes return MW 3 months       CURRENT WEIGHT:   0 lbs 0 oz    Initial Weight (lbs): 320 lbs  Last Visits Weight: 139.1 kg (306 lb 9.6 oz)          Changes and Difficulties 4/7/2022   I have made the following changes to my diet since my last visit: Cutting carbs.   With regards to my diet, I am still struggling with: Loosing weight.   I have made the following changes to my activity/exercise since my last visit: No changes.   With regards to my activity/exercise, I am still struggling with: Loosing.         MEDICATIONS:   Current Outpatient Medications   Medication Sig Dispense Refill     DULoxetine (CYMBALTA) 30 MG capsule Take 1 capsule (30 mg) by mouth daily       Semaglutide-Weight Management (WEGOVY) 0.25 MG/0.5ML SOAJ Inject 0.25 mg Subcutaneous every 7 days For month 1: 0.25mg weekly x 4 weeks; For month 2: 0.5mg weekly x 4 weeks; Then Buffy will re-evaluate for further increases. 3 mL 0       Weight Loss Medication History Reviewed With Patient 4/7/2022   Which weight loss medications are you currently taking on a regular basis?  None   If you are not taking a weight loss medication that was  prescribed to you, please indicate why: I did not like the side effects   Are you having any side effects from the weight loss medication that we have prescribed you? Yes   If you are having side effects please describe: Oversleeping, body aches, energy loss.       Lab on 10/22/2021   Component Date Value Ref Range Status     WBC Count 10/22/2021 7.4  4.0 - 11.0 10e3/uL Final     RBC Count 10/22/2021 5.52  4.40 - 5.90 10e6/uL Final     Hemoglobin 10/22/2021 14.7  13.3 - 17.7 g/dL Final     Hematocrit 10/22/2021 44.7  40.0 - 53.0 % Final     MCV 10/22/2021 81  78 - 100 fL Final     MCH 10/22/2021 26.6  26.5 - 33.0 pg Final     MCHC 10/22/2021 32.9  31.5 - 36.5 g/dL Final     RDW 10/22/2021 13.3  10.0 - 15.0 % Final     Platelet Count 10/22/2021 319  150 - 450 10e3/uL Final     Sodium 10/22/2021 141  133 - 144 mmol/L Final     Potassium 10/22/2021 4.1  3.4 - 5.3 mmol/L Final     Chloride 10/22/2021 108  94 - 109 mmol/L Final     Carbon Dioxide (CO2) 10/22/2021 29  20 - 32 mmol/L Final     Anion Gap 10/22/2021 4  3 - 14 mmol/L Final     Urea Nitrogen 10/22/2021 13  7 - 30 mg/dL Final     Creatinine 10/22/2021 0.72  0.66 - 1.25 mg/dL Final     Calcium 10/22/2021 9.3  8.5 - 10.1 mg/dL Final     Glucose 10/22/2021 89  70 - 99 mg/dL Final     Alkaline Phosphatase 10/22/2021 82  40 - 150 U/L Final     AST 10/22/2021 34  0 - 45 U/L Final     ALT 10/22/2021 83 (A) 0 - 70 U/L Final     Protein Total 10/22/2021 7.8  6.8 - 8.8 g/dL Final     Albumin 10/22/2021 3.8  3.4 - 5.0 g/dL Final     Bilirubin Total 10/22/2021 0.3  0.2 - 1.3 mg/dL Final     GFR Estimate 10/22/2021 >90  >60 mL/min/1.73m2 Final    As of July 11, 2021, eGFR is calculated by the CKD-EPI creatinine equation, without race adjustment. eGFR can be influenced by muscle mass, exercise, and diet. The reported eGFR is an estimation only and is only applicable if the renal function is stable.     Hemoglobin A1C 10/22/2021 5.7 (A) 0.0 - 5.6 % Final    Normal <5.7%  "  Prediabetes 5.7-6.4%    Diabetes 6.5% or higher     Note: Adopted from ADA consensus guidelines.     Vitamin D, Total (25-Hydroxy) 10/22/2021 12 (A) 20 - 75 ug/L Final     Parathyroid Hormone Intact 10/22/2021 25  18 - 80 pg/mL Final     Cholesterol 10/22/2021 221 (A) <200 mg/dL Final     Triglycerides 10/22/2021 130  <150 mg/dL Final     Direct Measure HDL 10/22/2021 51  >=40 mg/dL Final     LDL Cholesterol Calculated 10/22/2021 144 (A) <=100 mg/dL Final     Non HDL Cholesterol 10/22/2021 170 (A) <130 mg/dL Final     Patient Fasting > 8hrs? 10/22/2021 Yes   Final     TSH 10/22/2021 1.39  0.40 - 4.00 mU/L Final       PHYSICAL EXAM:  Objective    Ht 1.702 m (5' 7\")   BMI 48.02 kg/m             Vitals:  No vitals were obtained today due to virtual visit.    GENERAL: Healthy, alert and no distress  EYES: Eyes grossly normal to inspection.  No discharge or erythema, or obvious scleral/conjunctival abnormalities.  RESP: No audible wheeze, cough, or visible cyanosis.  No visible retractions or increased work of breathing.    SKIN: Visible skin clear. No significant rash, abnormal pigmentation or lesions.  NEURO: Cranial nerves grossly intact.  Mentation and speech appropriate for age.  PSYCH: Mentation appears normal, affect normal/bright, judgement and insight intact, normal speech and appearance well-groomed.        Sincerely,    Buffy Forbes PA-C              "

## 2022-04-07 ENCOUNTER — VIRTUAL VISIT (OUTPATIENT)
Dept: ENDOCRINOLOGY | Facility: CLINIC | Age: 32
End: 2022-04-07
Payer: COMMERCIAL

## 2022-04-07 ENCOUNTER — TELEPHONE (OUTPATIENT)
Dept: GASTROENTEROLOGY | Facility: CLINIC | Age: 32
End: 2022-04-07

## 2022-04-07 VITALS — HEIGHT: 67 IN | BODY MASS INDEX: 48.02 KG/M2

## 2022-04-07 DIAGNOSIS — E66.01 CLASS 3 SEVERE OBESITY DUE TO EXCESS CALORIES WITH SERIOUS COMORBIDITY AND BODY MASS INDEX (BMI) OF 45.0 TO 49.9 IN ADULT (H): ICD-10-CM

## 2022-04-07 DIAGNOSIS — R21 RASH: ICD-10-CM

## 2022-04-07 DIAGNOSIS — Z98.84 HISTORY OF REMOVAL OF LAPAROSCOPIC GASTRIC BANDING DEVICE: Primary | ICD-10-CM

## 2022-04-07 DIAGNOSIS — E66.813 CLASS 3 SEVERE OBESITY DUE TO EXCESS CALORIES WITH SERIOUS COMORBIDITY AND BODY MASS INDEX (BMI) OF 45.0 TO 49.9 IN ADULT (H): ICD-10-CM

## 2022-04-07 PROCEDURE — 99214 OFFICE O/P EST MOD 30 MIN: CPT | Mod: 95 | Performed by: PHYSICIAN ASSISTANT

## 2022-04-07 RX ORDER — TOPIRAMATE 25 MG/1
TABLET, FILM COATED ORAL
Qty: 90 TABLET | Refills: 1 | Status: SHIPPED | OUTPATIENT
Start: 2022-04-07 | End: 2022-12-08

## 2022-04-07 RX ORDER — PHENTERMINE HYDROCHLORIDE 15 MG/1
15 CAPSULE ORAL EVERY MORNING
Qty: 30 CAPSULE | Refills: 1 | Status: SHIPPED | OUTPATIENT
Start: 2022-04-07 | End: 2022-05-10

## 2022-04-07 NOTE — PATIENT INSTRUCTIONS
PLAN:  After EGD 4/13/22 if plan for sleeve call [facility] to schedule psych eval ASAP. [phone]  Retrial phentermine/topiramate, tolerated in the past with PCP  Restart saxenda 0.6mg daily and stay on this low dose for now (tolerated 0.6mg dose)  Need to lose to 285 lbs. May need to do preop liquid diet if struggling to lose the last 5-10 lbs prior to surgery  Need to check weight at home, send via [employer] message  Follow up RD monthly.  Follow up 1 month MTM Lauren Bloch phone  Follow up Buffy Forbes return MWM 3 months    MEDICATION STARTED AT THIS APPOINTMENT    We are starting Phentermine. Take one tablet in the morning. Contact the nurse via [employer] or call [phone] if you have any questions or concerns. (Do not stop taking it if you don't think it's working. For some people it works without them knowing it.)    Phentermine is being prescribed because you identified hunger as one of the main causes for your extra weight.      Our patients on Phentermine find that they:    >feel less hunger    >find it easier to push the plate away   >have an easier time eating less    For some of our patients, these feelings are very real and immediate. For other patients, the feelings are less obvious. They don't feel much of a change but find they've lost weight. Like all weight loss medications, Phentermine  works best when you help it work. This means:  1. Having less tempting high calorie (fattening) food around the house or office. (For people with strong cravings this is very important.)   2. Staying away from situations or people that may trigger your cravings .   3. Eating out only one time or less each week.  4. Eating your meals at a table with the TV or computer off.    Side-effects. Phentermine is generally well tolerated. The main side-effects we see are feelings of racing pulse or rapid heart beat. Some people can get an elevated blood pressure. Because of this we may have you come back within a week or so  of starting the medication for a blood pressure check.         In order to get refills of this or any medication we prescribe you must be seen in the medical weight mgmt clinic every 2-3 months.      MEDICATION STARTED AT THIS APPOINTMENT  We are starting topiramate at bedtime.  Start one tab, 25 mg, for a week. Go up to 50 mg (2 tabs) for the next week. At the third week, take   3 tabs (75 mg).  Stay at 3 tabs until you are seen again. Contact the nurse via [employer] or call [phone] if you have any questions or concerns. (Do not stop taking it if you don't think it's working. For some people it works even though they do not feel much different.)    Topiramate (Topamax) is a medication that is used most often to treat migraine headaches or for seizures. It has also been found to help with weight loss. Although it's not currently FDA approved for weight loss, it has been used safely for a number of years to help people who are carrying extra weight.     Just how topiramate helps with weight loss has not been exactly determined. However it seems to work on areas of the brain to quiet down signals related to eating.      Topiramate may make you:    >feel less interest in eating in between meals   >think less about food and eating   >find it easier to push the plate away   >find giving up pop easier    >have an easier time eating less    For some of our patients, the pills work right away. They feel and think quite differently about food. Other patients don't feel much of a change but find in fact they have lost weight! Like all weight loss medications, topiramate works best when you help it work.  This means:    1) Have less tempting high calorie (fattening) food around the house or office    2) Have lower calorie food (fruits, vegetables,low fat meats and dairy) for snacks    3) Eat out only one time or less each week.   4) Eat your meals at a table with the TV or computer off.    Side-effects. Topiramate is  generally well tolerated. The main side-effects we see are:   Tingling in hands,feet, or face (usually not very troublesome)   Mental confusion and word finding trouble (about 10% of patients have this.)     Feeling sleepy or a bit dopey- this goes away very soon after starting.    One of the dangers of topiramate is the possibility of birth defects--if you get pregnant when you are on it, there is the risk that your baby will be born with a cleft lip or palate.  If you are on topiramate and of child bearing age, you need to be on a reliable form of birth control or refrain from sexual intercourse.     Please refer to the pharmacy insert for more information on side-effects. Since many pharmacists are not familiar with the use of topiramate in weight loss, calling the clinic will get you the most accurate information on the use of this medication for weight loss.     In order to get refills of this or any medication we prescribe you must be seen in the medical weight mgmt clinic every 2-3 months.

## 2022-04-07 NOTE — NURSING NOTE
Chief Complaint   Patient presents with     Follow Up     Return medical weight management.       Vitals:       There is no height or weight on file to calculate BMI.      Prashant Henry, EMT  [facility]

## 2022-04-07 NOTE — Clinical Note
Follow up RD monthly.  Follow up 1 month MTM Lauren Bloch phone  Follow up Buffy Forbes return MWM 3 months

## 2022-04-07 NOTE — LETTER
2022       RE: Alcides Beauchamp  [address]  [facility] [address]     Dear Colleague,    Thank you for referring your patient, Alcides Beauchamp, to the [facility] [place] at [facility]. Please see a copy of my visit note below.    During this virtual visit the patient is located in [place], patient verifies this as the location during the entirety of this visit.     Alcides is a 31 year old who is being evaluated via a billable video visit.      How would you like to obtain your AVS? MyChart  If the video visit is dropped, the invitation should be resent by: Text to cell phone:  [phone]  Will anyone else be joining your video visit? No    Video Start Time: 8:56 AM    Video-Visit Details    Type of service:  Video Visit    Video End Time:9:21 AM    Originating Location (pt. Location): Home    Distant Location (provider location):  [facility] [place]     Platform used for Video Visit: Satish Henry NREMT      30 minutes spent on the date of the encounter doing chart review, history and exam, documentation and further activities per the note    Return Medical Weight Management Note     Alcides Beauchamp  MRN:  [id number]  :  1990  REY:  2022    Dear Sanjay Farrell MD,    I had the pleasure of seeing your patient Alcides Beauchamp. He is a 31 year old male who I am continuing to see for treatment of obesity related to:       10/21/2021   I have the following health issues associated with obesity: Heart Disease, High Blood Pressure       Assessment & Plan   Problem List Items Addressed This Visit        Endocrine Diagnoses    Class 3 severe obesity due to excess calories with serious comorbidity and body mass index (BMI) of 45.0 to 49.9 in adult (H)       Other    History of removal of laparoscopic gastric banding device - Primary    Rash    Relevant Orders    Adult Dermatology Referral          INTERVAL HISTORY:  MWM follow up  Hx of lap band and band removed. Wants sleeve or RYGB  EGD with GI in the past that showed HH but CT did not show HH.    Doesn't know current weight, hasn't checked recently.  3 RD visits done  Goal weight to lose to 285 lbs preop  [place] Sax[place] and side effect of drowsiness/fatigue/body aches.  He was on it for 2 weeks. Reports sensitivity to medications in general. Did fine on Saxenda 0.6mg daily.  EGD 4/13/22 with Dr Sanchez to evaluate for HH  Has tried phentermine and topiramate in the past and wants to retrial.  No CAD or HTN.  No kidney stones hx.     PLAN:  After EGD 4/13/22 if plan for sleeve call [facility] to schedule psych eval ASAP. [phone]  Retrial phentermine/topiramate, tolerated in the past with PCP  Restart saxenda 0.6mg daily and stay on this low dose for now (tolerated 0.6mg dose)  Need to lose to 285 lbs. May need to do preop liquid diet if struggling to lose the last 5-10 lbs prior to surgery  Need to check weight at home, send via [employer] message  Follow up RD monthly.  Follow up 1 month [facility] Lauren Bloch phone  Follow up Buffy Forbes return MWM 3 months       CURRENT WEIGHT:   0 lbs 0 oz    Initial Weight (lbs): 320 lbs  Last Visits Weight: 139.1 kg (306 lb 9.6 oz)          Changes and Difficulties 4/7/2022   I have made the following changes to my diet since my last visit: Cutting carbs.   With regards to my diet, I am still struggling with: Loosing weight.   I have made the following changes to my activity/exercise since my last visit: No changes.   With regards to my activity/exercise, I am still struggling with: Loosing.         MEDICATIONS:   Current Outpatient Medications   Medication Sig Dispense Refill     DULoxetine (CYMBALTA) 30 MG capsule Take 1 capsule (30 mg) by mouth daily       Semaglutide-Weight Management (WEGOVY) 0.25 MG/0.5ML SOAJ Inject 0.25 mg Subcutaneous every 7 days For month 1: 0.25mg weekly x 4  weeks; For month 2: 0.5mg weekly x 4 weeks; Then Buffy will re-evaluate for further increases. 3 mL 0       Weight Loss Medication History Reviewed With Patient 4/7/2022   Which weight loss medications are you currently taking on a regular basis?  None   If you are not taking a weight loss medication that was prescribed to you, please indicate why: I did not like the side effects   Are you having any side effects from the weight loss medication that we have prescribed you? Yes   If you are having side effects please describe: Oversleeping, body aches, energy loss.       Lab on 10/22/2021   Component Date Value Ref Range Status     WBC Count 10/22/2021 7.4  4.0 - 11.0 10e3/uL Final     RBC Count 10/22/2021 5.52  4.40 - 5.90 10e6/uL Final     Hemoglobin 10/22/2021 14.7  13.3 - 17.7 g/dL Final     Hematocrit 10/22/2021 44.7  40.0 - 53.0 % Final     MCV 10/22/2021 81  78 - 100 fL Final     MCH 10/22/2021 26.6  26.5 - 33.0 pg Final     MCHC 10/22/2021 32.9  31.5 - 36.5 g/dL Final     RDW 10/22/2021 13.3  10.0 - 15.0 % Final     Platelet Count 10/22/2021 319  150 - 450 10e3/uL Final     Sodium 10/22/2021 141  133 - 144 mmol/L Final     Potassium 10/22/2021 4.1  3.4 - 5.3 mmol/L Final     Chloride 10/22/2021 108  94 - 109 mmol/L Final     Carbon Dioxide (CO2) 10/22/2021 29  20 - 32 mmol/L Final     Anion Gap 10/22/2021 4  3 - 14 mmol/L Final     Urea Nitrogen 10/22/2021 13  7 - 30 mg/dL Final     Creatinine 10/22/2021 0.72  0.66 - 1.25 mg/dL Final     Calcium 10/22/2021 9.3  8.5 - 10.1 mg/dL Final     Glucose 10/22/2021 89  70 - 99 mg/dL Final     Alkaline Phosphatase 10/22/2021 82  40 - 150 U/L Final     AST 10/22/2021 34  0 - 45 U/L Final     ALT 10/22/2021 83 (A) 0 - 70 U/L Final     Protein Total 10/22/2021 7.8  6.8 - 8.8 g/dL Final     Albumin 10/22/2021 3.8  3.4 - 5.0 g/dL Final     Bilirubin Total 10/22/2021 0.3  0.2 - 1.3 mg/dL Final     GFR Estimate 10/22/2021 >90  >60 mL/min/1.73m2 Final    As of July 11, 2021,  "eGFR is calculated by the CKD-EPI creatinine equation, without race adjustment. eGFR can be influenced by muscle mass, exercise, and diet. The reported eGFR is an estimation only and is only applicable if the renal function is stable.     Hemoglobin A1C 10/22/2021 5.7 (A) 0.0 - 5.6 % Final    Normal <5.7%   Prediabetes 5.7-6.4%    Diabetes 6.5% or higher     Note: Adopted from ADA consensus guidelines.     Vitamin D, Total (25-Hydroxy) 10/22/2021 12 (A) 20 - 75 ug/L Final     Parathyroid Hormone Intact 10/22/2021 25  18 - 80 pg/mL Final     Cholesterol 10/22/2021 221 (A) <200 mg/dL Final     Triglycerides 10/22/2021 130  <150 mg/dL Final     Direct Measure HDL 10/22/2021 51  >=40 mg/dL Final     LDL Cholesterol Calculated 10/22/2021 144 (A) <=100 mg/dL Final     Non HDL Cholesterol 10/22/2021 170 (A) <130 mg/dL Final     Patient Fasting > 8hrs? 10/22/2021 Yes   Final     TSH 10/22/2021 1.39  0.40 - 4.00 mU/L Final       PHYSICAL EXAM:  Objective    Ht 1.702 m (5' 7\")   BMI 48.02 kg/m         Vitals:  No vitals were obtained today due to virtual visit.    GENERAL: Healthy, alert and no distress  EYES: Eyes grossly normal to inspection.  No discharge or erythema, or obvious scleral/conjunctival abnormalities.  RESP: No audible wheeze, cough, or visible cyanosis.  No visible retractions or increased work of breathing.    SKIN: Visible skin clear. No significant rash, abnormal pigmentation or lesions.  NEURO: Cranial nerves grossly intact.  Mentation and speech appropriate for age.  PSYCH: Mentation appears normal, affect normal/bright, judgement and insight intact, normal speech and appearance well-groomed.        Sincerely,    Buffy Forbes PA-C      "

## 2022-04-07 NOTE — TELEPHONE ENCOUNTER
Patient scheduled for EGD on 4/13/22.     Covid test scheduled: 4/9/22    Arrival time: 1300    Facility location: [facility]     Sedation type: CS    Indication for procedure: hiatal hernia    Anticoagulations? no     Pre visit planning completed.    Val Mclaughlin RN

## 2022-04-08 NOTE — TELEPHONE ENCOUNTER
Pre assessment questions completed for upcoming EGD procedure scheduled on 4/13/22    COVID test scheduled 4/9/22    Reviewed procedural arrival time 1300 and facility location [facility].    Designated  policy reviewed. Instructed to have someone stay 6 hours post procedure.     Reviewed EGD prep instructions with patient. NPO six hours prior to procedure.     Patient verbalized understanding and had no questions or concerns at this time.    Val Mclaughlin RN

## 2022-04-09 ENCOUNTER — LAB (OUTPATIENT)
Dept: LAB | Facility: CLINIC | Age: 32
End: 2022-04-09
Payer: COMMERCIAL

## 2022-04-09 DIAGNOSIS — Z20.822 ENCOUNTER FOR LABORATORY TESTING FOR COVID-19 VIRUS: ICD-10-CM

## 2022-04-09 PROCEDURE — U0003 INFECTIOUS AGENT DETECTION BY NUCLEIC ACID (DNA OR RNA); SEVERE ACUTE RESPIRATORY SYNDROME CORONAVIRUS 2 (SARS-COV-2) (CORONAVIRUS DISEASE [COVID-19]), AMPLIFIED PROBE TECHNIQUE, MAKING USE OF HIGH THROUGHPUT TECHNOLOGIES AS DESCRIBED BY CMS-2020-01-R: HCPCS | Mod: 90 | Performed by: PATHOLOGY

## 2022-04-09 PROCEDURE — 99000 SPECIMEN HANDLING OFFICE-LAB: CPT | Performed by: PATHOLOGY

## 2022-04-09 PROCEDURE — U0005 INFEC AGEN DETEC AMPLI PROBE: HCPCS | Mod: 90 | Performed by: PATHOLOGY

## 2022-04-10 LAB — SARS-COV-2 RNA RESP QL NAA+PROBE: NEGATIVE

## 2022-04-13 ENCOUNTER — HOSPITAL ENCOUNTER (OUTPATIENT)
Facility: CLINIC | Age: 32
Discharge: HOME | End: 2022-04-13
Attending: SURGERY | Admitting: SURGERY
Payer: COMMERCIAL

## 2022-04-13 VITALS
HEIGHT: 67 IN | DIASTOLIC BLOOD PRESSURE: 64 MMHG | RESPIRATION RATE: 16 BRPM | WEIGHT: 308.2 LBS | OXYGEN SATURATION: 97 % | HEART RATE: 69 BPM | BODY MASS INDEX: 48.37 KG/M2 | SYSTOLIC BLOOD PRESSURE: 108 MMHG | TEMPERATURE: 98.7 F

## 2022-04-13 LAB — UPPER GI ENDOSCOPY: NORMAL

## 2022-04-13 PROCEDURE — G0500 MOD SEDAT ENDO SERVICE >5YRS: HCPCS | Performed by: SURGERY

## 2022-04-13 PROCEDURE — 999N000099 HC STATISTIC MODERATE SEDATION < 10 MIN: Performed by: SURGERY

## 2022-04-13 PROCEDURE — 250N000011 HC RX IP 250 OP 636: Performed by: SURGERY

## 2022-04-13 PROCEDURE — 43235 EGD DIAGNOSTIC BRUSH WASH: CPT | Performed by: SURGERY

## 2022-04-13 RX ORDER — FENTANYL CITRATE 50 UG/ML
INJECTION, SOLUTION INTRAMUSCULAR; INTRAVENOUS PRN
Status: COMPLETED | OUTPATIENT
Start: 2022-04-13 | End: 2022-04-13

## 2022-04-13 RX ORDER — ACETAMINOPHEN 325 MG/1
650 TABLET ORAL
Status: DISCONTINUED | OUTPATIENT
Start: 2022-04-13 | End: 2022-04-13 | Stop reason: HOSPADM

## 2022-04-13 RX ORDER — ONDANSETRON 4 MG/1
4 TABLET, ORALLY DISINTEGRATING ORAL EVERY 6 HOURS PRN
Status: CANCELLED | OUTPATIENT
Start: 2022-04-13

## 2022-04-13 RX ORDER — NALOXONE HYDROCHLORIDE 0.4 MG/ML
0.2 INJECTION, SOLUTION INTRAMUSCULAR; INTRAVENOUS; SUBCUTANEOUS
Status: CANCELLED | OUTPATIENT
Start: 2022-04-13

## 2022-04-13 RX ORDER — HYDROXYZINE HYDROCHLORIDE 25 MG/1
25 TABLET, FILM COATED ORAL EVERY 4 HOURS PRN
COMMUNITY
Start: 2021-11-11 | End: 2023-01-09

## 2022-04-13 RX ORDER — ONDANSETRON 4 MG/1
4 TABLET, ORALLY DISINTEGRATING ORAL
Status: DISCONTINUED | OUTPATIENT
Start: 2022-04-13 | End: 2022-04-13 | Stop reason: HOSPADM

## 2022-04-13 RX ORDER — ERGOCALCIFEROL 1.25 MG/1
CAPSULE ORAL
COMMUNITY
Start: 2021-10-29 | End: 2022-05-10

## 2022-04-13 RX ORDER — IBUPROFEN 200 MG
400-600 TABLET ORAL EVERY 6 HOURS PRN
COMMUNITY
Start: 2021-03-24 | End: 2023-01-09

## 2022-04-13 RX ORDER — PROCHLORPERAZINE MALEATE 10 MG
10 TABLET ORAL EVERY 6 HOURS PRN
Status: CANCELLED | OUTPATIENT
Start: 2022-04-13

## 2022-04-13 RX ORDER — NALOXONE HYDROCHLORIDE 0.4 MG/ML
0.4 INJECTION, SOLUTION INTRAMUSCULAR; INTRAVENOUS; SUBCUTANEOUS
Status: CANCELLED | OUTPATIENT
Start: 2022-04-13

## 2022-04-13 RX ORDER — ONDANSETRON 2 MG/ML
4 INJECTION INTRAMUSCULAR; INTRAVENOUS EVERY 6 HOURS PRN
Status: CANCELLED | OUTPATIENT
Start: 2022-04-13

## 2022-04-13 RX ORDER — FLUMAZENIL 0.1 MG/ML
0.2 INJECTION, SOLUTION INTRAVENOUS
Status: CANCELLED | OUTPATIENT
Start: 2022-04-13 | End: 2022-04-14

## 2022-04-13 RX ORDER — BUPROPION HYDROCHLORIDE 300 MG/1
300 TABLET ORAL
COMMUNITY
Start: 2021-05-18 | End: 2022-05-10

## 2022-04-13 RX ADMIN — MIDAZOLAM 1 MG: 1 INJECTION INTRAMUSCULAR; INTRAVENOUS at 14:39

## 2022-04-13 RX ADMIN — MIDAZOLAM 1 MG: 1 INJECTION INTRAMUSCULAR; INTRAVENOUS at 14:35

## 2022-04-13 RX ADMIN — MIDAZOLAM 1 MG: 1 INJECTION INTRAMUSCULAR; INTRAVENOUS at 14:37

## 2022-04-13 RX ADMIN — FENTANYL CITRATE 50 MCG: 50 INJECTION, SOLUTION INTRAMUSCULAR; INTRAVENOUS at 14:35

## 2022-04-13 NOTE — H&P
"H&P  31 year old male with a history of lap band which was removed due to mechanical failure of the band.  In good health  Here for preoperative H&P for an endoscopy  No recent cold or flu  No past medical history on file.  Past Surgical History:   Procedure Laterality Date     ESOPHAGOSCOPY, GASTROSCOPY, DUODENOSCOPY (EGD), COMBINED N/A 10/07/2021    Procedure: ESOPHAGOGASTRODUODENOSCOPY (EGD);  Surgeon: Liss Durham MD;  Location: [facility]     LAP ADJUSTABLE GASTRIC BAND  2009    in [place]     LAPAROSCOPIC REMOVAL GASTRIC ADJUSTABLE BAND  2020    in [place]     No current facility-administered medications for this encounter.    No Known Allergies  /70 (BP Location: Right arm)   Pulse 77   Temp 98.7  F (37.1  C) (Oral)   Resp 16   Ht 1.702 m (5' 7\")   Wt 139.8 kg (308 lb 3.3 oz)   SpO2 97%   BMI 48.27 kg/m    Awake and alert   Lungs clear to auscultation  Cor RRR    AP: EGD for planning OR.  "

## 2022-04-13 NOTE — OR NURSING
Procedure: EGD no interventions  Sedation: conscious sedation  Specimens: none.   O2: 3L/NC  Tolerated procedure: well  Pt to recovery area in stable condition accompanied by RN.   Other:  none    Brigida Grant RN

## 2022-04-19 NOTE — PROGRESS NOTES
[facility] Dermatology Note  Encounter Date: Apr 20, 2022  Office Visit     Dermatology Problem List:  1. Scabies, clinically diagnosed.  - Ivermectin  ____________________________________________    Assessment & Plan:    # Scabies, clinically diagnosed, new diagnosis.  Discussed his wife will need treatment as well and we will set up an appt, will discuss recs for his 1-month old twins with peds derm team.  - Start ivermectin 24 mg once weekly for 2 weeks  - 200 mcg/kg dosing in 129 kg pt = 25.8 mg  - Mineral oil prep negative but advised patient does not exclude scabies.  - Will set up follow-up visit with his wife; he will notify me if not improving    Procedures Performed:   None    Follow-up: prn for new or changing lesions    Staff and Scribe:     Scribe Disclosure:  I, Rohith Nesbitt, am serving as a scribe to document services personally performed by Maria Antonia Mckenzie MD based on data collection and the provider's statements to me.     Provider Disclosure:   The documentation recorded by the scribe accurately reflects the services I personally performed and the decisions made by me.    Maria Antonia Mckenzie MD    Department of Dermatology  [facility]: Phone: [phone], Fax: [phone]  4/20/2022     ____________________________________________    CC: Derm Problem (Rash over the whole body, has had for about 6 months )    HPI:  Mr. Alcides Beauchamp is a(n) 31 year old male who presents today as a new patient for rash. Patient reports that 6 months ago, he started experiencing an itchy rash on his ankles, arms, hands, buttocks, groin. He has tried antihistamines and frequent moisturization. His wife has a similar rash primarily on the legs. They have one month old twins, one has a diaper rash.    Patient is otherwise feeling well, without additional skin concerns.    Labs Reviewed:  N/A    Physical  Exam:  Vitals: There were no vitals taken for this visit.  SKIN: Focused examination of wrists, abdomen, groin, buttocks, ankles, hands was performed.  - Scattered slightly pink papules, volar wrists, abdomen, inguinal folds, buttocks, dorsal ankles.  - Mild scaling between finger web spaces.  - No other lesions of concern on areas examined.     Medications:  Current Outpatient Medications   Medication     buPROPion (WELLBUTRIN XL) 300 MG 24 hr tablet     DULoxetine (CYMBALTA) 30 MG capsule     ergocalciferol (ERGOCALCIFEROL) 1.25 MG (11119 UT) capsule     hydrOXYzine (ATARAX) 25 MG tablet     ibuprofen (ADVIL/MOTRIN) 800 MG tablet     phentermine (ADIPEX-P) 15 MG capsule     Semaglutide-Weight Management (WEGOVY) 0.25 MG/0.5ML SOAJ     topiramate (TOPAMAX) 25 MG tablet     No current facility-administered medications for this visit.      Past Medical History:   Patient Active Problem List   Diagnosis     Class 3 severe obesity due to excess calories with serious comorbidity and body mass index (BMI) of 45.0 to 49.9 in adult (H)     History of removal of laparoscopic gastric banding device     Rash     No past medical history on file.     CC Buffy Forbes PA-C  [address] [facility] [address]  [place] [address] on close of this encounter.

## 2022-04-20 ENCOUNTER — MYC MEDICAL ADVICE (OUTPATIENT)
Dept: DERMATOLOGY | Facility: CLINIC | Age: 32
End: 2022-04-20

## 2022-04-20 ENCOUNTER — OFFICE VISIT (OUTPATIENT)
Dept: DERMATOLOGY | Facility: CLINIC | Age: 32
End: 2022-04-20
Attending: PHYSICIAN ASSISTANT
Payer: COMMERCIAL

## 2022-04-20 DIAGNOSIS — B86 SCABIES: ICD-10-CM

## 2022-04-20 PROCEDURE — 99203 OFFICE O/P NEW LOW 30 MIN: CPT | Performed by: DERMATOLOGY

## 2022-04-20 RX ORDER — IVERMECTIN 3 MG/1
24 TABLET ORAL WEEKLY
Qty: 16 TABLET | Refills: 0 | Status: SHIPPED | OUTPATIENT
Start: 2022-04-20 | End: 2022-04-28

## 2022-04-20 ASSESSMENT — PAIN SCALES - GENERAL: PAINLEVEL: NO PAIN (0)

## 2022-04-20 NOTE — NURSING NOTE
Dermatology Rooming Note    Alcides Beauchamp's goals for this visit include:   Chief Complaint   Patient presents with     Derm Problem     Rash over the whole body, has had for about 6 months      Iona Molina CMA on 4/20/2022 at 9:35 AM

## 2022-04-20 NOTE — LETTER
4/20/2022       RE: Alcides Beauchamp  [address]  [facility] [address]     Dear Colleague,    Thank you for referring your patient, Alcides Beauchamp, to the [facility] DERMATOLOGY CLIN[facility] [place] at [facility]. Please see a copy of my visit note below.    [facility] Dermatology Note  Encounter Date: Apr 20, 2022  Office Visit     Dermatology Problem List:  1. Scabies, clinically diagnosed.  - Ivermectin  ____________________________________________    Assessment & Plan:    # Scabies, clinically diagnosed, new diagnosis.  Discussed his wife will need treatment as well and we will set up an appt, will discuss recs for his 1-month old twins with peds derm team.  - Start ivermectin 24 mg once weekly for 2 weeks  - 200 mcg/kg dosing in 129 kg pt = 25.8 mg  - Mineral oil prep negative but advised patient does not exclude scabies.  - Will set up follow-up visit with his wife; he will notify me if not improving    Procedures Performed:   None    Follow-up: prn for new or changing lesions    Staff and Scribe:     Scribe Disclosure:  I, Rohith Nesbitt, am serving as a scribe to document services personally performed by Maria Antonia Mckenzie MD based on data collection and the provider's statements to me.     Provider Disclosure:   The documentation recorded by the scribe accurately reflects the services I personally performed and the decisions made by me.    Maria Antonia Mckenzie MD    Department of Dermatology  [facility]: Phone: [phone], Fax: [phone]  4/20/2022     ____________________________________________    CC: Derm Problem (Rash over the whole body, has had for about 6 months )    HPI:  Mr. Alcides Beauchamp is a(n) 31 year old male who presents today as a new patient for rash. Patient reports that 6 months ago, he started experiencing an itchy rash on his  ankles, arms, hands, buttocks, groin. He has tried antihistamines and frequent moisturization. His wife has a similar rash primarily on the legs. They have one month old twins, one has a diaper rash.    Patient is otherwise feeling well, without additional skin concerns.    Labs Reviewed:  N/A    Physical Exam:  Vitals: There were no vitals taken for this visit.  SKIN: Focused examination of wrists, abdomen, groin, buttocks, ankles, hands was performed.  - Scattered slightly pink papules, volar wrists, abdomen, inguinal folds, buttocks, dorsal ankles.  - Mild scaling between finger web spaces.  - No other lesions of concern on areas examined.     Medications:  Current Outpatient Medications   Medication     buPROPion (WELLBUTRIN XL) 300 MG 24 hr tablet     DULoxetine (CYMBALTA) 30 MG capsule     ergocalciferol (ERGOCALCIFEROL) 1.25 MG (58378 UT) capsule     hydrOXYzine (ATARAX) 25 MG tablet     ibuprofen (ADVIL/MOTRIN) 800 MG tablet     phentermine (ADIPEX-P) 15 MG capsule     Semaglutide-Weight Management (WEGOVY) 0.25 MG/0.5ML SOAJ     topiramate (TOPAMAX) 25 MG tablet     No current facility-administered medications for this visit.      Past Medical History:   Patient Active Problem List   Diagnosis     Class 3 severe obesity due to excess calories with serious comorbidity and body mass index (BMI) of 45.0 to 49.9 in adult (H)     History of removal of laparoscopic gastric banding device     Rash     No past medical history on file.     CC Buffy Forbes PA-C  [address] [facility] [address]  [place] [address] on close of this encounter.

## 2022-04-27 ENCOUNTER — OFFICE VISIT (OUTPATIENT)
Dept: SURGERY | Facility: CLINIC | Age: 32
End: 2022-04-27
Payer: COMMERCIAL

## 2022-04-27 VITALS
OXYGEN SATURATION: 98 % | BODY MASS INDEX: 48.33 KG/M2 | DIASTOLIC BLOOD PRESSURE: 84 MMHG | HEART RATE: 83 BPM | HEIGHT: 67 IN | WEIGHT: 307.9 LBS | SYSTOLIC BLOOD PRESSURE: 132 MMHG

## 2022-04-27 DIAGNOSIS — E66.01 MORBID OBESITY (H): Primary | ICD-10-CM

## 2022-04-27 PROCEDURE — 99212 OFFICE O/P EST SF 10 MIN: CPT | Performed by: SURGERY

## 2022-04-27 ASSESSMENT — PAIN SCALES - GENERAL: PAINLEVEL: NO PAIN (0)

## 2022-04-27 NOTE — LETTER
4/27/2022       RE: Alcides Beauchamp  [address]  [facility] [address]     Dear Colleague,    Thank you for referring your patient, Alcides Beauchamp, to the [facility] GENERAL SURGERY CLINIC [place] at [facility]. Please see a copy of my visit note below.      Patient here for follow-up visit following his upper endoscopy.  Patient has a known history of a Lap-Band placement with removal of his Lap-Band due to the fact that it was too tight and they were unable to remove the fluid.  The patient demonstrated some deformity at the GE junction related to his Lap-Band.  Today we discussed either the revisional procedure of a sleeve gastrectomy.  We discussed that there will was an increased risk of leakage given the fact that he had revisional surgery.  We also talked about the fact that there might be sufficient adhesions that we may need to perform a Homero-en-Y gastric bypass and stapled below the gastric pouch created by the previous band site.  For this reason I will include Dr. Moreno my associate as we schedule this procedure given the technical complexity.  He understands this plan the increased risk and would be eager to proceed importantly he must lose 20 pounds from today's weight.  He is at 307 pounds and he should not be scheduled until he weighs 287 pounds.  Hopefully we can accomplish this after he returns from his trip to [place] in late July.        Sincerely,    Kameron Sanchez MD

## 2022-04-27 NOTE — PROGRESS NOTES
Patient here for follow-up visit following his upper endoscopy.  Patient has a known history of a Lap-Band placement with removal of his Lap-Band due to the fact that it was too tight and they were unable to remove the fluid.  The patient demonstrated some deformity at the GE junction related to his Lap-Band.  Today we discussed either the revisional procedure of a sleeve gastrectomy.  We discussed that there will was an increased risk of leakage given the fact that he had revisional surgery.  We also talked about the fact that there might be sufficient adhesions that we may need to perform a Homero-en-Y gastric bypass and stapled below the gastric pouch created by the previous band site.  For this reason I will include Dr. Moreno my associate as we schedule this procedure given the technical complexity.  He understands this plan the increased risk and would be eager to proceed importantly he must lose 20 pounds from today's weight.  He is at 307 pounds and he should not be scheduled until he weighs 287 pounds.  Hopefully we can accomplish this after he returns from his trip to [place] in late July.

## 2022-04-27 NOTE — NURSING NOTE
"(   Chief Complaint   Patient presents with     RECHECK     Follow up after EGD     )    ( Weight: 139.7 kg (307 lb 14.4 oz) )  ( Height: 170.2 cm (5' 7\") )  ( BMI (Calculated): 48.22 )  (   )  (   )  (   )  (   )  (   )  (   )    ( BP: 132/84 )  (   )  (   )  (   )  ( Pulse: 83 )  (   )  ( SpO2: 98 % )    (   Patient Active Problem List   Diagnosis     Class 3 severe obesity due to excess calories with serious comorbidity and body mass index (BMI) of 45.0 to 49.9 in adult (H)     History of removal of laparoscopic gastric banding device     Rash    )  (   Current Outpatient Medications   Medication Sig Dispense Refill     hydrOXYzine (ATARAX) 25 MG tablet Take 25 mg by mouth       ibuprofen (ADVIL/MOTRIN) 800 MG tablet Take 800 mg by mouth       ivermectin (STROMECTOL) 3 MG TABS tablet Take 8 tablets (24 mg) by mouth once a week for 2 doses 16 tablet 0     topiramate (TOPAMAX) 25 MG tablet 25mg at bedtime for week 1, 50mg at bedtime for 1 week, and 75mg at bedtime thereafter 90 tablet 1     UNABLE TO FIND MEDICATION NAME: Saxenda       buPROPion (WELLBUTRIN XL) 300 MG 24 hr tablet Take 300 mg by mouth (Patient not taking: No sig reported)       DULoxetine (CYMBALTA) 30 MG capsule Take 1 capsule (30 mg) by mouth daily (Patient not taking: No sig reported)       ergocalciferol (ERGOCALCIFEROL) 1.25 MG (74598 UT) capsule One po twice a week (Patient not taking: No sig reported)       phentermine (ADIPEX-P) 15 MG capsule Take 1 capsule (15 mg) by mouth every morning (Patient not taking: No sig reported) 30 capsule 1     Semaglutide-Weight Management (WEGOVY) 0.25 MG/0.5ML SOAJ Inject 0.25 mg Subcutaneous every 7 days For month 1: 0.25mg weekly x 4 weeks; For month 2: 0.5mg weekly x 4 weeks; Then Buffy will re-evaluate for further increases. (Patient not taking: No sig reported) 3 mL 0    )  ( Diabetes Eval:    )    ( Pain Eval:  No Pain (0) )    ( Wound Eval:       )    (   History   Smoking Status     Current Every " Day Smoker   Smokeless Tobacco     Never Used    )    ( Signed By:  Yaneli Ulrich, EMT; April 27, 2022; 8:57 AM )

## 2022-05-02 ENCOUNTER — VIRTUAL VISIT (OUTPATIENT)
Dept: ENDOCRINOLOGY | Facility: CLINIC | Age: 32
End: 2022-05-02
Payer: COMMERCIAL

## 2022-05-02 DIAGNOSIS — Z71.3 NUTRITIONAL COUNSELING: Primary | ICD-10-CM

## 2022-05-02 DIAGNOSIS — E66.01 MORBID OBESITY (H): ICD-10-CM

## 2022-05-02 DIAGNOSIS — Z98.84 HISTORY OF REMOVAL OF LAPAROSCOPIC GASTRIC BANDING DEVICE: ICD-10-CM

## 2022-05-02 PROCEDURE — 97803 MED NUTRITION INDIV SUBSEQ: CPT | Mod: 95 | Performed by: DIETITIAN, REGISTERED

## 2022-05-02 NOTE — PROGRESS NOTES
"Alcides Beauchamp is a 31 year old male who is being evaluated via a billable telephone visit.     The patient has been notified of following:     \"This telephone visit will be conducted via a call between you and your physician/provider. We have found that certain health care needs can be provided without the need for a physical exam.  This service lets us provide the care you need with a short phone conversation.  If a prescription is necessary we can send it directly to your pharmacy.  If lab work is needed we can place an order for that and you can then stop by our lab to have the test done at a later time.    Telephone visits are billed at different rates depending on your insurance coverage. During this emergency period, for some insurers they may be billed the same as an in-person visit.  Please reach out to your insurance provider with any questions.    If during the course of the call the physician/provider feels a telephone visit is not appropriate, you will not be charged for this service.\"    Patient has given verbal consent for Telephone visit?  Yes    How would you like to obtain your AVS? Mychart    Phone call duration: 8 minutes    During this virtual visit the patient is located in [place], patient verifies this as the location during the entirety of this visit.          Return Bariatric Nutrition Consultation Note    Reason For Visit: Nutrition Assessment    Alcides Beauchamp is a 31 year old presenting today for a return bariatric nutrition consult.   Pt is interested in laparoscopic sleeve gastrectomy or RNY with Dr. Sanchez expected surgery in [place].  Patient is accompanied by self.  This is pt's 3rd of 3 required nutrition visits prior to surgery.     Pt referred by ANITHA Johnson on February 15, 2022.  Patient with Co-morbidities of obesity including:  Type II DM no  Renal Failure no  Sleep apnea yes  Hypertension no   Dyslipidemia no  Joint pain no  Back pain no  GERD no     Support System Reviewed With " "Patient 10/21/2021   Who do you have in your support network that can be available to help you for the first 2 weeks after surgery? wife and family   Who can you count on for support throughout your weight loss surgery journey? wife and family       ANTHROPOMETRICS:  Estimated body mass index is 48.22 kg/m  as calculated from the following:    Height as of 4/27/22: 1.702 m (5' 7\").    Weight as of 4/27/22: 139.7 kg (307 lb 14.4 oz).     Current weight (4/22): 307 lbs per pt (has not checked recently)    Required weight loss goal pre-op: -20 lbs from initial consult weight (goal weight 287 lbs or less before surgery)       10/21/2021   I have tried the following methods to lose weight Weight Loss Surgery       Weight Loss Questions Reviewed With Patient 10/21/2021   How long have you been overweight? Since puberty       SUPPLEMENT INFORMATION:  Vitamin D    Saxenda    NUTRITION HISTORY:  Hx of lap band (2009). Has gained ~100 lbs in the last year. Pt has been struggling with consuming large amounts of \"junk\" food. Wife is not cooking d/t being pregnant with twins. Struggles with large portion sizes.     Pt waiting for Dr. Carnes to determine if gastric sleeve or RNY is the best option.    3/9/22: Pt states he has noticed Saxenda working. He has less cravings and a lower appetite. Unsure of best time to be taking it-has been taking right before bed. Dr. Sanchez would like to do an endoscopy before deciding which surgery to pursue. Overall has improved diet- focused on more protein and vegetables and less processed foods.    4/1/22: Pt reports he has stopped taking Saxenda d/t sleepiness and body aches. Trying to decrease carbohydrates in his meals. Still only eating one meal per day in the evenings and snacking occasionally. States he does not think he will be able to change his diet habits and would like to do a modified liquid diet prior to surgery to reach his goal weight.    5/2/22: Recently saw Dr." Trice and is going to move forward with surgery. Required to lose 20 lbs prior to surgery. Currently taking Saxenda and trying to have smaller portions. Pt wants to utilize the modified liquid diet once surgery date is closer.       Recall Diet Questions Reviewed With Patient 10/21/2021   Describe what you typically consume for breakfast (typical or most recent): no breakfast   Describe what you typically consume for lunch (typical or most recent): fries burger ,pop   Describe what you typically consume for supper (typical or most recent): mozzrilla stick pop   Describe what you typically consume as snacks (typical or most recent): anything chips   How many ounces of water, or other low calorie drinks, do you drink daily (8 oz=1 glass)? 64 oz or more   How many ounces of caffeine (coffee, tea, pop) do you drink daily (8 oz=1 glass)? 32 oz   How many ounces of carbonated (pop, beer, sparkling water) drinks do you drinky daily (8 oz=1 glass)? 8 oz   How many ounces of juice, pop, sweet tea, sports drinks, protein drinks, other sweetened drinks, do you drink daily (8 oz=1 glass)? -   How many ounces of milk do you drink daily (8 oz=1 glass) 8 oz   Please indicate the type of milk: 1%   How often do you drink alcohol? Monthly or less   If you do drink alcohol, how many drinks might you have in a day? (one drink = 5 oz. wine, 1 can/bottle of beer, 1 shot liquor) 1 or 2       Eating Habits 10/21/2021   Do you have any dietary restrictions? No   Do you currently binge eat (eat a large amount of food in a short time)? No   Are you an emotional eater? No   Do you get up to eat after falling asleep? No   What foods do you crave? anything chocolate,chips     Progress on Previous Goals  1) Pre plan meals and snacks improving, continues  2) Eat 3 meals per day improving, continues  3) Track 1900 calories using K[employer] Pal as able not met    ADDITIONAL INFORMATION:  Physical activity- moves around a lot at work    Dining  Out History Reviewed With Patient 10/21/2021   How often do you dine out? A couple of times a week.   Where do you dine out? (select all that apply) sit-down restaurants, fast food chains   What types of food do you order when you dine out? shira burger potatoe pop       Physical Activity Reviewed With Patient 2/7/2022   How often do you exercise? Never   What is the duration of your exercise (in minutes)? -   What types of exercise do you do? -   What keeps you from being more active?  Lack of Time         NUTRITION DIAGNOSIS:  Obesity r/t long history of positive energy balance aeb BMI >30 kg/m2.    INTERVENTION:  Intervention Provided/Education Provided on post-op diet guidelines, vitamins/minerals essential post-operatively, GI anatomy of bariatric surgeries, ways to help prepare for post-op diet guidelines pre-operatively, portion/calorie-control, mindful eating and sources of protein.  Patient demonstrates understanding. Provided pt with list of goals RD contact information.      Questions Reviewed With Patient 10/21/2021   How ready are you to make changes regarding your weight? Number 1 = Not ready at all to make changes up to 10 = very ready. 10   How confident are you that you can change? 1 = Not confident that you will be successful making changes up to 10 = very confident. 10       Expected Engagement: good    GOALS:  1) Pre plan meals and snacks   2) Eat 3 meals per day  3) Track 1900 calories using Ge[employer] Pal as able  4) Modified Liquid Diet (2 liquid meals, 1 meal, 2 snacks)  [email or web address]       Protein Sources for Weight Loss  [email or web address]     Carbohydrates  [email or web address]     Mindful Eating  [email or web address]     Summary of Volumetrics Eating Plan  [email or web address]     Diet Guidelines after Weight Loss Surgery  [email or web address]     Seated Exercises for Arms and Legs (can be done before or after  surgery)  [email or web address]    Follow up: 1 month, prn    Time spent with patient: 8 minutes.  TORI BARROS RD, LD

## 2022-05-02 NOTE — LETTER
"5/2/2022       RE: Alcides Beauchamp  [address]  [place] [place] [address]     Dear Colleague,    Thank you for referring your patient, Alcides Beauchamp, to the [facility] [place] [facility]. Please see a copy of my visit note below.    Alcides Beauchamp is a 31 year old male who is being evaluated via a billable telephone visit.     The patient has been notified of following:     \"This telephone visit will be conducted via a call between you and your physician/provider. We have found that certain health care needs can be provided without the need for a physical exam.  This service lets us provide the care you need with a short phone conversation.  If a prescription is necessary we can send it directly to your pharmacy.  If lab work is needed we can place an order for that and you can then stop by our lab to have the test done at a later time.    Telephone visits are billed at different rates depending on your insurance coverage. During this emergency period, for some insurers they may be billed the same as an in-person visit.  Please reach out to your insurance provider with any questions.    If during the course of the call the physician/provider feels a telephone visit is not appropriate, you will not be charged for this service.\"    Patient has given verbal consent for Telephone visit?  Yes    How would you like to obtain your AVS? Sultanat    Phone call duration: 8 minutes    During this virtual visit the patient is located in [place], patient verifies this as the location during the entirety of this visit.          Return Bariatric Nutrition Consultation Note    Reason For Visit: Nutrition Assessment    Alcides Beauchamp is a 31 year old presenting today for a return bariatric nutrition consult.   Pt is interested in laparoscopic sleeve gastrectomy or RNY with Dr. Sanchez expected surgery in [place].  Patient is accompanied by self.  This is pt's 3rd of 3 " "required nutrition visits prior to surgery.     Pt referred by ANITHA Johnson on February 15, 2022.  Patient with Co-morbidities of obesity including:  Type II DM no  Renal Failure no  Sleep apnea yes  Hypertension no   Dyslipidemia no  Joint pain no  Back pain no  GERD no     Support System Reviewed With Patient 10/21/2021   Who do you have in your support network that can be available to help you for the first 2 weeks after surgery? wife and family   Who can you count on for support throughout your weight loss surgery journey? wife and family       ANTHROPOMETRICS:  Estimated body mass index is 48.22 kg/m  as calculated from the following:    Height as of 4/27/22: 1.702 m (5' 7\").    Weight as of 4/27/22: 139.7 kg (307 lb 14.4 oz).     Current weight (4/22): 307 lbs per pt (has not checked recently)    Required weight loss goal pre-op: -20 lbs from initial consult weight (goal weight 287 lbs or less before surgery)       10/21/2021   I have tried the following methods to lose weight Weight Loss Surgery       Weight Loss Questions Reviewed With Patient 10/21/2021   How long have you been overweight? Since puberty       SUPPLEMENT INFORMATION:  Vitamin D    Saxenda    NUTRITION HISTORY:  Hx of lap band (2009). Has gained ~100 lbs in the last year. Pt has been struggling with consuming large amounts of \"junk\" food. Wife is not cooking d/t being pregnant with twins. Struggles with large portion sizes.     Pt waiting for Dr. Carnes to determine if gastric sleeve or RNY is the best option.    3/9/22: Pt states he has noticed Saxenda working. He has less cravings and a lower appetite. Unsure of best time to be taking it-has been taking right before bed. Dr. Sanchez would like to do an endoscopy before deciding which surgery to pursue. Overall has improved diet- focused on more protein and vegetables and less processed foods.    4/1/22: Pt reports he has stopped taking Saxenda d/t sleepiness and body aches. " Trying to decrease carbohydrates in his meals. Still only eating one meal per day in the evenings and snacking occasionally. States he does not think he will be able to change his diet habits and would like to do a modified liquid diet prior to surgery to reach his goal weight.    5/2/22: Recently saw Dr. Carnes and is going to move forward with surgery. Required to lose 20 lbs prior to surgery. Currently taking Saxenda and trying to have smaller portions. Pt wants to utilize the modified liquid diet once surgery date is closer.       Recall Diet Questions Reviewed With Patient 10/21/2021   Describe what you typically consume for breakfast (typical or most recent): no breakfast   Describe what you typically consume for lunch (typical or most recent): fries burger ,pop   Describe what you typically consume for supper (typical or most recent): mozzrilla stick pop   Describe what you typically consume as snacks (typical or most recent): anything chips   How many ounces of water, or other low calorie drinks, do you drink daily (8 oz=1 glass)? 64 oz or more   How many ounces of caffeine (coffee, tea, pop) do you drink daily (8 oz=1 glass)? 32 oz   How many ounces of carbonated (pop, beer, sparkling water) drinks do you drinky daily (8 oz=1 glass)? 8 oz   How many ounces of juice, pop, sweet tea, sports drinks, protein drinks, other sweetened drinks, do you drink daily (8 oz=1 glass)? -   How many ounces of milk do you drink daily (8 oz=1 glass) 8 oz   Please indicate the type of milk: 1%   How often do you drink alcohol? Monthly or less   If you do drink alcohol, how many drinks might you have in a day? (one drink = 5 oz. wine, 1 can/bottle of beer, 1 shot liquor) 1 or 2       Eating Habits 10/21/2021   Do you have any dietary restrictions? No   Do you currently binge eat (eat a large amount of food in a short time)? No   Are you an emotional eater? No   Do you get up to eat after falling asleep? No   What foods do  you crave? anything chocolate,chips     Progress on Previous Goals  1) Pre plan meals and snacks improving, continues  2) Eat 3 meals per day improving, continues  3) Track 1900 calories using [employer] Pal as able not met    ADDITIONAL INFORMATION:  Physical activity- moves around a lot at work    Dining Out History Reviewed With Patient 10/21/2021   How often do you dine out? A couple of times a week.   Where do you dine out? (select all that apply) sit-down restaurants, fast food chains   What types of food do you order when you dine out? steak burger potatoe pop       Physical Activity Reviewed With Patient 2/7/2022   How often do you exercise? Never   What is the duration of your exercise (in minutes)? -   What types of exercise do you do? -   What keeps you from being more active?  Lack of Time         NUTRITION DIAGNOSIS:  Obesity r/t long history of positive energy balance aeb BMI >30 kg/m2.    INTERVENTION:  Intervention Provided/Education Provided on post-op diet guidelines, vitamins/minerals essential post-operatively, GI anatomy of bariatric surgeries, ways to help prepare for post-op diet guidelines pre-operatively, portion/calorie-control, mindful eating and sources of protein.  Patient demonstrates understanding. Provided pt with list of goals RD contact information.      Questions Reviewed With Patient 10/21/2021   How ready are you to make changes regarding your weight? Number 1 = Not ready at all to make changes up to 10 = very ready. 10   How confident are you that you can change? 1 = Not confident that you will be successful making changes up to 10 = very confident. 10       Expected Engagement: good    GOALS:  1) Pre plan meals and snacks   2) Eat 3 meals per day  3) Track 1900 calories using World Blender Pal as able  4) Modified Liquid Diet (2 liquid meals, 1 meal, 2 snacks)  [email or web address]       Protein Sources for Weight Loss  [email or web address]      Carbohydrates  [email or web address]     Mindful Eating  [email or web address]     Summary of Volumetrics Eating Plan  [email or web address]     Diet Guidelines after Weight Loss Surgery  [email or web address]     Seated Exercises for Arms and Legs (can be done before or after surgery)  [email or web address]    Follow up: 1 month, prn    Time spent with patient: 8 minutes.  TORI BARROS RD, LD

## 2022-05-03 NOTE — PATIENT INSTRUCTIONS
Hi Amr!    Follow-up with RD in 1 month    Thank you,    Zenia Cardona, RD, LD  If you would like to schedule or reschedule an appointment with the RD, please call [phone]    Nutrition Goals  1) Pre plan meals and snacks   2) Eat 3 meals per day  3) Track 1900 calories using [employer] Pal as able  4) Modified Liquid Diet (2 liquid meals, 1 meal, 2 snacks)  [email or web address]       Protein Sources for Weight Loss  [email or web address]     Carbohydrates  [email or web address]     Mindful Eating  [email or web address]     Summary of Volumetrics Eating Plan  [email or web address]     Diet Guidelines after Weight Loss Surgery  [email or web address]     Seated Exercises for Arms and Legs (can be done before or after surgery)  [email or web address]      Interested in working with a health ? Health coaches work with you to improve your overall health and wellbeing. They look at the whole person, and may involve discussion of different areas of life, including, but not limited to the four pillars of health (sleep, exercise, nutrition, and stress management). Discuss with your care team if you would like to start working a health .    Health Coaching-3 Pack:    $99 for three health coaching visits    Visits may be done in person or via phone    Coaching is a partnership between the  and the client; Coaches do not prescribe or diagnose    Coaching helps inspire the client to reach his/her personal goals    COMPREHENSIVE WEIGHT MANAGEMENT PROGRAM  VIRTUAL SUPPORT GROUPS    For Support Group Information:      We offer support groups for patients who are working on weight loss and considering, preparing for or have had weight loss surgery.   There is no cost for this opportunity.  You are invited to attend the?Virtual Support Groups?provided by any of the following locations:    [employer] via [employer] Teams with Amy Benavidez RN  2.   [place]  "via Microsoft Teams with Donald Obrien, PhD, LP  3.   [place] via [employer] Teams with Cristin Baltazar RN  4.   [facility] via [employer] Teams with Cristin Belcher [facility]    The following Support Group information can also be found on our website:  [email or web address]      [facility] Weight Loss Surgery Support Group    [facility] Weight Loss Surgery Support Group  The support group is a patient-lead forum that meets monthly to share experiences, encouragement and education. It is open to those who have had weight loss surgery, are scheduled for surgery, and those who are considering surgery.   WHEN: This group meets on the 3rd Wednesday of each month from 5:00PM - 6:00PM virtually using Microsoft Teams.   FACILITATOR: Led by Amy Benavidez RD, LD, RN, the program's Clinical Coordinator.   TO REGISTER: Please contact the clinic via [employer] or call the nurse line directly at [phone] to inform our staff that you would like an invite sent to you and to let us know the email you would like the invite sent to. Prior to the meeting, a link with directions on how to join the meeting will be sent to you.    2022 Meetings  January 19: \"Let's Talk\" a time for the group to share.  February 16: \"Let's Talk\" a time for the group to share.  March 16: Guest Speakers: Psychologists, Lu Lees, PhD,LP and Marisol Quiles PsDanny,LP  April 20: Guest Speaker: Health , Asmita Akhtar, [facility],CHES, CPT  May 18: Guest Speaker: DietitianMiguel, HAYES, LP  Mone 15: \"Let's Talk\" a time for the group to share.  July 20: \"Let's Talk\" a time for the group to share.  August 17: TBA  September 21: TBA  October 19: Guest Speaker: Dr Dale Remy MD Pulmonologist and Sleep Medicine Physician, \"Getting a Good Night's Sleep\".  November 16: TBA  December 21: TBA    [facility] and [facility] Support Groups    Connections: " "Bariatric Care Support Group?  This is open to all [facility] (and those external to this program) pre- and post- operative bariatric surgery patients as well as their support system.   WHEN: This group meets the 2nd Tuesday of each month from 6:30 PM - 8:00 PM virtually using Microsoft Teams.   FACILITATOR: Led by Donald Obrien, Ph.D who is a Licensed Psychologist with the [facility] Comprehensive Weight Management Program.   TO REGISTER: Please send an email to Donald Obrien, Ph.D., LP at?[email or web address]?if you would like an invitation to the group and to learn about using Microsoft Teams.    2022 Meetings  January 11: Tash Mendoza, PharmD, Pharmacy Resident at [facility], \"Medications and Bariatric Surgery\".  February 8: Open Forum  March 8  April 12  May 10  Mone 14    Connections: Post-Operative Bariatric Surgery Support Group  This is a support group for [facility] bariatric patients (and those external to [facility]) who have had bariatric surgery and are at least 3 months post-surgery.  WHEN: This support group meets the 4th Wednesday of the month from 11:00 AM - 12:00 PM virtually using Microsoft Teams.   FACILITATOR: Led by Certified Bariatric Nurse, Cristin Baltazar RN.   TO REGISTER: Please send an email to Cristin at [email or web address] if you would like an invitation to the group and to learn about using Microsoft Teams.    2022 Meetings  January 26  February 23  March 23  April 27  May 25  Mone 22    [facility] Healthy Lifestyle Virtual Support Group    Healthy Lifestyle Virtual Support Group?  This is 60 minutes of small group guided discussion, support and resources. All are welcome who want a healthy lifestyle.  WHEN: This group meets monthly on a Friday from 12:30 PM - 1:30 PM virtually using Microsoft Teams.   FACILITATOR: Led by National Board Certified Health and , MAHNAZ [facility].   TO " "REGISTER: Please send an email to Cristin [email or web address] to receive monthly invites to the group or if you have any questions about having a health .  Prior to the meeting, a link with directions on how to join the meeting will be sent to you.    2022 Meetings  January 21: Antonella Domingo MS, RN, CIC, CBN, \"Healthy Habits\"  February 25: Open Forum  March 18: \"Setting Limits and Boundaries\"  April 29: Sanjuanita Royal RD, \"Meal Planning Made Easy\"  May 20: Open Forum  June: To be determined                "

## 2022-05-10 ENCOUNTER — VIRTUAL VISIT (OUTPATIENT)
Dept: PHARMACY | Facility: CLINIC | Age: 32
End: 2022-05-10
Payer: COMMERCIAL

## 2022-05-10 ENCOUNTER — TELEPHONE (OUTPATIENT)
Dept: NEUROPSYCHOLOGY | Facility: CLINIC | Age: 32
End: 2022-05-10
Payer: COMMERCIAL

## 2022-05-10 DIAGNOSIS — E66.813 CLASS 3 SEVERE OBESITY DUE TO EXCESS CALORIES WITH SERIOUS COMORBIDITY AND BODY MASS INDEX (BMI) OF 45.0 TO 49.9 IN ADULT (H): Primary | ICD-10-CM

## 2022-05-10 DIAGNOSIS — F41.9 ANXIETY: ICD-10-CM

## 2022-05-10 DIAGNOSIS — U07.0 VAPING-RELATED DISORDER: ICD-10-CM

## 2022-05-10 DIAGNOSIS — G44.209 TENSION HEADACHE: ICD-10-CM

## 2022-05-10 DIAGNOSIS — E55.9 VITAMIN D DEFICIENCY: ICD-10-CM

## 2022-05-10 DIAGNOSIS — E66.01 CLASS 3 SEVERE OBESITY DUE TO EXCESS CALORIES WITH SERIOUS COMORBIDITY AND BODY MASS INDEX (BMI) OF 45.0 TO 49.9 IN ADULT (H): Primary | ICD-10-CM

## 2022-05-10 PROCEDURE — 99207 PR NO CHARGE LOS: CPT | Performed by: PHARMACIST

## 2022-05-10 RX ORDER — LIRAGLUTIDE 6 MG/ML
0.6 INJECTION, SOLUTION SUBCUTANEOUS DAILY
COMMUNITY
End: 2022-12-08

## 2022-05-10 NOTE — PATIENT INSTRUCTIONS
"Recommendations from today's MTM visit:                                                    MTM (medication therapy management) is a service provided by a clinical pharmacist designed to help you get the most of out of your medicines.   Today we reviewed what your medicines are for, how to know if they are working, that your medicines are safe and how to make your medicine regimen as easy as possible.      1. Increase Saxenda to 1.2 mg daily    2. One to two weeks later after increasing Saxenda, increase topiramate to 50 mg daily for 1 week then if tolerating increase topiramate to 75 mg daily thereafter. Do not increase topiramate the same time as Saxenda increased dose.    3. Start vitamin D 2000 international unit(s) daily    4. Switch to a non-nicotine containing cartridge for vaping pen. Should stop using vaping pen altogether within the next 4-6 weeks if possible.  Long term vaping (even without nicotine) can increase the risk of returning to nicotine containing cartridges in the future.Tobacco/nicotine restart after having Sleeve Gastrectomy or Gastric Bypass can increase risk of gastrointestinal bleeding, ulcer, impaired wound healing, lung cancer, etc.     5. Pharmacist to reach out to surgical team to start prep for bariatric surgery process.     Follow-up: 1 month (call [phone] to schedule)     It was great speaking with you today.  I value your experience and would be very thankful for your time in providing feedback in our clinic survey. In the next few days, you may receive an email or text message from [employer] with a link to a survey related to your  clinical pharmacist.\"     My Clinical Pharmacist's contact information:                                                      Please feel free to contact me with any questions or concerns you have.      Lauren Bloch, PharmD  Medication Therapy Management Pharmacist   [facility] [place]             "

## 2022-05-10 NOTE — TELEPHONE ENCOUNTER
I called patient to schedule a pre-bariatric surgery psychological evaluation with Dr. Armstrong. First available was 8/16 at 9 a.m. for a video visit. To plan on 2 hours for interview followed by MMPI testing. Did ask the patient to call me if for some reason he has to reschedule. He did ask if I could keep him in mind should an earlier appointment become available which I said I would do.

## 2022-05-10 NOTE — PROGRESS NOTES
Medication Therapy Management (MTM) Encounter    ASSESSMENT:                            Medication Adherence/Access: No issues identified    Obesity: Would benefit from retrial increasing Saxenda to 1.2 mg daily then 1-2 weeks later can titrate topiramate as prescribed to assist with getting to weight loss goal for surgery. Will contact surgical team for next steps for on boarding for potential surgery and doesn't appear this process has started yet. Briefly discussed need for X amount of monthly dietitian visits with sign off from dietitians for surgery (per insurance, so TBD), psych evaluation and sign off, need to be 3 months tobacco/nicotine free, 20 lb weight loss, etc.     Anxiety: Stable.     Vitamin D Deficiency: Would benefit from starting daily vitamin D due to recent history, vitamin D 2000 international unit(s) daily.     Pain: Stable for now. Will need to be off post op.     Nicotine Vaping: It is required to stop tobacco/nicotine products at least 3 months before surgery and to have negative nicotine test 4 weeks from surgery date. Patient would benefit from transitioning to 0% nicotine cartridges over the next 2-4 weeks then consider stopping vaping altogether. Long term vaping (even without nicotine) can increase the risk of returning to nicotine containing cartridges in the future. Tobacco post Sleeve Gastrectomy or RYGB can increase risk of gastrointestinal bleeding, ulcer, impaired wound healing etc. Can consider nicotine replacement therapy  Or other therapy in future if difficult to transition to non-nicotine vaping pen but due to what appears as limited nicotine on daily basis will see if cartridge transition is effective.     PLAN:                            1. Increase Saxenda to 1.2 mg daily    2. One to two weeks later after increasing Saxenda, increase topiramate to 50 mg daily for 1 week then if tolerating increase topiramate to 75 mg daily thereafter. Do not increase topiramate the same  "time as Saxenda increased dose.    3. Start vitamin D 2000 international unit(s) daily    4. Switch to a non-nicotine containing cartridge for vaping pen. Should stop using vaping pen altogether within the next 4-6 weeks if possible.  Long term vaping (even without nicotine) can increase the risk of returning to nicotine containing cartridges in the future.Tobacco/nicotine restart after having Sleeve Gastrectomy or Gastric Bypass can increase risk of gastrointestinal bleeding, ulcer, impaired wound healing, lung cancer, etc.     5. Pharmacist to reach out to surgical team to start prep for bariatric surgery process.     Follow-up: 1 month (call [phone] to schedule)     SUBJECTIVE/OBJECTIVE:                          Alcides Beauchamp is a 31 year old male called for an initial visit. He was referred to me from Buffy Forbes PA-C.      Reason for visit: comprehensive review of medications. Wants bariatric surgery.     Allergies/ADRs: Reviewed in chart  Past Medical History: Reviewed in chart  Tobacco: He reports that he has been smoking. He has never used smokeless tobacco.Tobacco Cessation Action Plan:   Currently vaping, disposable, 5% nicotine, takes 5 days to get through.   Alcohol: not currently using    Medication Adherence/Access: no issues reported    Obesity:   Topiramate 25 mg once daily.   Saxenda 0.6 mg daily bedtime    Followed by Buffy Forbes PA-C, seen 4/7/2022 for Return Medical Weight Management. Reports met with Dr. Sanchez, suggested transition from lap-band to Sleeve Gastrectomy versus Homero-en-Y. He is interested in either surgery. He would like to start the process for this. Has been meeting with dietitian monthly. Knows he needs to lose 20 lb from initial consult weight. [place] Saxenda overseas. Tried increasing Saxenda to 1.2 mg daily at same time of topiramate start, was tired and felt \"unwell\" so decreased back to 0.6 mg daily. Has been on lower dose now for 2 weeks again. Didn't know to " "titrate up on Topiramate after prescribed. Did not start Phentermine that was prescribed with topiramate 1 month ago as started Saxenda.     Initial Consult Weight: 307 lb  Goal Weight for Surgery: 287 lb (-20 lb)     Wt Readings from Last 4 Encounters:   04/27/22 307 lb 14.4 oz (139.7 kg)   04/13/22 308 lb 3.3 oz (139.8 kg)   02/07/22 306 lb 9.6 oz (139.1 kg)   11/17/21 294 lb 4.8 oz (133.5 kg)     Estimated body mass index is 48.22 kg/m  as calculated from the following:    Height as of 4/27/22: 5' 7\" (1.702 m).    Weight as of 4/27/22: 307 lb 14.4 oz (139.7 kg).    Anxiety:   Duloxetine 30mg daily  Hydroxyzine 25 mg as needed     Feels anxiety is controlled. Not needing hydroxyzine. No medication side effects.     Vitamin D Deficiency:  Vitamin D 50,000 international unit(s) weekly - completed.     Completed this some months ago. Not on vitamin D at this time.   Vitamin D Deficiency Screening Results:  Lab Results   Component Value Date    VITDT 12 (L) 10/22/2021     Pain:   Ibuprofen 400-600 mg as needed    Reports uses rarely for random aches/pains or headache.     Nicotine Vaping:   Currently vaping, disposable, 5% nicotine, takes 5 days to get through. Reports that he has been doing this for a while. He does it for anxiety and out of habit.     ----------------  Post Discharge Medication Reconciliation Status: discharge medications reconciled, continue medications without change.    I spent 20 minutes with this patient today. All changes were made via collaborative practice agreement with Buffy Forbes PA-C. A copy of the visit note was provided to the patient's provider(s).    The patient was sent via [employer] a summary of these recommendations.     Lauren Bloch, PharmD, BCACP   Medication Therapy Management Pharmacist   [facility]    Telemedicine Visit Details  Type of service:  Telephone visit  Start Time: 11:20 AM (patient called back after trying to reach after 2 " attempts).   End Time: 11:40 AM  Originating Location (patient location): Home  Distant Location (provider location):  [facility]     Medication Therapy Recommendations  Class 3 severe obesity due to excess calories with serious comorbidity and body mass index (BMI) of 45.0 to 49.9 in adult (H)    Current Medication: liraglutide - Weight Management (SAXENDA) 18 MG/3ML pen   Rationale: Dose too low - Dosage too low - Effectiveness   Recommendation: Increase Dose   Status: Patient Agreed - Adherence/Education          Current Medication: topiramate (TOPAMAX) 25 MG tablet   Rationale: Does not understand instructions - Adherence - Adherence   Recommendation: Provide Education   Status: Patient Agreed - Adherence/Education         Vitamin D deficiency    Rationale: Untreated condition - Needs additional medication therapy - Indication   Recommendation: Start Medication - vitamin D3 50 mcg (2000 units) tablet   Status: Accepted - no CPA Needed

## 2022-05-12 ENCOUNTER — CARE COORDINATION (OUTPATIENT)
Dept: ENDOCRINOLOGY | Facility: CLINIC | Age: 32
End: 2022-05-12
Payer: COMMERCIAL

## 2022-05-18 ENCOUNTER — CARE COORDINATION (OUTPATIENT)
Dept: ENDOCRINOLOGY | Facility: CLINIC | Age: 32
End: 2022-05-18
Payer: COMMERCIAL

## 2022-05-18 DIAGNOSIS — Z72.0 TOBACCO ABUSE: Primary | ICD-10-CM

## 2022-05-18 NOTE — PROGRESS NOTES
Tasklist updated and sent to patient via [employer].    Bariatric Task List  Fax:  Please fax all paperwork to: [phone] -     Status:  Is patient a candidate for bariatric surgery?:  patient is a candidate for bariatric surgery -     Cleared to schedule surgeon consult?:  cleared to schedule surgeon consult - 4/27/22 appt. bks   Status:    -     Surgeon: Daniel -     Tentative surgery month/year: August or September 2022, based on when tasks are done. -        Insurance: Insurance:  [facility] -      Contact insurance to discuss coverage: Completed -   Collins confirmed with insurance. To submit documentation when available. [facility]      Patient Info: Initial Weight:  307 - Per Dr Sanchez's note.s   Date of Initial Weight/Height:  4/27/2022 -     Goal Weight (lbs):  287 - Per Dr Sanchez's note. bks   Required Weight Loss:  20 lb -     Surgery Type:  other bariatric surgery - Possible sleeve gastrectomy or possible brunilda-en-Y gastric bypass (post gastric band removal in 2020), hiatal hernia repair      Dietician Visits: Structured weight loss required by insurance?:  structured weight loss required -     Dietician Visit 1:  Completed - 2/16/22. bks   Dietician Visit 2:  Completed - 3/9/22. bks   Dietician Visit 3:  Completed - 4/1/22. bks   Dietician Visit 4:  Completed - 5/2/22 bks   Dietician Visit 5:  Needed - 6/2/22 appt. bks   Dietician Visit 6:    -     Dietician Visit additional:  Needed - Monthly until surgery for weight loss and postop diet teaching. s      Psychological Evaluation: Psych eval:  Needed - List and letter sent to pt 5/12/22 - AS; 8/16/22 Dr Armstrong appt. bks      Lab Work: Complete Blood Count:  Completed - 10/22/21. bks   Comprehensive Metabolic Panel:  Completed - 10/22/21. bks   Vitamin D:  Needed - 10/22/21 =12, recheck. bks   PTH:  Completed - 10/22/21 bks   Hgb A1c:  Completed - 10/22/21. bks    Lipids: Completed - 10/22/21  bks   Nicotine Testing:  Needed - Check level 1 month  "after quitting all nicotine products. Surgery can be 2 months after a negative level. bks      Testing: EGD:  Completed - 4/13/22. bks      PCP: Establish care with PCP:  Completed -     PCP letter of support:  Needed -        Stopping Smoking/ Alcohol Use: Quit tobacco use (3 months smoke free)?:  Needed - Smoking cessation information sent to pt 5/12/22 - AS   Quit date:    - Check level one month off of all smoking/nicotine products. bks      Patient Education:  Information Session:  Needed -     Given \"Making your decision\" handout?:  Yes - 5/12/22 - AS   Given \"A Roadmap to you Weight Loss Surgery\" handout?: Yes - 5/12/22 - AS   Given \"Get Well Loop\" information?: Yes - 5/12/22 - AS   Given support group information?:    -     Attended support group?:  Needed -     Support plan in place?:  Needed -        Additional Surgery Requirements: Final nicotine screen:  Needed - 1 month after smoking cessation. - AS      Final Tasks:  Before surgery online class:  Needed -     Before surgery online class website link:  [email or web address]   After surgery online class:  Needed -     After surgery online class website link:  [email or web address]   Nurse visit per clinic:  Needed -     History and Physical per clinic:   -  Pre-Assessment Clinic   Final labs per clinic: Needed -        Notes: Please register for the Sleeve Gastrectomy Get Well Loop when you get an email invitation after you get a surgery date.   The Get Well Loop will give you information via email or text messages that can help you be more successful before and after surgery for up to one year after surgery.  It can also help answer any questions you may have.   Get Well Loop Handout - [email or web address]   -            "

## 2022-06-02 ENCOUNTER — VIRTUAL VISIT (OUTPATIENT)
Dept: ENDOCRINOLOGY | Facility: CLINIC | Age: 32
End: 2022-06-02
Payer: COMMERCIAL

## 2022-06-02 DIAGNOSIS — E66.01 CLASS 3 SEVERE OBESITY DUE TO EXCESS CALORIES WITH SERIOUS COMORBIDITY AND BODY MASS INDEX (BMI) OF 45.0 TO 49.9 IN ADULT (H): ICD-10-CM

## 2022-06-02 DIAGNOSIS — Z98.84 HISTORY OF REMOVAL OF LAPAROSCOPIC GASTRIC BANDING DEVICE: ICD-10-CM

## 2022-06-02 DIAGNOSIS — E66.813 CLASS 3 SEVERE OBESITY DUE TO EXCESS CALORIES WITH SERIOUS COMORBIDITY AND BODY MASS INDEX (BMI) OF 45.0 TO 49.9 IN ADULT (H): ICD-10-CM

## 2022-06-02 DIAGNOSIS — Z71.3 NUTRITIONAL COUNSELING: Primary | ICD-10-CM

## 2022-06-02 PROCEDURE — 97803 MED NUTRITION INDIV SUBSEQ: CPT | Mod: 95 | Performed by: DIETITIAN, REGISTERED

## 2022-06-02 NOTE — LETTER
"6/2/2022       RE: Alcides Beauchamp  [address]  [place] [address]     Dear Colleague,    Thank you for referring your patient, Alcides Beauchamp, to the [facility] [place] at [facility]. Please see a copy of my visit note below.    Alcides Beauchamp is a 31 year old male who is being evaluated via a billable telephone visit.     The patient has been notified of following:     \"This telephone visit will be conducted via a call between you and your physician/provider. We have found that certain health care needs can be provided without the need for a physical exam.  This service lets us provide the care you need with a short phone conversation.  If a prescription is necessary we can send it directly to your pharmacy.  If lab work is needed we can place an order for that and you can then stop by our lab to have the test done at a later time.    Telephone visits are billed at different rates depending on your insurance coverage. During this emergency period, for some insurers they may be billed the same as an in-person visit.  Please reach out to your insurance provider with any questions.    If during the course of the call the physician/provider feels a telephone visit is not appropriate, you will not be charged for this service.\"    Patient has given verbal consent for Telephone visit?  Yes    How would you like to obtain your AVS? Sultanat    Phone call duration: 8 minutes    During this virtual visit the patient is located in [place], patient verifies this as the location during the entirety of this visit.          Return Bariatric Nutrition Consultation Note    Reason For Visit: Nutrition Assessment    Alcides Beauchamp is a 31 year old presenting today for a return bariatric nutrition consult.   Pt is interested in laparoscopic sleeve gastrectomy or RNY with Dr. Sanchez expected surgery in TBD.  Patient is accompanied by self.  This is pt's 3rd of 3 " "required nutrition visits prior to surgery.     Pt referred by ANITHA Johnson on February 15, 2022.  Patient with Co-morbidities of obesity including:  Type II DM no  Renal Failure no  Sleep apnea yes  Hypertension no   Dyslipidemia no  Joint pain no  Back pain no  GERD no     Support System Reviewed With Patient 10/21/2021   Who do you have in your support network that can be available to help you for the first 2 weeks after surgery? wife and family   Who can you count on for support throughout your weight loss surgery journey? wife and family       ANTHROPOMETRICS:  Estimated body mass index is 48.22 kg/m  as calculated from the following:    Height as of 4/27/22: 1.702 m (5' 7\").    Weight as of 4/27/22: 139.7 kg (307 lb 14.4 oz).     Current weight (6/2/22): 307 lbs per pt (has not checked recently)    Required weight loss goal pre-op: -20 lbs from initial consult weight (goal weight 287 lbs or less before surgery)       10/21/2021   I have tried the following methods to lose weight Weight Loss Surgery       Weight Loss Questions Reviewed With Patient 10/21/2021   How long have you been overweight? Since puberty       SUPPLEMENT INFORMATION:  Vitamin D    Saxenda    NUTRITION HISTORY:  Hx of lap band (2009). Has gained ~100 lbs in the last year. Pt has been struggling with consuming large amounts of \"junk\" food. Wife is not cooking d/t being pregnant with twins. Struggles with large portion sizes.     Pt waiting for Dr. Carnes to determine if gastric sleeve or RNY is the best option.    3/9/22: Pt states he has noticed Saxenda working. He has less cravings and a lower appetite. Unsure of best time to be taking it-has been taking right before bed. Dr. Sanchez would like to do an endoscopy before deciding which surgery to pursue. Overall has improved diet- focused on more protein and vegetables and less processed foods.    4/1/22: Pt reports he has stopped taking Saxenda d/t sleepiness and body aches. " Trying to decrease carbohydrates in his meals. Still only eating one meal per day in the evenings and snacking occasionally. States he does not think he will be able to change his diet habits and would like to do a modified liquid diet prior to surgery to reach his goal weight.    5/2/22: Recently saw Dr. Carnes and is going to move forward with surgery. Required to lose 20 lbs prior to surgery. Currently taking Saxenda and trying to have smaller portions. Pt wants to utilize the modified liquid diet once surgery date is closer.     6/2/22: Pt has noticed that medication has been working. Has reduced portion sizes. Wants to try modified liquid diet.      Recall Diet Questions Reviewed With Patient 10/21/2021   Describe what you typically consume for breakfast (typical or most recent): no breakfast   Describe what you typically consume for lunch (typical or most recent): fries burger ,pop   Describe what you typically consume for supper (typical or most recent): mozzrilla stick pop   Describe what you typically consume as snacks (typical or most recent): anything chips   How many ounces of water, or other low calorie drinks, do you drink daily (8 oz=1 glass)? 64 oz or more   How many ounces of caffeine (coffee, tea, pop) do you drink daily (8 oz=1 glass)? 32 oz   How many ounces of carbonated (pop, beer, sparkling water) drinks do you drinky daily (8 oz=1 glass)? 8 oz   How many ounces of juice, pop, sweet tea, sports drinks, protein drinks, other sweetened drinks, do you drink daily (8 oz=1 glass)? -   How many ounces of milk do you drink daily (8 oz=1 glass) 8 oz   Please indicate the type of milk: 1%   How often do you drink alcohol? Monthly or less   If you do drink alcohol, how many drinks might you have in a day? (one drink = 5 oz. wine, 1 can/bottle of beer, 1 shot liquor) 1 or 2       Eating Habits 10/21/2021   Do you have any dietary restrictions? No   Do you currently binge eat (eat a large amount of  food in a short time)? No   Are you an emotional eater? No   Do you get up to eat after falling asleep? No   What foods do you crave? anything chocolate,chips     Progress on Previous Goals  1) Pre plan meals and snacks improving, continues  2) Eat 3 meals per day met, continues  3) Track 1900 calories using [email or web address] Pal as able  4) Modified Liquid Diet (2 liquid meals, 1 meal, 2 snacks)  [email or web address]     ADDITIONAL INFORMATION:  Physical activity- moves around a lot at work    Dining Out History Reviewed With Patient 10/21/2021   How often do you dine out? A couple of times a week.   Where do you dine out? (select all that apply) sit-down restaurants, fast food chains   What types of food do you order when you dine out? steak burger potatoe pop       Physical Activity Reviewed With Patient 2/7/2022   How often do you exercise? Never   What is the duration of your exercise (in minutes)? -   What types of exercise do you do? -   What keeps you from being more active?  Lack of Time       NUTRITION DIAGNOSIS:  Obesity r/t long history of positive energy balance aeb BMI >30 kg/m2.    INTERVENTION:  Intervention Provided/Education Provided on post-op diet guidelines, vitamins/minerals essential post-operatively, GI anatomy of bariatric surgeries, ways to help prepare for post-op diet guidelines pre-operatively, portion/calorie-control, mindful eating and sources of protein.  Patient demonstrates understanding. Provided pt with list of goals RD contact information.      Questions Reviewed With Patient 10/21/2021   How ready are you to make changes regarding your weight? Number 1 = Not ready at all to make changes up to 10 = very ready. 10   How confident are you that you can change? 1 = Not confident that you will be successful making changes up to 10 = very confident. 10       Expected Engagement: good    GOALS:  1) Pre plan meals and snacks  2) Eat 3 meals per day   3) Track 1900 calories using  MyFitness Pal as able  4) Modified Liquid Diet (2 liquid meals, 1 meal, 2 snacks)  [email or web address]   5) Review handouts below    Your Stage 1 Diet: Clear Liquids  [email or web address]     Your Stage 2 Diet: Low-fat Full Liquids  [email or web address]     Your Stage 3 Diet: Pureed Foods  [email or web address]     Pureed Pleasures  [email or web address]    Your Stage 4 Diet: Soft Foods  [email or web address]    Your Stage 5 Diet: Regular Foods  [email or web address]    Supplements after Weight Loss Surgery  [email or web address]     Protein Sources for Weight Loss  [email or web address]     Carbohydrates  [email or web address]     Mindful Eating  [email or web address]     Summary of Volumetrics Eating Plan  [email or web address]     Diet Guidelines after Weight Loss Surgery  [email or web address]     Seated Exercises for Arms and Legs (can be done before or after surgery)  [email or web address]    Follow up: 1 month, prn    Time spent with patient: 8 minutes.  TORI BARROS RD, LD

## 2022-06-02 NOTE — PROGRESS NOTES
"Alcides Beauchamp is a 31 year old male who is being evaluated via a billable telephone visit.     The patient has been notified of following:     \"This telephone visit will be conducted via a call between you and your physician/provider. We have found that certain health care needs can be provided without the need for a physical exam.  This service lets us provide the care you need with a short phone conversation.  If a prescription is necessary we can send it directly to your pharmacy.  If lab work is needed we can place an order for that and you can then stop by our lab to have the test done at a later time.    Telephone visits are billed at different rates depending on your insurance coverage. During this emergency period, for some insurers they may be billed the same as an in-person visit.  Please reach out to your insurance provider with any questions.    If during the course of the call the physician/provider feels a telephone visit is not appropriate, you will not be charged for this service.\"    Patient has given verbal consent for Telephone visit?  Yes    How would you like to obtain your AVS? Mychart    Phone call duration: 8 minutes    During this virtual visit the patient is located in [place], patient verifies this as the location during the entirety of this visit.          Return Bariatric Nutrition Consultation Note    Reason For Visit: Nutrition Assessment    Alcides Beauchamp is a 31 year old presenting today for a return bariatric nutrition consult.   Pt is interested in laparoscopic sleeve gastrectomy or RNY with Dr. Sanchez expected surgery in [place].  Patient is accompanied by self.  This is pt's 3rd of 3 required nutrition visits prior to surgery.     Pt referred by ANITHA Johnson on February 15, 2022.  Patient with Co-morbidities of obesity including:  Type II DM no  Renal Failure no  Sleep apnea yes  Hypertension no   Dyslipidemia no  Joint pain no  Back pain no  GERD no     Support System Reviewed With " "Patient 10/21/2021   Who do you have in your support network that can be available to help you for the first 2 weeks after surgery? wife and family   Who can you count on for support throughout your weight loss surgery journey? wife and family       ANTHROPOMETRICS:  Estimated body mass index is 48.22 kg/m  as calculated from the following:    Height as of 4/27/22: 1.702 m (5' 7\").    Weight as of 4/27/22: 139.7 kg (307 lb 14.4 oz).     Current weight (6/2/22): 307 lbs per pt (has not checked recently)    Required weight loss goal pre-op: -20 lbs from initial consult weight (goal weight 287 lbs or less before surgery)       10/21/2021   I have tried the following methods to lose weight Weight Loss Surgery       Weight Loss Questions Reviewed With Patient 10/21/2021   How long have you been overweight? Since puberty       SUPPLEMENT INFORMATION:  Vitamin D    Saxenda    NUTRITION HISTORY:  Hx of lap band (2009). Has gained ~100 lbs in the last year. Pt has been struggling with consuming large amounts of \"junk\" food. Wife is not cooking d/t being pregnant with twins. Struggles with large portion sizes.     Pt waiting for Dr. Carnes to determine if gastric sleeve or RNY is the best option.    3/9/22: Pt states he has noticed Saxenda working. He has less cravings and a lower appetite. Unsure of best time to be taking it-has been taking right before bed. Dr. Sanchez would like to do an endoscopy before deciding which surgery to pursue. Overall has improved diet- focused on more protein and vegetables and less processed foods.    4/1/22: Pt reports he has stopped taking Saxenda d/t sleepiness and body aches. Trying to decrease carbohydrates in his meals. Still only eating one meal per day in the evenings and snacking occasionally. States he does not think he will be able to change his diet habits and would like to do a modified liquid diet prior to surgery to reach his goal weight.    5/2/22: Recently saw Dr." Trice and is going to move forward with surgery. Required to lose 20 lbs prior to surgery. Currently taking Saxenda and trying to have smaller portions. Pt wants to utilize the modified liquid diet once surgery date is closer.     6/2/22: Pt has noticed that medication has been working. Has reduced portion sizes. Wants to try modified liquid diet.      Recall Diet Questions Reviewed With Patient 10/21/2021   Describe what you typically consume for breakfast (typical or most recent): no breakfast   Describe what you typically consume for lunch (typical or most recent): fries burger ,pop   Describe what you typically consume for supper (typical or most recent): mozzrilla stick pop   Describe what you typically consume as snacks (typical or most recent): anything chips   How many ounces of water, or other low calorie drinks, do you drink daily (8 oz=1 glass)? 64 oz or more   How many ounces of caffeine (coffee, tea, pop) do you drink daily (8 oz=1 glass)? 32 oz   How many ounces of carbonated (pop, beer, sparkling water) drinks do you drinky daily (8 oz=1 glass)? 8 oz   How many ounces of juice, pop, sweet tea, sports drinks, protein drinks, other sweetened drinks, do you drink daily (8 oz=1 glass)? -   How many ounces of milk do you drink daily (8 oz=1 glass) 8 oz   Please indicate the type of milk: 1%   How often do you drink alcohol? Monthly or less   If you do drink alcohol, how many drinks might you have in a day? (one drink = 5 oz. wine, 1 can/bottle of beer, 1 shot liquor) 1 or 2       Eating Habits 10/21/2021   Do you have any dietary restrictions? No   Do you currently binge eat (eat a large amount of food in a short time)? No   Are you an emotional eater? No   Do you get up to eat after falling asleep? No   What foods do you crave? anything chocolate,chips     Progress on Previous Goals  1) Pre plan meals and snacks improving, continues  2) Eat 3 meals per day met, continues  3) Track 1900 calories using  [employer] Pal as able  4) Modified Liquid Diet (2 liquid meals, 1 meal, 2 snacks)  [email or web address]     ADDITIONAL INFORMATION:  Physical activity- moves around a lot at work    Dining Out History Reviewed With Patient 10/21/2021   How often do you dine out? A couple of times a week.   Where do you dine out? (select all that apply) sit-down restaurants, fast food chains   What types of food do you order when you dine out? steak burger potatoe pop       Physical Activity Reviewed With Patient 2/7/2022   How often do you exercise? Never   What is the duration of your exercise (in minutes)? -   What types of exercise do you do? -   What keeps you from being more active?  Lack of Time       NUTRITION DIAGNOSIS:  Obesity r/t long history of positive energy balance aeb BMI >30 kg/m2.    INTERVENTION:  Intervention Provided/Education Provided on post-op diet guidelines, vitamins/minerals essential post-operatively, GI anatomy of bariatric surgeries, ways to help prepare for post-op diet guidelines pre-operatively, portion/calorie-control, mindful eating and sources of protein.  Patient demonstrates understanding. Provided pt with list of goals RD contact information.      Questions Reviewed With Patient 10/21/2021   How ready are you to make changes regarding your weight? Number 1 = Not ready at all to make changes up to 10 = very ready. 10   How confident are you that you can change? 1 = Not confident that you will be successful making changes up to 10 = very confident. 10       Expected Engagement: good    GOALS:  1) Pre plan meals and snacks  2) Eat 3 meals per day   3) Track 1900 calories using [employer] Pal as able  4) Modified Liquid Diet (2 liquid meals, 1 meal, 2 snacks)  [email or web address]   5) Review handouts below    Your Stage 1 Diet: Clear Liquids  [email or web address]     Your Stage 2 Diet: Low-fat Full Liquids  [email or web address]     Your Stage 3 Diet: Pureed  Foods  [email or web address]     Pureed Pleasures  [email or web address]    Your Stage 4 Diet: Soft Foods  [email or web address]    Your Stage 5 Diet: Regular Foods  [email or web address]    Supplements after Weight Loss Surgery  [email or web address]     Protein Sources for Weight Loss  [email or web address]     Carbohydrates  [email or web address]     Mindful Eating  [email or web address]     Summary of Volumetrics Eating Plan  [email or web address]     Diet Guidelines after Weight Loss Surgery  [email or web address]     Seated Exercises for Arms and Legs (can be done before or after surgery)  [email or web address]    Follow up: 1 month, prn    Time spent with patient: 8 minutes.  TORI BARROS RD, LD

## 2022-06-03 NOTE — PATIENT INSTRUCTIONS
Hi Amr!    Follow-up with RD in 1 month    Thank you,    Zenia Cardona, RD, LD  If you would like to schedule or reschedule an appointment with the RD, please call [phone]    Nutrition Goals  1) Pre plan meals and snacks  2) Eat 3 meals per day   3) Track 1900 calories using [employer]olar Pal as able  4) Modified Liquid Diet (2 liquid meals, 1 meal, 2 snacks)  [email or web address]   5) Review handouts below    Your Stage 1 Diet: Clear Liquids  [email or web address]     Your Stage 2 Diet: Low-fat Full Liquids  [email or web address]     Your Stage 3 Diet: Pureed Foods  [email or web address]     Pureed Pleasures  [email or web address]    Your Stage 4 Diet: Soft Foods  [email or web address]    Your Stage 5 Diet: Regular Foods  [email or web address]    Supplements after Weight Loss Surgery  [email or web address]       Interested in working with a health ? Health coaches work with you to improve your overall health and wellbeing. They look at the whole person, and may involve discussion of different areas of life, including, but not limited to the four pillars of health (sleep, exercise, nutrition, and stress management). Discuss with your care team if you would like to start working a health .    Health Coaching-3 Pack:    $99 for three health coaching visits    Visits may be done in person or via phone    Coaching is a partnership between the  and the client; Coaches do not prescribe or diagnose    Coaching helps inspire the client to reach his/her personal goals    COMPREHENSIVE WEIGHT MANAGEMENT PROGRAM  VIRTUAL SUPPORT GROUPS    For Support Group Information:      We offer support groups for patients who are working on weight loss and considering, preparing for or have had weight loss surgery.   There is no cost for this opportunity.  You are invited to attend the?Virtual Support Groups?provided by any of the following  "locations:    [facility] via [employer] Teams with Amy Benavidez RN  2.   [place] via [employer] Teams with Donald Obrien, PhD, LP  3.   [place] via [employer] Teams with Cristin Baltazar RN  4.   [facility] via [employer] Teams with Cristin Belcher, [facility]    The following Support Group information can also be found on our website:  [email or web address]      [facility] Weight Loss Surgery Support Group    [facility] Weight Loss Surgery Support Group  The support group is a patient-lead forum that meets monthly to share experiences, encouragement and education. It is open to those who have had weight loss surgery, are scheduled for surgery, and those who are considering surgery.   WHEN: This group meets on the 3rd Wednesday of each month from 5:00PM - 6:00PM virtually using [employer] Teams.   FACILITATOR: Led by Amy Benavidez RD, WANDA, RN, the program's Clinical Coordinator.   TO REGISTER: Please contact the clinic via [employer] or call the nurse line directly at [phone] to inform our staff that you would like an invite sent to you and to let us know the email you would like the invite sent to. Prior to the meeting, a link with directions on how to join the meeting will be sent to you.    2022 Meetings  January 19: \"Let's Talk\" a time for the group to share.  February 16: \"Let's Talk\" a time for the group to share.  March 16: Guest Speakers: Psychologists, Lu Lees, PhD,LP and Marisol Quiles PsyD,LP  April 20: Guest Speaker: Health , Asmita Akhtar, [facility],CHES, CPT  May 18: Guest Speaker: Dietitian, Miguel Chambers, HAYES, LP  Mone 15: \"Let's Talk\" a time for the group to share.  July 20: \"Let's Talk\" a time for the group to share.  August 17: TBA  September 21: TBA  October 19: Guest Speaker: Dr Dale Remy MD Pulmonologist and Sleep Medicine Physician, \"Getting a Good Night's Sleep\".  November 16: TBA  December 21: TBA    [facility] " "[facility] Support Groups    Connections: Bariatric Care Support Group?  This is open to all [facility] (and those external to this program) pre- and post- operative bariatric surgery patients as well as their support system.   WHEN: This group meets the 2nd Tuesday of each month from 6:30 PM - 8:00 PM virtually using Microsoft Teams.   FACILITATOR: Led by Donald Obrien, Ph.D who is a Licensed Psychologist with the [facility] Comprehensive Weight Management Program.   TO REGISTER: Please send an email to Donald Obrien, Ph.D., LP at?[email or web address]?if you would like an invitation to the group and to learn about using [employer] Teams.    2022 Meetings  January 11: Tash Mendoza, PharmD, Pharmacy Resident at [facility], \"Medications and Bariatric Surgery\".  February 8: Open Forum  March 8  April 12  May 10  Mone 14    Connections: Post-Operative Bariatric Surgery Support Group  This is a support group for [facility] bariatric patients (and those external to [facility]) who have had bariatric surgery and are at least 3 months post-surgery.  WHEN: This support group meets the 4th Wednesday of the month from 11:00 AM - 12:00 PM virtually using Microsoft Teams.   FACILITATOR: Led by Certified Bariatric Nurse, Cristin Baltazar RN.   TO REGISTER: Please send an email to Cristin at [email or web address] if you would like an invitation to the group and to learn about using [employer] Teams.    2022 Meetings  January 26  February 23  March 23  April 27  May 25  Mone 22    [facility] Healthy Lifestyle Virtual Support Group    Healthy Lifestyle Virtual Support Group?  This is 60 minutes of small group guided discussion, support and resources. All are welcome who want a healthy lifestyle.  WHEN: This group meets monthly on a Friday from 12:30 PM - 1:30 PM virtually using Microsoft Teams.   FACILITATOR: Led by " "National Board Certified Health and , Cristin Belcher, [facility].   TO REGISTER: Please send an email to Cristin at?[email or web address] to receive monthly invites to the group or if you have any questions about having a health .  Prior to the meeting, a link with directions on how to join the meeting will be sent to you.    2022 Meetings  January 21: Antonella Domingo MS, RN, CIC, CBN, \"Healthy Habits\"  February 25: Open Forum  March 18: \"Setting Limits and Boundaries\"  April 29: Sajnuanita Royal RD, \"Meal Planning Made Easy\"  May 20: Open Forum  June: To be determined                "

## 2022-08-16 ENCOUNTER — VIRTUAL VISIT (OUTPATIENT)
Dept: NEUROPSYCHOLOGY | Facility: CLINIC | Age: 32
End: 2022-08-16
Payer: COMMERCIAL

## 2022-08-16 DIAGNOSIS — F54 PSYCHOLOGICAL FACTORS AFFECTING MEDICAL CONDITION: ICD-10-CM

## 2022-08-16 DIAGNOSIS — Z01.818 PREPROCEDURAL EXAMINATION: Primary | ICD-10-CM

## 2022-08-16 DIAGNOSIS — E66.01 MORBID OBESITY (H): ICD-10-CM

## 2022-08-16 PROCEDURE — 99207 PR INCOMPL DIAG INTERV-PSYCH TEST: CPT

## 2022-08-16 NOTE — PROGRESS NOTES
Alcides is a 32 year old who is being evaluated via a billable video visit.      How would you like to obtain your AVS? MyChart  If the video visit is dropped, the invitation should be resent by: Send to e-mail at: [email or web address]  Will anyone else be joining your video visit? No    Mr. Beauchamp completed the interview portion of his psychological evaluation remotely via video and telephone, and is scheduled to complete the testing portion tomorrow. Full report to follow.         Video-Visit Details    Video Start Time: 9:16 AM    Type of service:  Video Visit    Video End Time:9:50 AM     Originating Location (pt. Location): Home    Distant Location (provider location):  [facility] NEUROPSYCHOLOGY [place]     Platform used for Video Visit: [employer] (Switched to telephone)    .  ..

## 2022-08-17 ENCOUNTER — VIRTUAL VISIT (OUTPATIENT)
Dept: NEUROPSYCHOLOGY | Facility: CLINIC | Age: 32
End: 2022-08-17
Payer: COMMERCIAL

## 2022-08-17 DIAGNOSIS — E66.01 MORBID OBESITY (H): ICD-10-CM

## 2022-08-17 DIAGNOSIS — F54 PSYCHOLOGICAL FACTORS AFFECTING MEDICAL CONDITION: ICD-10-CM

## 2022-08-17 DIAGNOSIS — Z01.818 PREPROCEDURAL EXAMINATION: Primary | ICD-10-CM

## 2022-08-17 PROCEDURE — 96139 PSYCL/NRPSYC TST TECH EA: CPT | Mod: 95

## 2022-08-17 PROCEDURE — 96130 PSYCL TST EVAL PHYS/QHP 1ST: CPT | Mod: 95

## 2022-08-17 PROCEDURE — 90791 PSYCH DIAGNOSTIC EVALUATION: CPT | Mod: 95

## 2022-08-17 PROCEDURE — 96138 PSYCL/NRPSYC TECH 1ST: CPT | Mod: 95

## 2022-08-17 NOTE — LETTER
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 Information was obtained via video and telephone interview with the patient and review of his medical records. He has a history of  obesity, and is interested in undergoing bariatric surgery. This psychological evaluation was undertaken at the request of Buffy Forbes PA-C, as part of the presurgical protocol, to assess personality traits and emotional functioning, as they pertain to his ability to make well-reasoned medical decisions and follow through with treatment recommendations.     Precautions are in place related to COVID-19. While a video connection was established, the audio quality was poor, and ultimately, the evaluation was switched to telephone. He completed the interview on 8/16/2022, and the MMPI-3 using QGlobal, BDI-2, and JEVON on 8/17/2022 with telephone proctoring by a psychometrist.     EVALUATION FINDINGS    Behavioral observations were limited as the evaluation was conducted primarily over the telephone. He was having trouble with the internet connection initially, and went out to his car, which took 15 minutes. Once he arrived at his car, a video connection was established but the audio was quite poor, so the evaluation was converted to telephone, as noted. He then did not have time to complete the MMPI-3 because he had to go to work, so a second appointment was scheduled. Mood was euthymic. Speech was fluent and accented, with normal articulation, volume, and rate. He presented his thoughts in a clear, logical manner. Memory and attention appeared to be adequate. Judgment and insight appeared to be good.    Upon interview, Mr. Beauchamp stated that he underwent the Lap-Band procedure when he lived in [place] in 2009, at the age of 19.  He did quite well for many  years, but then developed a problem in his stomach and the band had to be removed.  He gained 100 pounds in one year, which was very difficult for him.  He is now interested in revision.  He understands that the surgeon would start with the sleeve gastrectomy, but that it may not work and if there are problems they would have to convert to Homero-en-Y gastric bypass.  He understands that leakage is a risk of the surgery.  He has spoken with his family about the procedure and they are supportive.  He lives with his wife and their 5-month-old twins.  His wife will be able to assist with his cares as necessary following the procedure.    As noted, Mr. Beauchamp has a longstanding history of obesity.  Currently, he weighs 317 pounds.  The most he ever weighed was 325 pounds.  He was asked to lose 20 pounds in preparation for surgery, in order to get to a goal weight of 300.  He has tried many diets and weight loss programs in addition to the Lap-Band procedure.  He first tried to lose weight around the age of 15.  He has tried intermittent fasting, calorie counting, and a ketogenic diet.  He stated that he loves food and that large portions are his biggest problem.  He used to eat a lot of sweets and sugar.  Now, he is eating 1-2 meals a day.  His meals consist of a protein and a carbohydrate such as rice or bread.  He is no longer snacking.  He drinks a Diet Coke or diet Pepsi daily, and may have a Monster or Red Bull, and one to two cups of coffee a day.  He stated that he was not aware that he would need to eliminate caffeine following the surgery but he feels confident about doing so.  He denied binging or purging.    Mr. Beauchamp reported a history of anxiety which has been managed with medications.  He has never been in counseling.  He is not feeling particularly anxious and feels that his stress level is normal and that he is good at dealing with stress.  He described himself as a happy person and is not feeling  depressed.  He sleeps well, 6 to 8 hours a night.  He has never been diagnosed with sleep apnea.  He naps for 30 minutes a day and feels refreshed afterwards.  His energy level seems lower, the more weight he gains.  Nonetheless, he stated that he remains very active.  He denied suicidal ideation or any history of suicide attempts.  He has not had visual or auditory hallucinations.  He has never been physically or sexually abused.    Every two or four weeks, Mr. Beauchamp may consume about two alcoholic beverages.  His score on the CAGE questionnaire was 0.  He denied illicit drug use.  He has never been in any chemical dependency treatment programs.  He vapes, and stated that he has cut down his vaping by 80%, and estimated that in a week he may have 1 or 2 puffs.  He stated that he no longer has a vape pen of his own.  He understands that he will need to stop vaping entirely for surgery.  He has never been arrested, does not chavez, and denied excessive spending or shopping.    Mr. Beauchamp was born in [place].  His first language was Georgian, but he learned English when he was younger.  He completed a bachelor's degree in [employer], in marketing.  [place] in [place].  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  He has been  for 6 years and as noted has a 5-month-old twins.    Mr. Beauchamp reported no history of head injury resulting loss of consciousness.  His balance has been good.  He has not had unilateral weakness or numbness.  He rarely experiences headaches.  He finds it harder to move as he gains weight, and has some general aches and pains.  He has not been to the emergency department in the last year.    The MMPI-3, a self-report measure of mood and personality, was administered remotely using Q-Global and video proctoring.  His English is fluent, and he indicated that he was comfortable reading in English. He responded to the items in a  consistent and valid manner. His level of emotional distress was low. He endorsed some unusual thought and perceptual experiences, such as feeling that he can read other people's minds, and seeing visions. He also reports engaging in physically aggressive behavior. He did not report depressed mood, ideation, or anxiety. On the BDI-2, and JEVON, he did not endorse any depressive symptomatology or anxiety.    PAST MEDICAL HISTORY: Medical records indicate a history of class 3 severe obesity, removal of a laparoscopic gastric banding device.     CURRENT MEDICATIONS:  Include duloxetine, hydroxyzine, ibuprofen, liraglutide, topiramate.    FAMILY MEDICAL HISTORY:  Significant for family members with anxiety.  He stated that he has no other family members with obesity.    CONCLUSIONS    Alcides Beauchamp is a 32 year old, [place] man with a history of obesity, who underwent a lap band procedure in [place] in 2009 at the age of 19.  Ten years later, the system was removed, and he has gained weight since then.  He is now interested in proceeding with revision to sleeve gastrectomy or Homero-en-Y gastric bypass.  Due to precautions related to COVID-19, this evaluation was undertaken remotely, primarily over the telephone since there were technical problems with the video connection.  He appears to be capable of comprehending medical information and making well reasoned decisions for himself. He has a good understanding of the surgical procedure and the risks involved.    Mr. Beauchamp reported a history of anxiety which has been well managed with medications.  Assessment of mood and personality was suggestive of unusual thoughts and perceptions, but not suggestive of significant depressed mood or anxiety. He does not appear to be experiencing emotional problems that might interfere with his judgment or ability to follow through treatment recommendations.  He denied disordered eating behaviors such as binging or purging.  He was asked  to lose 20 pounds in preparation for surgery, and estimates having lost around 3 pounds so far.  He consumes caffeine in various forms, including diet soda, Monster drinks and Red Bull, and coffee, and he was not aware that he would need to eliminate caffeine for surgery, but expressed a willingness to do so.  He continues to vape, and has cut back substantially.  He indicated that he has 1 or 2 puffs in a week now, and does not own his own vape pen.  We discussed the need to stop that entirely before surgery, and he may require further discussion with his surgery team.  He has a good support system in his family.  Once he has demonstrated an ability to make and maintain the behavioral changes necessary for surgery by losing the required weight, and eliminating caffeine and nicotine, then he appears to be an adequate candidate for surgery from a psychosocial perspective.      Little Armstrong, Ph.D., ABPP  Licensed Psychologist, LP [id number]  Board Certified in Clinical Neuropsychology    Time spent:  One hour professional time, including interview and biopsychosocial assessment (CPT 08694); One hour psychological testing evaluation services by a licensed and board-certified neuropsychologist, including integration of patient data, interpretation of standardized test results and clinical data, clinical decision making, treatment planning and report, first hour (CPT 82327); 1 unit psychological test administration and scoring by technician (CPT 92464). An additional 40 minutes (1 unit)  psychological test administration and scoring by technician (CPT 53013) on 8/16 and 8/17/2022. ICD-10 diagnoses: Z01.818; E66.01; F54.

## 2022-08-17 NOTE — NURSING NOTE
Pt was seen for neuropsychological evaluation at the request of Kameron Sanchez MD for the purposes of diagnostic clarification and treatment planning. 17 minutes of test administration and scoring were provided by this writer. Please see Dr. Little Armstrong's report for a full interpretation of the findings.    Phone Start 1: 9:14AM  Phone Stop 1: 9:20AM    Phone Start 2: 9:52AM  Phone Stop 2: 10:03AM  Zo Medina  Psychometrist

## 2022-08-17 NOTE — NURSING NOTE
Pt was seen for neuropsychological evaluation at the request of Kameron Sanchez MD for the purposes of diagnostic clarification and treatment planning. 53 minutes of test administration and scoring were provided by this writer. Please see Dr. Little Armstrong's report for a full interpretation of the findings.    Phone Start: 3:00PM  Phone Stop: 3:53PM    Zo Medina  Psychometrist

## 2022-08-22 NOTE — PROGRESS NOTES
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 Information was obtained via video and telephone interview with the patient and review of his medical records. He has a history of  obesity, and is interested in undergoing bariatric surgery. This psychological evaluation was undertaken at the request of Buffy Forbes PA-C, as part of the presurgical protocol, to assess personality traits and emotional functioning, as they pertain to his ability to make well-reasoned medical decisions and follow through with treatment recommendations.     Precautions are in place related to COVID-19. While a video connection was established, the audio quality was poor, and ultimately, the evaluation was switched to telephone. He completed the interview on 8/16/2022, and the MMPI-3 using QGlobal, BDI-2, and JEVON on 8/17/2022 with telephone proctoring by a psychometrist.     EVALUATION FINDINGS    Behavioral observations were limited as the evaluation was conducted primarily over the telephone. He was having trouble with the internet connection initially, and went out to his car, which took 15 minutes. Once he arrived at his car, a video connection was established but the audio was quite poor, so the evaluation was converted to telephone, as noted. He then did not have time to complete the MMPI-3 because he had to go to work, so a second appointment was scheduled. Mood was euthymic. Speech was fluent and accented, with normal articulation, volume, and rate. He presented his thoughts in a clear, logical manner. Memory and attention appeared to be adequate. Judgment and insight appeared to be good.    Upon interview, Mr. Beauchamp stated that he underwent the Lap-Band procedure when he lived in [place] in 2009, at the age of 19.  He did quite well for many years, but then developed a problem in his stomach and the band had to be  removed.  He gained 100 pounds in one year, which was very difficult for him.  He is now interested in revision.  He understands that the surgeon would start with the sleeve gastrectomy, but that it may not work and if there are problems they would have to convert to Homero-en-Y gastric bypass.  He understands that leakage is a risk of the surgery.  He has spoken with his family about the procedure and they are supportive.  He lives with his wife and their 5-month-old twins.  His wife will be able to assist with his cares as necessary following the procedure.    As noted, Mr. Beauchamp has a longstanding history of obesity.  Currently, he weighs 317 pounds.  The most he ever weighed was 325 pounds.  He was asked to lose 20 pounds in preparation for surgery, in order to get to a goal weight of 300.  He has tried many diets and weight loss programs in addition to the Lap-Band procedure.  He first tried to lose weight around the age of 15.  He has tried intermittent fasting, calorie counting, and a ketogenic diet.  He stated that he loves food and that large portions are his biggest problem.  He used to eat a lot of sweets and sugar.  Now, he is eating 1-2 meals a day.  His meals consist of a protein and a carbohydrate such as rice or bread.  He is no longer snacking.  He drinks a Diet Coke or diet Pepsi daily, and may have a Monster or Red Bull, and one to two cups of coffee a day.  He stated that he was not aware that he would need to eliminate caffeine following the surgery but he feels confident about doing so.  He denied binging or purging.    Mr. Beauchamp reported a history of anxiety which has been managed with medications.  He has never been in counseling.  He is not feeling particularly anxious and feels that his stress level is normal and that he is good at dealing with stress.  He described himself as a happy person and is not feeling depressed.  He sleeps well, 6 to 8 hours a night.  He has never been diagnosed  with sleep apnea.  He naps for 30 minutes a day and feels refreshed afterwards.  His energy level seems lower, the more weight he gains.  Nonetheless, he stated that he remains very active.  He denied suicidal ideation or any history of suicide attempts.  He has not had visual or auditory hallucinations.  He has never been physically or sexually abused.    Every two or four weeks, Mr. Beauchamp may consume about two alcoholic beverages.  His score on the CAGE questionnaire was 0.  He denied illicit drug use.  He has never been in any chemical dependency treatment programs.  He vapes, and stated that he has cut down his vaping by 80%, and estimated that in a week he may have 1 or 2 puffs.  He stated that he no longer has a vape pen of his own.  He understands that he will need to stop vaping entirely for surgery.  He has never been arrested, does not chavez, and denied excessive spending or shopping.    Mr. Beauchamp was born in [place].  His first language was Portuguese, but he learned English when he was younger.  He completed a bachelor's degree in [employer], in marketing.  [place] in [place].  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  He has been  for 6 years and as noted has a 5-month-old twins.    Mr. Beauchamp reported no history of head injury resulting loss of consciousness.  His balance has been good.  He has not had unilateral weakness or numbness.  He rarely experiences headaches.  He finds it harder to move as he gains weight, and has some general aches and pains.  He has not been to the emergency department in the last year.    The MMPI-3, a self-report measure of mood and personality, was administered remotely using Q-Global and video proctoring.  His English is fluent, and he indicated that he was comfortable reading in English. He responded to the items in a consistent and valid manner. His level of emotional distress was low. He  endorsed some unusual thought and perceptual experiences, such as feeling that he can read other people's minds, and seeing visions. He also reports engaging in physically aggressive behavior. He did not report depressed mood, ideation, or anxiety. On the BDI-2, and JEVON, he did not endorse any depressive symptomatology or anxiety.    PAST MEDICAL HISTORY: Medical records indicate a history of class 3 severe obesity, removal of a laparoscopic gastric banding device.     CURRENT MEDICATIONS:  Include duloxetine, hydroxyzine, ibuprofen, liraglutide, topiramate.    FAMILY MEDICAL HISTORY:  Significant for family members with anxiety.  He stated that he has no other family members with obesity.    CONCLUSIONS    Alcides Beauchamp is a 32 year old, [place]ag[place] man with a history of obesity, who underwent a lap band procedure in [place] in 2009 at the age of 19.  Ten years later, the system was removed, and he has gained weight since then.  He is now interested in proceeding with revision to sleeve gastrectomy or Homero-en-Y gastric bypass.  Due to precautions related to COVID-19, this evaluation was undertaken remotely, primarily over the telephone since there were technical problems with the video connection.  He appears to be capable of comprehending medical information and making well reasoned decisions for himself. He has a good understanding of the surgical procedure and the risks involved.    Mr. Beauchamp reported a history of anxiety which has been well managed with medications.  Assessment of mood and personality was suggestive of unusual thoughts and perceptions, but not suggestive of significant depressed mood or anxiety. He does not appear to be experiencing emotional problems that might interfere with his judgment or ability to follow through treatment recommendations.  He denied disordered eating behaviors such as binging or purging.  He was asked to lose 20 pounds in preparation for surgery, and estimates having lost  around 3 pounds so far.  He consumes caffeine in various forms, including diet soda, Monster drinks and Red Bull, and coffee, and he was not aware that he would need to eliminate caffeine for surgery, but expressed a willingness to do so.  He continues to vape, and has cut back substantially.  He indicated that he has 1 or 2 puffs in a week now, and does not own his own vape pen.  We discussed the need to stop that entirely before surgery, and he may require further discussion with his surgery team.  He has a good support system in his family.  Once he has demonstrated an ability to make and maintain the behavioral changes necessary for surgery by losing the required weight, and eliminating caffeine and nicotine, then he appears to be an adequate candidate for surgery from a psychosocial perspective.      Little Armstrong, Ph.D., ABPP  Licensed Psychologist, LP [id number]  Board Certified in Clinical Neuropsychology    Time spent:  One hour professional time, including interview and biopsychosocial assessment (CPT 78428); One hour psychological testing evaluation services by a licensed and board-certified neuropsychologist, including integration of patient data, interpretation of standardized test results and clinical data, clinical decision making, treatment planning and report, first hour (CPT 35128); 1 unit psychological test administration and scoring by technician (CPT 18303). An additional 40 minutes (1 unit)  psychological test administration and scoring by technician (CPT 38422) on 8/16 and 8/17/2022. ICD-10 diagnoses: Z01.818; E66.01; F54.

## 2022-10-03 ENCOUNTER — HEALTH MAINTENANCE LETTER (OUTPATIENT)
Age: 32
End: 2022-10-03

## 2022-10-05 ENCOUNTER — MEDICAL CORRESPONDENCE (OUTPATIENT)
Dept: HEALTH INFORMATION MANAGEMENT | Facility: CLINIC | Age: 32
End: 2022-10-05

## 2022-10-07 ENCOUNTER — VIRTUAL VISIT (OUTPATIENT)
Dept: ENDOCRINOLOGY | Facility: CLINIC | Age: 32
End: 2022-10-07
Payer: COMMERCIAL

## 2022-10-07 DIAGNOSIS — E66.9 OBESITY: ICD-10-CM

## 2022-10-07 DIAGNOSIS — Z71.3 NUTRITIONAL COUNSELING: Primary | ICD-10-CM

## 2022-10-07 PROCEDURE — 97803 MED NUTRITION INDIV SUBSEQ: CPT | Mod: 95 | Performed by: DIETITIAN, REGISTERED

## 2022-10-07 NOTE — PATIENT INSTRUCTIONS
Hi Amr!    Follow-up with RD in 1 month    Thank you,    Zenia Cardona, RD, LD  If you would like to schedule or reschedule an appointment with the RD, please call [phone]    Nutrition Goals  1) Modified Liquid Diet (2 liquid meals, 1 meal, 2 snacks)  [email or web address]     Meal Replacement Shake Options:   *Protein Shake Criteria: no more than 210 Calories, at least 20 grams of protein, and less than 10 grams of sugar   [facility] smoothie (160 Calories, 20 g protein)   Premier Protein (160 Calories, 30 g protein)  Slim Fast Advanced Nutrition (180 Calories, 20 g protein)  Muscle Milk, lactose-free, 17 oz bottle (210 Calories, 30 g protein)  Integrated Supplements, no artificial sugars (110 Calories, 20 g protein)  Genepro, unflavored protein powder (60 Calories, 30 g protein)  Boost/Ensure Max (160 calories, 30 gm protein)   [employer]life Core Power (170 calories, 26 gm protein)  Aldi's Elevation Protein Powder (180 calories, 30 gm protein)     Meal Replacement Bar Options:  [facility] Protein Shake (160 Calories, 15 g protein)  Quest Protein Bars (190 Calories, 20 g protein)  Built Bar (170 Calories, 15-20 g protein)  One Protein Bar (210 calories, 20 g protein)  [place] Signature Protein Bar ([employer]) (190 Calories, 21 g protein)  Pure Protein Bars (180 Calories, 21 g protein)    Low Calorie Frozen Meal:  Healthy Choice Power Bowls  Lean Cuisine  Smart Ones  Larry Fraire      Your Stage 1 Diet: Clear Liquids  [email or web address]     Your Stage 2 Diet: Low-fat Full Liquids  [email or web address]     Your Stage 3 Diet: Pureed Foods  [email or web address]     Pureed Pleasures  [email or web address]    Your Stage 4 Diet: Soft Foods  [email or web address]    Your Stage 5 Diet: Regular Foods  [email or web address]    Supplements after Weight Loss Surgery  [email or web address]     Protein Sources for Weight  Loss  [email or web address]     Carbohydrates  [email or web address]     Mindful Eating  [email or web address]     Summary of Volumetrics Eating Plan  [email or web address]     Diet Guidelines after Weight Loss Surgery  [email or web address]     Seated Exercises for Arms and Legs (can be done before or after surgery)  [email or web address]      Interested in working with a health ? Health coaches work with you to improve your overall health and wellbeing. They look at the whole person, and may involve discussion of different areas of life, including, but not limited to the four pillars of health (sleep, exercise, nutrition, and stress management). Discuss with your care team if you would like to start working a health .    Health Coaching-3 Pack:    $99 for three health coaching visits    Visits may be done in person or via phone    Coaching is a partnership between the  and the client; Coaches do not prescribe or diagnose    Coaching helps inspire the client to reach his/her personal goals    COMPREHENSIVE WEIGHT MANAGEMENT PROGRAM  VIRTUAL SUPPORT GROUPS    For Support Group Information:      We offer support groups for patients who are working on weight loss and considering, preparing for or have had weight loss surgery.   There is no cost for this opportunity.  You are invited to attend the?Virtual Support Groups?provided by any of the following locations:    [facility] via [employer] Teams with Amy Benavidez RN  2.   [facility] via [employer] Teams with Donald Obrien, PhD, LP  3.   [facility] via [employer] Teams with Cristin Baltazar RN  4.   [facility] via [employer] Teams with MA[facility]Z [facility]    The following Support Group information can also be found on our website:  [email or web address]      [facility] Weight Loss Surgery Support Group    [facility]  "Weight Loss Surgery Support Group  The support group is a patient-lead forum that meets monthly to share experiences, encouragement and education. It is open to those who have had weight loss surgery, are scheduled for surgery, and those who are considering surgery.   WHEN: This group meets on the 3rd Wednesday of each month from 5:00PM - 6:00PM virtually using [employer] Teams.   FACILITATOR: Led by Amy Benavidez RD, LD, RN, the program's Clinical Coordinator.   TO REGISTER: Please contact the clinic via [employer] or call the nurse line directly at [phone] to inform our staff that you would like an invite sent to you and to let us know the email you would like the invite sent to. Prior to the meeting, a link with directions on how to join the meeting will be sent to you.    2022 Meetings  January 19: \"Let's Talk\" a time for the group to share.  February 16: \"Let's Talk\" a time for the group to share.  March 16: Guest Speakers: Psychologists, Lu Lees, PhD,LP and Marisol Quiles PsyD,  April 20: Guest Speaker: [facility], [facility],CHES, CPT  May 18: Guest Speaker: DietitianMiguel, HAYES, LP  Mone 15: \"Let's Talk\" a time for the group to share.  July 20: \"Let's Talk\" a time for the group to share.  August 17: TBA  September 21: TBA  October 19: Guest Speaker: Dr Dale Remy MD Pulmonologist and Sleep Medicine Physician, \"Getting a Good Night's Sleep\".  November 16: TBA  December 21: TBA    [facility] Clin[facility] and [facility] Support Groups    Connections: Bariatric Care Support Group?  This is open to all [facility] (and those external to this program) pre- and post- operative bariatric surgery patients as well as their support system.   WHEN: This group meets the 2nd Tuesday of each month from 6:30 PM - 8:00 PM virtually using Microsoft Teams.   FACILITATOR: Led by Donald Obrien, Ph.D who is a Licensed Psychologist with the [facility] Comprehensive Weight " "Management Program.   TO REGISTER: Please send an email to Donald Obrien, Ph.D., LP at?[email or web address]?if you would like an invitation to the group and to learn about using Microsoft Teams.    2022 Meetings January 11: Tash Mendoza, PharmD, Pharmacy Resident at [facility], \"Medications and Bariatric Surgery\".  February 8: Open Forum  March 8  April 12  May 10  Mone 14    Connections: Post-Operative Bariatric Surgery Support Group  This is a support group for [facility] bariatric patients (and those external to [facility]) who have had bariatric surgery and are at least 3 months post-surgery.  WHEN: This support group meets the 4th Wednesday of the month from 11:00 AM - 12:00 PM virtually using Microsoft Teams.   FACILITATOR: Led by Certified Bariatric Nurse, Cristin Baltazar RN.   TO REGISTER: Please send an email to Cristin at [email or web address] if you would like an invitation to the group and to learn about using Microsoft Teams.    2022 Meetings  January 26  February 23  March 23  April 27  May 25  Mone 22    [facility] Healthy Lifestyle Virtual Support Group    Healthy Lifestyle Virtual Support Group?  This is 60 minutes of small group guided discussion, support and resources. All are welcome who want a healthy lifestyle.  WHEN: This group meets monthly on a Friday from 12:30 PM - 1:30 PM virtually using Microsoft Teams.   FACILITATOR: Led by National Board Certified Health and , Cristin Belcher [facility].   TO REGISTER: Please send an email to Cristin [email or web address] to receive monthly invites to the group or if you have any questions about having a health .  Prior to the meeting, a link with directions on how to join the meeting will be sent to you.    2022 Meetings January 21: Antonella Domingo MS, RN, CIC, CBN, \"Healthy Habits\"  February 25: Open Forum  March 18: \"Setting Limits and Boundaries\"  April 29: Sanjuanita " "Lele RD, \"Meal Planning Made Easy\"  May 20: Open Forum  Mone: To be determined                "

## 2022-10-07 NOTE — PROGRESS NOTES
"Alcides Beauchamp is a 31 year old male who is being evaluated via a billable telephone visit.     The patient has been notified of following:     \"This telephone visit will be conducted via a call between you and your physician/provider. We have found that certain health care needs can be provided without the need for a physical exam.  This service lets us provide the care you need with a short phone conversation.  If a prescription is necessary we can send it directly to your pharmacy.  If lab work is needed we can place an order for that and you can then stop by our lab to have the test done at a later time.    Telephone visits are billed at different rates depending on your insurance coverage. During this emergency period, for some insurers they may be billed the same as an in-person visit.  Please reach out to your insurance provider with any questions.    If during the course of the call the physician/provider feels a telephone visit is not appropriate, you will not be charged for this service.\"    Patient has given verbal consent for Telephone visit?  Yes    How would you like to obtain your AVS? Mychart    Phone call duration: 11 minutes    During this virtual visit the patient is located in [place], patient verifies this as the location during the entirety of this visit.        Return Bariatric Nutrition Consultation Note    Reason For Visit: Nutrition Assessment    Alcides Beauchamp is a 31 year old presenting today for a return bariatric nutrition consult.   Pt is interested in laparoscopic sleeve gastrectomy or RNY with Dr. Sanchez expected surgery in [place].  Patient is accompanied by self.  Pt has met required nutrition visits prior to surgery.     Pt referred by ANITHA Johnson on February 15, 2022.  Patient with Co-morbidities of obesity including:  Type II DM no  Renal Failure no  Sleep apnea yes  Hypertension no   Dyslipidemia no  Joint pain no  Back pain no  GERD no     Support System Reviewed With Patient " "10/21/2021   Who do you have in your support network that can be available to help you for the first 2 weeks after surgery? wife and family   Who can you count on for support throughout your weight loss surgery journey? wife and family       ANTHROPOMETRICS:  Estimated body mass index is 48.22 kg/m  as calculated from the following:    Height as of 4/27/22: 1.702 m (5' 7\").    Weight as of 4/27/22: 139.7 kg (307 lb 14.4 oz).     Current weight (6/2/22): 314 lbs per pt (has not checked recently)    Required weight loss goal pre-op: -20 lbs from initial consult weight (goal weight 287 lbs or less before surgery)       10/21/2021   I have tried the following methods to lose weight Weight Loss Surgery       Weight Loss Questions Reviewed With Patient 10/21/2021   How long have you been overweight? Since puberty       SUPPLEMENT INFORMATION:  Vitamin D    Saxenda    NUTRITION HISTORY:  Hx of lap band (2009). Has gained ~100 lbs in the last year. Pt has been struggling with consuming large amounts of \"junk\" food. Wife is not cooking d/t being pregnant with twins. Struggles with large portion sizes.     Pt waiting for Dr. Carnes to determine if gastric sleeve or RNY is the best option.    3/9/22: Pt states he has noticed Saxenda working. He has less cravings and a lower appetite. Unsure of best time to be taking it-has been taking right before bed. Dr. Sanchez would like to do an endoscopy before deciding which surgery to pursue. Overall has improved diet- focused on more protein and vegetables and less processed foods.    4/1/22: Pt reports he has stopped taking Saxenda d/t sleepiness and body aches. Trying to decrease carbohydrates in his meals. Still only eating one meal per day in the evenings and snacking occasionally. States he does not think he will be able to change his diet habits and would like to do a modified liquid diet prior to surgery to reach his goal weight.    5/2/22: Recently saw Dr. Carnes " "and is going to move forward with surgery. Required to lose 20 lbs prior to surgery. Currently taking Saxenda and trying to have smaller portions. Pt wants to utilize the modified liquid diet once surgery date is closer.     6/2/22: Pt has noticed that medication has been working. Has reduced portion sizes. Wants to try modified liquid diet.    10/7/22: Pt has not been doing well with weight loss goals recently. Has gotten \"off track\" and has not been focusing on healthy meals. Was not able to start modified liquid diet but plans to this month.      Recall Diet Questions Reviewed With Patient 10/21/2021   Describe what you typically consume for breakfast (typical or most recent): no breakfast   Describe what you typically consume for lunch (typical or most recent): fries burger ,pop   Describe what you typically consume for supper (typical or most recent): mozzrilla stick pop   Describe what you typically consume as snacks (typical or most recent): anything chips   How many ounces of water, or other low calorie drinks, do you drink daily (8 oz=1 glass)? 64 oz or more   How many ounces of caffeine (coffee, tea, pop) do you drink daily (8 oz=1 glass)? 32 oz   How many ounces of carbonated (pop, beer, sparkling water) drinks do you drinky daily (8 oz=1 glass)? 8 oz   How many ounces of juice, pop, sweet tea, sports drinks, protein drinks, other sweetened drinks, do you drink daily (8 oz=1 glass)? -   How many ounces of milk do you drink daily (8 oz=1 glass) 8 oz   Please indicate the type of milk: 1%   How often do you drink alcohol? Monthly or less   If you do drink alcohol, how many drinks might you have in a day? (one drink = 5 oz. wine, 1 can/bottle of beer, 1 shot liquor) 1 or 2       Eating Habits 10/21/2021   Do you have any dietary restrictions? No   Do you currently binge eat (eat a large amount of food in a short time)? No   Are you an emotional eater? No   Do you get up to eat after falling asleep? No   What " foods do you crave? anything chocolate,chips     Progress on Previous Goals  1) Pre plan meals and snacks  2) Eat 3 meals per day   3) Track 1900 calories using Pantry Pal as able  4) Modified Liquid Diet (2 liquid meals, 1 meal, 2 snacks)  [email or web address]   5) Review handouts below    ADDITIONAL INFORMATION:  Physical activity- moves around a lot at work    Dining Out History Reviewed With Patient 10/21/2021   How often do you dine out? A couple of times a week.   Where do you dine out? (select all that apply) sit-down restaurants, fast food chains   What types of food do you order when you dine out? steak burger potatoe pop       Physical Activity Reviewed With Patient 2/7/2022   How often do you exercise? Never   What is the duration of your exercise (in minutes)? -   What types of exercise do you do? -   What keeps you from being more active?  Lack of Time       NUTRITION DIAGNOSIS:  Obesity r/t long history of positive energy balance aeb BMI >30 kg/m2.    INTERVENTION:  Intervention Provided/Education Provided on post-op diet guidelines, vitamins/minerals essential post-operatively, GI anatomy of bariatric surgeries, ways to help prepare for post-op diet guidelines pre-operatively, portion/calorie-control, mindful eating and sources of protein.  Patient demonstrates understanding. Provided pt with list of goals RD contact information.      Questions Reviewed With Patient 10/21/2021   How ready are you to make changes regarding your weight? Number 1 = Not ready at all to make changes up to 10 = very ready. 10   How confident are you that you can change? 1 = Not confident that you will be successful making changes up to 10 = very confident. 10       Expected Engagement: good    GOALS:   1) Modified Liquid Diet (2 liquid meals, 1 meal, 2 snacks)  [email or web address]     Meal Replacement Shake Options:   *Protein Shake Criteria: no more than 210 Calories, at least 20 grams of protein,  and less than 10 grams of sugar   [facility] smoothie (160 Calories, 20 g protein)   Premier Protein (160 Calories, 30 g protein)  Slim Fast Advanced Nutrition (180 Calories, 20 g protein)  Muscle Milk, lactose-free, 17 oz bottle (210 Calories, 30 g protein)  Integrated Supplements, no artificial sugars (110 Calories, 20 g protein)  Genepro, unflavored protein powder (60 Calories, 30 g protein)  Boost/Ensure Max (160 calories, 30 gm protein)   [facility] Core Power (170 calories, 26 gm protein)  Aldi's Elevation Protein Powder (180 calories, 30 gm protein)     Meal Replacement Bar Options:  [facility] Protein Shake (160 Calories, 15 g protein)  Quest Protein Bars (190 Calories, 20 g protein)  Built Bar (170 Calories, 15-20 g protein)  One Protein Bar (210 calories, 20 g protein)  [place] Signature Protein Bar ([employer]) (190 Calories, 21 g protein)  Pure Protein Bars (180 Calories, 21 g protein)    Low Calorie Frozen Meal:  Healthy Choice Power Bowls  Lean Cuisine  Smart Ones  Larry Fraire      Your Stage 1 Diet: Clear Liquids  [email or web address]     Your Stage 2 Diet: Low-fat Full Liquids  [email or web address]     Your Stage 3 Diet: Pureed Foods  [email or web address]     Pureed Pleasures  [email or web address]://"[email or web address]    Your Stage 4 Diet: Soft Foods  [email or web address]    Your Stage 5 Diet: Regular Foods  [email or web address]    Supplements after Weight Loss Surgery  [email or web address]://"[email or web address]     Protein Sources for Weight Loss  [email or web address]     Carbohydrates  [email or web address]     Mindful Eating  [email or web address]://"[employer]"/[email or web address]     Summary of Volumetrics Eating Plan  [email or web address]     Diet Guidelines after Weight Loss Surgery  [email or web address]     Seated Exercises for Arms and Legs (can be done before or after surgery)  [email or web address]    Follow up: 1 month, prn    Time spent with  patient: 11 minutes.  TORI BARROS RD, LD

## 2022-10-07 NOTE — LETTER
"10/7/2022       RE: Alcides Beauchamp  [address]  [facility] [address]     Dear Colleague,    Thank you for referring your patient, Alcides Beauchamp, to the [facility]IGHT MANAGEMENT [facility]INIC [place] at [facility]. Please see a copy of my visit note below.    Alcides Beauchamp is a 31 year old male who is being evaluated via a billable telephone visit.     The patient has been notified of following:     \"This telephone visit will be conducted via a call between you and your physician/provider. We have found that certain health care needs can be provided without the need for a physical exam.  This service lets us provide the care you need with a short phone conversation.  If a prescription is necessary we can send it directly to your pharmacy.  If lab work is needed we can place an order for that and you can then stop by our lab to have the test done at a later time.    Telephone visits are billed at different rates depending on your insurance coverage. During this emergency period, for some insurers they may be billed the same as an in-person visit.  Please reach out to your insurance provider with any questions.    If during the course of the call the physician/provider feels a telephone visit is not appropriate, you will not be charged for this service.\"    Patient has given verbal consent for Telephone visit?  Yes    How would you like to obtain your AVS? [place]    Phone call duration: 11 minutes    During this virtual visit the patient is located in [place], patient verifies this as the location during the entirety of this visit.        Return Bariatric Nutrition Consultation Note    Reason For Visit: Nutrition Assessment    Alcides Beauchamp is a 31 year old presenting today for a return bariatric nutrition consult.   Pt is interested in laparoscopic sleeve gastrectomy or RNY with Dr. Sanchez expected surgery in TBD.  Patient is accompanied by self.  Pt has met required " "nutrition visits prior to surgery.     Pt referred by ANITHA Johnson on February 15, 2022.  Patient with Co-morbidities of obesity including:  Type II DM no  Renal Failure no  Sleep apnea yes  Hypertension no   Dyslipidemia no  Joint pain no  Back pain no  GERD no     Support System Reviewed With Patient 10/21/2021   Who do you have in your support network that can be available to help you for the first 2 weeks after surgery? wife and family   Who can you count on for support throughout your weight loss surgery journey? wife and family       ANTHROPOMETRICS:  Estimated body mass index is 48.22 kg/m  as calculated from the following:    Height as of 4/27/22: 1.702 m (5' 7\").    Weight as of 4/27/22: 139.7 kg (307 lb 14.4 oz).     Current weight (6/2/22): 314 lbs per pt (has not checked recently)    Required weight loss goal pre-op: -20 lbs from initial consult weight (goal weight 287 lbs or less before surgery)       10/21/2021   I have tried the following methods to lose weight Weight Loss Surgery       Weight Loss Questions Reviewed With Patient 10/21/2021   How long have you been overweight? Since puberty       SUPPLEMENT INFORMATION:  Vitamin D    Saxenda    NUTRITION HISTORY:  Hx of lap band (2009). Has gained ~100 lbs in the last year. Pt has been struggling with consuming large amounts of \"junk\" food. Wife is not cooking d/t being pregnant with twins. Struggles with large portion sizes.     Pt waiting for Dr. Carnes to determine if gastric sleeve or RNY is the best option.    3/9/22: Pt states he has noticed Saxenda working. He has less cravings and a lower appetite. Unsure of best time to be taking it-has been taking right before bed. Dr. Sanchez would like to do an endoscopy before deciding which surgery to pursue. Overall has improved diet- focused on more protein and vegetables and less processed foods.    4/1/22: Pt reports he has stopped taking Saxenda d/t sleepiness and body aches. Trying to " "decrease carbohydrates in his meals. Still only eating one meal per day in the evenings and snacking occasionally. States he does not think he will be able to change his diet habits and would like to do a modified liquid diet prior to surgery to reach his goal weight.    5/2/22: Recently saw Dr. Carnes and is going to move forward with surgery. Required to lose 20 lbs prior to surgery. Currently taking Saxenda and trying to have smaller portions. Pt wants to utilize the modified liquid diet once surgery date is closer.     6/2/22: Pt has noticed that medication has been working. Has reduced portion sizes. Wants to try modified liquid diet.    10/7/22: Pt has not been doing well with weight loss goals recently. Has gotten \"off track\" and has not been focusing on healthy meals. Was not able to start modified liquid diet but plans to this month.      Recall Diet Questions Reviewed With Patient 10/21/2021   Describe what you typically consume for breakfast (typical or most recent): no breakfast   Describe what you typically consume for lunch (typical or most recent): fries burger ,pop   Describe what you typically consume for supper (typical or most recent): mozzrilla stick pop   Describe what you typically consume as snacks (typical or most recent): anything chips   How many ounces of water, or other low calorie drinks, do you drink daily (8 oz=1 glass)? 64 oz or more   How many ounces of caffeine (coffee, tea, pop) do you drink daily (8 oz=1 glass)? 32 oz   How many ounces of carbonated (pop, beer, sparkling water) drinks do you drinky daily (8 oz=1 glass)? 8 oz   How many ounces of juice, pop, sweet tea, sports drinks, protein drinks, other sweetened drinks, do you drink daily (8 oz=1 glass)? -   How many ounces of milk do you drink daily (8 oz=1 glass) 8 oz   Please indicate the type of milk: 1%   How often do you drink alcohol? Monthly or less   If you do drink alcohol, how many drinks might you have in a day? " (one drink = 5 oz. wine, 1 can/bottle of beer, 1 shot liquor) 1 or 2       Eating Habits 10/21/2021   Do you have any dietary restrictions? No   Do you currently binge eat (eat a large amount of food in a short time)? No   Are you an emotional eater? No   Do you get up to eat after falling asleep? No   What foods do you crave? anything chocolate,chips     Progress on Previous Goals  1) Pre plan meals and snacks  2) Eat 3 meals per day   3) Track 1900 calories using [employer] as able  4) Modified Liquid Diet (2 liquid meals, 1 meal, 2 snacks)  [email or web address]   5) Review handouts below    ADDITIONAL INFORMATION:  Physical activity- moves around a lot at work    Dining Out History Reviewed With Patient 10/21/2021   How often do you dine out? A couple of times a week.   Where do you dine out? (select all that apply) sit-down restaurants, fast food chains   What types of food do you order when you dine out? steak burger potatoe pop       Physical Activity Reviewed With Patient 2/7/2022   How often do you exercise? Never   What is the duration of your exercise (in minutes)? -   What types of exercise do you do? -   What keeps you from being more active?  Lack of Time       NUTRITION DIAGNOSIS:  Obesity r/t long history of positive energy balance aeb BMI >30 kg/m2.    INTERVENTION:  Intervention Provided/Education Provided on post-op diet guidelines, vitamins/minerals essential post-operatively, GI anatomy of bariatric surgeries, ways to help prepare for post-op diet guidelines pre-operatively, portion/calorie-control, mindful eating and sources of protein.  Patient demonstrates understanding. Provided pt with list of goals RD contact information.      Questions Reviewed With Patient 10/21/2021   How ready are you to make changes regarding your weight? Number 1 = Not ready at all to make changes up to 10 = very ready. 10   How confident are you that you can change? 1 = Not confident that you will be  successful making changes up to 10 = very confident. 10       Expected Engagement: good    GOALS:   1) Modified Liquid Diet (2 liquid meals, 1 meal, 2 snacks)  [email or web address]     Meal Replacement Shake Options:   *Protein Shake Criteria: no more than 210 Calories, at least 20 grams of protein, and less than 10 grams of sugar   [facility] smoothie (160 Calories, 20 g protein)   Premier Protein (160 Calories, 30 g protein)  Slim Fast Advanced Nutrition (180 Calories, 20 g protein)  Muscle Milk, lactose-free, 17 oz bottle (210 Calories, 30 g protein)  Integrated Supplements, no artificial sugars (110 Calories, 20 g protein)  Genepro, unflavored protein powder (60 Calories, 30 g protein)  Boost/Ensure Max (160 calories, 30 gm protein)   Fairlife Core Power (170 calories, 26 gm protein)  Aldi's Elevation Protein Powder (180 calories, 30 gm protein)     Meal Replacement Bar Options:  [facility] Protein Shake (160 Calories, 15 g protein)  Quest Protein Bars (190 Calories, 20 g protein)  Built Bar (170 Calories, 15-20 g protein)  One Protein Bar (210 calories, 20 g protein)  [place] Signature Protein Bar ([employer]) (190 Calories, 21 g protein)  Pure Protein Bars (180 Calories, 21 g protein)    Low Calorie Frozen Meal:  Healthy Choice Power Bowls  Lean Cuisine  Smart Ones  Larry Fraire      Your Stage 1 Diet: Clear Liquids  [email or web address]     Your Stage 2 Diet: Low-fat Full Liquids  [email or web address]     Your Stage 3 Diet: Pureed Foods  [email or web address]     Pureed Pleasures  [email or web address]    Your Stage 4 Diet: Soft Foods  [email or web address]    Your Stage 5 Diet: Regular Foods  [email or web address]    Supplements after Weight Loss Surgery  [email or web address]     Protein Sources for Weight Loss  [email or web address]     Carbohydrates  [email or web address]     Mindful  Eating  [email or web address]     Summary of Volumetrics Eating Plan  [email or web address]     Diet Guidelines after Weight Loss Surgery  [email or web address]     Seated Exercises for Arms and Legs (can be done before or after surgery)  [email or web address]    Follow up: 1 month, prn    Time spent with patient: 11 minutes.  TORI BARROS RD, LD

## 2022-10-17 ENCOUNTER — CARE COORDINATION (OUTPATIENT)
Dept: ENDOCRINOLOGY | Facility: CLINIC | Age: 32
End: 2022-10-17

## 2022-10-17 DIAGNOSIS — E66.01 MORBID OBESITY (H): Primary | ICD-10-CM

## 2022-10-17 DIAGNOSIS — Z72.0 TOBACCO ABUSE: ICD-10-CM

## 2022-10-17 NOTE — PROGRESS NOTES
Tasklist updated, reviewed with wife over the phone as per patient request and sent to patient via [employer].    Bariatric Task List  Fax:  Please fax all paperwork to: [phone] -     Status:  Is patient a candidate for bariatric surgery?:  patient is a candidate for bariatric surgery -     Cleared to schedule surgeon consult?:  cleared to schedule surgeon consult - 4/27/22 appt. Call [phone] to see agoin prior to surgery (within 6 months). [facility]   Status:  surgery evaluation in process -     Surgeon: Daniel -     Tentative surgery month/year: To be determined. -        Insurance: Insurance:  [facility] -      Contact insurance to discuss coverage: Completed -   Collins confirmed with insurance. To submit documentation. [facility]      Patient Info: Initial Weight:  307 - Per Dr Sanchez's note.s   Date of Initial Weight/Height:  4/27/2022 -     Goal Weight (lbs):  287 - Per Dr Sanchez's note. [facility]   Required Weight Loss:  20 lb -     Surgery Type:  other bariatric surgery - Possible sleeve gastrectomy or possible brunilda-en-Y gastric bypass (post gastric band removal in 2020), hiatal hernia repair      Dietician Visits: Structured weight loss required by insurance?:  structured weight loss required -     Dietician Visit 1:  Completed - 2/16/22. s   Dietician Visit 2:  Completed - 3/9/22. s   Dietician Visit 3:  Completed - 4/1/22. s   Dietician Visit 4:  Completed - 5/2/22 bks   Dietician Visit 5:  Completed - 6/2/22 appt. [facility]   Dietician Visit 6:  Completed - 10/7/22. [facility]   Dietician Visit additional:  Needed - Monthly until surgery for weight loss and postop diet teaching. [facility]      Psychological Evaluation: Psych eval:  Completed - List and letter sent to pt 5/12/22 - AS; 8/16/22 Dr Armstrong, cleared when off nicotine and caffeine and at goal weight of 287 lbs. [facility]      Lab Work: Complete Blood Count:  Needed - 10/22/21. [facility]   Comprehensive Metabolic Panel:  Needed - 10/22/21. [facility]   Vitamin D:  Needed -  "10/22/21 =12, recheck. bks   PTH:  Needed - 10/22/21 bks   Hgb A1c:  Needed - 10/22/21. bks    Lipids: Needed - 10/22/21  bks   Nicotine Testing:  Needed - Check level 1 month after quitting all nicotine products. Surgery can be 2 months after a negative level. bks      Testing: EGD:  Completed - 4/13/22. bks      PCP: Establish care with PCP:  Completed -     PCP letter of support:  Completed - 10/5/22 Sanjay Farrell MD - in Media section. bks      Stopping Smoking/ Alcohol Use: Quit tobacco use (3 months smoke free)?:  Needed - Smoking cessation information sent to pt 5/12/22 - AS   Quit date:    - Check level one month off of all smoking/nicotine products. bks      Patient Education:  Information Session:  Needed -     Given \"Making your decision\" handout?:  Yes - 5/12/22 - AS   Given \"A Roadmap to you Weight Loss Surgery\" handout?: Yes - 5/12/22 - AS   Given \"Get Well Loop\" information?: Yes - 5/12/22 - AS   Given support group information?:    -     Attended support group?:  Needed -     Support plan in place?:  Completed - Yes. Wife. bks      Additional Surgery Requirements: Final nicotine screen:  Needed - 1 month after smoking cessation. - AS      Final Tasks:  Before surgery online class:  Needed -     Before surgery online class website link:  [email or web address]   After surgery online class:  Needed -     After surgery online class website link:  [email or web address]   Nurse visit per clinic:  Needed -     History and Physical per clinic:   -     Final labs per clinic: Needed -        Notes: Please register for the Sleeve Gastrectomy Get Well Loop when you get an email invitation after you get a surgery date.   The Get Well Loop will give you information via email or text messages that can help you be more successful before and after surgery for up to one year after surgery.  It can also help answer any questions you may have.   Get Well Loop Handout - " [email or web address]   -

## 2022-11-14 ENCOUNTER — VIRTUAL VISIT (OUTPATIENT)
Dept: ENDOCRINOLOGY | Facility: CLINIC | Age: 32
End: 2022-11-14
Payer: COMMERCIAL

## 2022-11-14 DIAGNOSIS — E66.9 OBESITY: ICD-10-CM

## 2022-11-14 DIAGNOSIS — Z71.3 NUTRITIONAL COUNSELING: Primary | ICD-10-CM

## 2022-11-14 PROCEDURE — 97803 MED NUTRITION INDIV SUBSEQ: CPT | Mod: 95 | Performed by: DIETITIAN, REGISTERED

## 2022-11-14 NOTE — LETTER
"11/14/2022       RE: Alcides Beauchamp  [address]  [facility] [address]     Dear Colleague,    Thank you for referring your patient, Alcides Beauchamp, to the [facility] WEIGHT [facility]NAGEMENT CLINIC [place] at [facility]. Please see a copy of my visit note below.    Alcides Beauchamp is a 31 year old male who is being evaluated via a billable telephone visit.     The patient has been notified of following:     \"This telephone visit will be conducted via a call between you and your physician/provider. We have found that certain health care needs can be provided without the need for a physical exam.  This service lets us provide the care you need with a short phone conversation.  If a prescription is necessary we can send it directly to your pharmacy.  If lab work is needed we can place an order for that and you can then stop by our lab to have the test done at a later time.    Telephone visits are billed at different rates depending on your insurance coverage. During this emergency period, for some insurers they may be billed the same as an in-person visit.  Please reach out to your insurance provider with any questions.    If during the course of the call the physician/provider feels a telephone visit is not appropriate, you will not be charged for this service.\"    Patient has given verbal consent for Telephone visit?  Yes    How would you like to obtain your AVS? Linda    Phone call duration: 9 minutes    During this virtual visit the patient is located in [place], patient verifies this as the location during the entirety of this visit.        Return Bariatric Nutrition Consultation Note    Reason For Visit: Nutrition Assessment    Alcides Beauchamp is a 31 year old presenting today for a return bariatric nutrition consult.   Pt is interested in laparoscopic sleeve gastrectomy or RNY with Dr. Sanchez expected surgery in TBD.  Patient is accompanied by self.  Pt has met required " "nutrition visits prior to surgery.     Pt referred by ANITHA Johnson on February 15, 2022.  Patient with Co-morbidities of obesity including:  Type II DM no  Renal Failure no  Sleep apnea yes  Hypertension no   Dyslipidemia no  Joint pain no  Back pain no  GERD no     Support System Reviewed With Patient 10/21/2021   Who do you have in your support network that can be available to help you for the first 2 weeks after surgery? wife and family   Who can you count on for support throughout your weight loss surgery journey? wife and family       ANTHROPOMETRICS:  Estimated body mass index is 48.22 kg/m  as calculated from the following:    Height as of 4/27/22: 1.702 m (5' 7\").    Weight as of 4/27/22: 139.7 kg (307 lb 14.4 oz).     Current weight (11/14/22): 307 lbs per pt     Required weight loss goal pre-op: -20 lbs from initial consult weight (goal weight 287 lbs or less before surgery)       10/21/2021   I have tried the following methods to lose weight Weight Loss Surgery       Weight Loss Questions Reviewed With Patient 10/21/2021   How long have you been overweight? Since puberty       SUPPLEMENT INFORMATION:  Vitamin D    Saxenda    NUTRITION HISTORY:  Hx of lap band (2009). Has gained ~100 lbs in the last year. Pt has been struggling with consuming large amounts of \"junk\" food. Wife is not cooking d/t being pregnant with twins. Struggles with large portion sizes.     Pt waiting for Dr. Carnes to determine if gastric sleeve or RNY is the best option.    3/9/22: Pt states he has noticed Saxenda working. He has less cravings and a lower appetite. Unsure of best time to be taking it-has been taking right before bed. Dr. Sanchez would like to do an endoscopy before deciding which surgery to pursue. Overall has improved diet- focused on more protein and vegetables and less processed foods.    4/1/22: Pt reports he has stopped taking Saxenda d/t sleepiness and body aches. Trying to decrease carbohydrates in " "his meals. Still only eating one meal per day in the evenings and snacking occasionally. States he does not think he will be able to change his diet habits and would like to do a modified liquid diet prior to surgery to reach his goal weight.    5/2/22: Recently saw Dr. Carnes and is going to move forward with surgery. Required to lose 20 lbs prior to surgery. Currently taking Saxenda and trying to have smaller portions. Pt wants to utilize the modified liquid diet once surgery date is closer.     6/2/22: Pt has noticed that medication has been working. Has reduced portion sizes. Wants to try modified liquid diet.    10/7/22: Pt has not been doing well with weight loss goals recently. Has gotten \"off track\" and has not been focusing on healthy meals. Was not able to start modified liquid diet but plans to this month.    11/14/22: Pt has lost weight since last visit. Started on Saxenda-reports he is not feeling well/nauseous. Following a very low carb diet.       Recall Diet Questions Reviewed With Patient 10/21/2021   Describe what you typically consume for breakfast (typical or most recent): no breakfast   Describe what you typically consume for lunch (typical or most recent): fries burger ,pop   Describe what you typically consume for supper (typical or most recent): mozzrilla stick pop   Describe what you typically consume as snacks (typical or most recent): anything chips   How many ounces of water, or other low calorie drinks, do you drink daily (8 oz=1 glass)? 64 oz or more   How many ounces of caffeine (coffee, tea, pop) do you drink daily (8 oz=1 glass)? 32 oz   How many ounces of carbonated (pop, beer, sparkling water) drinks do you drinky daily (8 oz=1 glass)? 8 oz   How many ounces of juice, pop, sweet tea, sports drinks, protein drinks, other sweetened drinks, do you drink daily (8 oz=1 glass)? -   How many ounces of milk do you drink daily (8 oz=1 glass) 8 oz   Please indicate the type of milk: 1% "   How often do you drink alcohol? Monthly or less   If you do drink alcohol, how many drinks might you have in a day? (one drink = 5 oz. wine, 1 can/bottle of beer, 1 shot liquor) 1 or 2       Eating Habits 10/21/2021   Do you have any dietary restrictions? No   Do you currently binge eat (eat a large amount of food in a short time)? No   Are you an emotional eater? No   Do you get up to eat after falling asleep? No   What foods do you crave? anything chocolate,chips     Progress on Previous Goals  1) Modified Liquid Diet (2 liquid meals, 1 meal, 2 snacks)  [email or web address]     ADDITIONAL INFORMATION:  Physical activity- moves around a lot at work    Dining Out History Reviewed With Patient 10/21/2021   How often do you dine out? A couple of times a week.   Where do you dine out? (select all that apply) sit-down restaurants, fast food chains   What types of food do you order when you dine out? steak burger potatoe pop       Physical Activity Reviewed With Patient 2/7/2022   How often do you exercise? Never   What is the duration of your exercise (in minutes)? -   What types of exercise do you do? -   What keeps you from being more active?  Lack of Time       NUTRITION DIAGNOSIS:  Obesity r/t long history of positive energy balance aeb BMI >30 kg/m2.    INTERVENTION:  Intervention Provided/Education Provided on post-op diet guidelines, vitamins/minerals essential post-operatively, GI anatomy of bariatric surgeries, ways to help prepare for post-op diet guidelines pre-operatively, portion/calorie-control, mindful eating and sources of protein.  Patient demonstrates understanding. Provided pt with list of goals RD contact information.      Questions Reviewed With Patient 10/21/2021   How ready are you to make changes regarding your weight? Number 1 = Not ready at all to make changes up to 10 = very ready. 10   How confident are you that you can change? 1 = Not confident that you will be successful making  changes up to 10 = very confident. 10       Expected Engagement: good    GOALS:   1) Continue following low carb diet   2) Consume at least 60 grams of protein per day  3) Consume at least 48 oz of fluids per day      Your Stage 1 Diet: Clear Liquids  [email or web address]     Your Stage 2 Diet: Low-fat Full Liquids  [email or web address]     Your Stage 3 Diet: Pureed Foods  [email or web address]     Pureed Pleasures  [email or web address]    Your Stage 4 Diet: Soft Foods  [email or web address]    Your Stage 5 Diet: Regular Foods  [email or web address]    Supplements after Weight Loss Surgery  [email or web address]     Protein Sources for Weight Loss  [email or web address]     Carbohydrates  [email or web address]     Mindful Eating  [email or web address]     Summary of Volumetrics Eating Plan  [email or web address]     Diet Guidelines after Weight Loss Surgery  [email or web address]     Seated Exercises for Arms and Legs (can be done before or after surgery)  [email or web address]    Follow up: 1 month, prn    Time spent with patient: 9 minutes.  TORI BARROS RD, LD

## 2022-11-14 NOTE — PROGRESS NOTES
"Alcides Beauchamp is a 31 year old male who is being evaluated via a billable telephone visit.     The patient has been notified of following:     \"This telephone visit will be conducted via a call between you and your physician/provider. We have found that certain health care needs can be provided without the need for a physical exam.  This service lets us provide the care you need with a short phone conversation.  If a prescription is necessary we can send it directly to your pharmacy.  If lab work is needed we can place an order for that and you can then stop by our lab to have the test done at a later time.    Telephone visits are billed at different rates depending on your insurance coverage. During this emergency period, for some insurers they may be billed the same as an in-person visit.  Please reach out to your insurance provider with any questions.    If during the course of the call the physician/provider feels a telephone visit is not appropriate, you will not be charged for this service.\"    Patient has given verbal consent for Telephone visit?  Yes    How would you like to obtain your AVS? Mychart    Phone call duration: 9 minutes    During this virtual visit the patient is located in [place], patient verifies this as the location during the entirety of this visit.        Return Bariatric Nutrition Consultation Note    Reason For Visit: Nutrition Assessment    Alcides Beauchamp is a 31 year old presenting today for a return bariatric nutrition consult.   Pt is interested in laparoscopic sleeve gastrectomy or RNY with Dr. Sanchez expected surgery in [place].  Patient is accompanied by self.  Pt has met required nutrition visits prior to surgery.     Pt referred by ANITHA Johnson on February 15, 2022.  Patient with Co-morbidities of obesity including:  Type II DM no  Renal Failure no  Sleep apnea yes  Hypertension no   Dyslipidemia no  Joint pain no  Back pain no  GERD no     Support System Reviewed With Patient " "10/21/2021   Who do you have in your support network that can be available to help you for the first 2 weeks after surgery? wife and family   Who can you count on for support throughout your weight loss surgery journey? wife and family       ANTHROPOMETRICS:  Estimated body mass index is 48.22 kg/m  as calculated from the following:    Height as of 4/27/22: 1.702 m (5' 7\").    Weight as of 4/27/22: 139.7 kg (307 lb 14.4 oz).     Current weight (11/14/22): 307 lbs per pt     Required weight loss goal pre-op: -20 lbs from initial consult weight (goal weight 287 lbs or less before surgery)       10/21/2021   I have tried the following methods to lose weight Weight Loss Surgery       Weight Loss Questions Reviewed With Patient 10/21/2021   How long have you been overweight? Since puberty       SUPPLEMENT INFORMATION:  Vitamin D    Saxenda    NUTRITION HISTORY:  Hx of lap band (2009). Has gained ~100 lbs in the last year. Pt has been struggling with consuming large amounts of \"junk\" food. Wife is not cooking d/t being pregnant with twins. Struggles with large portion sizes.     Pt waiting for Dr. Carnes to determine if gastric sleeve or RNY is the best option.    3/9/22: Pt states he has noticed Saxenda working. He has less cravings and a lower appetite. Unsure of best time to be taking it-has been taking right before bed. Dr. Sanchez would like to do an endoscopy before deciding which surgery to pursue. Overall has improved diet- focused on more protein and vegetables and less processed foods.    4/1/22: Pt reports he has stopped taking Saxenda d/t sleepiness and body aches. Trying to decrease carbohydrates in his meals. Still only eating one meal per day in the evenings and snacking occasionally. States he does not think he will be able to change his diet habits and would like to do a modified liquid diet prior to surgery to reach his goal weight.    5/2/22: Recently saw Dr. Carnes and is going to move " "forward with surgery. Required to lose 20 lbs prior to surgery. Currently taking Saxenda and trying to have smaller portions. Pt wants to utilize the modified liquid diet once surgery date is closer.     6/2/22: Pt has noticed that medication has been working. Has reduced portion sizes. Wants to try modified liquid diet.    10/7/22: Pt has not been doing well with weight loss goals recently. Has gotten \"off track\" and has not been focusing on healthy meals. Was not able to start modified liquid diet but plans to this month.    11/14/22: Pt has lost weight since last visit. Started on Saxenda-reports he is not feeling well/nauseous. Following a very low carb diet.       Recall Diet Questions Reviewed With Patient 10/21/2021   Describe what you typically consume for breakfast (typical or most recent): no breakfast   Describe what you typically consume for lunch (typical or most recent): fries burger ,pop   Describe what you typically consume for supper (typical or most recent): mozzrilla stick pop   Describe what you typically consume as snacks (typical or most recent): anything chips   How many ounces of water, or other low calorie drinks, do you drink daily (8 oz=1 glass)? 64 oz or more   How many ounces of caffeine (coffee, tea, pop) do you drink daily (8 oz=1 glass)? 32 oz   How many ounces of carbonated (pop, beer, sparkling water) drinks do you drinky daily (8 oz=1 glass)? 8 oz   How many ounces of juice, pop, sweet tea, sports drinks, protein drinks, other sweetened drinks, do you drink daily (8 oz=1 glass)? -   How many ounces of milk do you drink daily (8 oz=1 glass) 8 oz   Please indicate the type of milk: 1%   How often do you drink alcohol? Monthly or less   If you do drink alcohol, how many drinks might you have in a day? (one drink = 5 oz. wine, 1 can/bottle of beer, 1 shot liquor) 1 or 2       Eating Habits 10/21/2021   Do you have any dietary restrictions? No   Do you currently binge eat (eat a large " amount of food in a short time)? No   Are you an emotional eater? No   Do you get up to eat after falling asleep? No   What foods do you crave? anything chocolate,chips     Progress on Previous Goals  1) Modified Liquid Diet (2 liquid meals, 1 meal, 2 snacks)  [email or web address]     ADDITIONAL INFORMATION:  Physical activity- moves around a lot at work    Dining Out History Reviewed With Patient 10/21/2021   How often do you dine out? A couple of times a week.   Where do you dine out? (select all that apply) sit-down restaurants, fast food chains   What types of food do you order when you dine out? steak burger potatoe pop       Physical Activity Reviewed With Patient 2/7/2022   How often do you exercise? Never   What is the duration of your exercise (in minutes)? -   What types of exercise do you do? -   What keeps you from being more active?  Lack of Time       NUTRITION DIAGNOSIS:  Obesity r/t long history of positive energy balance aeb BMI >30 kg/m2.    INTERVENTION:  Intervention Provided/Education Provided on post-op diet guidelines, vitamins/minerals essential post-operatively, GI anatomy of bariatric surgeries, ways to help prepare for post-op diet guidelines pre-operatively, portion/calorie-control, mindful eating and sources of protein.  Patient demonstrates understanding. Provided pt with list of goals RD contact information.      Questions Reviewed With Patient 10/21/2021   How ready are you to make changes regarding your weight? Number 1 = Not ready at all to make changes up to 10 = very ready. 10   How confident are you that you can change? 1 = Not confident that you will be successful making changes up to 10 = very confident. 10       Expected Engagement: good    GOALS:   1) Continue following low carb diet   2) Consume at least 60 grams of protein per day  3) Consume at least 48 oz of fluids per day      Your Stage 1 Diet: Clear Liquids  [email or web address]     Your Stage 2  Diet: Low-fat Full Liquids  [email or web address]     Your Stage 3 Diet: Pureed Foods  [email or web address]     Pureed Pleasures  [email or web address]    Your Stage 4 Diet: Soft Foods  [email or web address]    Your Stage 5 Diet: Regular Foods  [email or web address]    Supplements after Weight Loss Surgery  [email or web address]     Protein Sources for Weight Loss  [email or web address]     Carbohydrates  [email or web address]     Mindful Eating  [email or web address]     Summary of Volumetrics Eating Plan  [email or web address]     Diet Guidelines after Weight Loss Surgery  [email or web address]     Seated Exercises for Arms and Legs (can be done before or after surgery)  [email or web address]    Follow up: 1 month, prn    Time spent with patient: 9 minutes.  TORI BARROS RD, LD

## 2022-11-15 ENCOUNTER — TELEPHONE (OUTPATIENT)
Dept: ENDOCRINOLOGY | Facility: CLINIC | Age: 32
End: 2022-11-15

## 2022-11-15 NOTE — PATIENT INSTRUCTIONS
Hi Amr!    Follow-up with RD in 1 month    Thank you,    Zenia Cardona, RD, LD  If you would like to schedule or reschedule an appointment with the RD, please call [phone]    Nutrition Goals  1) Continue following low carb diet   2) Consume at least 60 grams of protein per day  3) Consume at least 48 oz of fluids per day    Interested in working with a health ? Health coaches work with you to improve your overall health and wellbeing. They look at the whole person, and may involve discussion of different areas of life, including, but not limited to the four pillars of health (sleep, exercise, nutrition, and stress management). Discuss with your care team if you would like to start working a health .    Health Coaching-3 Pack:    $99 for three health coaching visits    Visits may be done in person or via phone    Coaching is a partnership between the  and the client; Coaches do not prescribe or diagnose    Coaching helps inspire the client to reach his/her personal goals    COMPREHENSIVE WEIGHT MANAGEMENT PROGRAM  VIRTUAL SUPPORT GROUPS    For Support Group Information:      We offer support groups for patients who are working on weight loss and considering, preparing for or have had weight loss surgery.   There is no cost for this opportunity.  You are invited to attend the?Virtual Support Groups?provided by any of the following locations:    [facility] via [employer] Teams with Amy Benavidez RN  2.   [place] via [employer] Teams with Donald Obrien, PhD, LP  3.   [place] via [employer] with Cristin Baltazar RN  4.   [facility] via [employer] Teams with Cristin Belcher, [facility]    The following Support Group information can also be found on our website:  [email or web address]      [facility] Weight Loss Surgery Support Group    [facility] Weight Loss Surgery Support Group  The support group is a patient-lead  "forum that meets monthly to share experiences, encouragement and education. It is open to those who have had weight loss surgery, are scheduled for surgery, and those who are considering surgery.   WHEN: This group meets on the 3rd Wednesday of each month from 5:00PM - 6:00PM virtually using [employer] Teams.   FACILITATOR: Led by Amy Benavidez RD, LD, RN, the program's Clinical Coordinator.   TO REGISTER: Please contact the clinic via [employer] or call the nurse line directly at [phone] to inform our staff that you would like an invite sent to you and to let us know the email you would like the invite sent to. Prior to the meeting, a link with directions on how to join the meeting will be sent to you.    2022 Meetings  January 19: \"Let's Talk\" a time for the group to share.  February 16: \"Let's Talk\" a time for the group to share.  March 16: Guest Speakers: Psychologists, Lu Lees, PhD,LP and Marisol Quiles PsyD,  April 20: Guest Speaker: Health [place], [facility],CHES, [facility]  May 18: Guest Speaker: Dietitian, Miguel Chambers, HAYES, LP  Mone 15: \"Let's Talk\" a time for the group to share.  July 20: \"Let's Talk\" a time for the group to share.  August 17: TBA  September 21: TBA  October 19: Guest Speaker: Dr Dale Remy MD Pulmonologist and Sleep Medicine Physician, \"Getting a Good Night's Sleep\".  November 16: TBA  December 21: TBA    [facility] Support Groups    Connections: Bariatric Care Support Group?  This is open to all [facility] (and those external to this program) pre- and post- operative bariatric surgery patients as well as their support system.   WHEN: This group meets the 2nd Tuesday of each month from 6:30 PM - 8:00 PM virtually using Microsoft Teams.   FACILITATOR: Led by Donald Obrien, Ph.D who is a Licensed Psychologist with the [facility] Comprehensive Weight Management Program.   TO REGISTER: Please send an email to Donald Obrien, " "Ph.D., LP at?[email or web address]?if you would like an invitation to the group and to learn about using [employer] Teams.    2022 Meetings January 11: Tash Mendoza, PharmD, Pharmacy Resident at [facility], \"Medications and Bariatric Surgery\".  February 8: Open Forum  March 8  April 12  May 10  Mone 14    Connections: Post-Operative Bariatric Surgery Support Group  This is a support group for [facility] bariatric patients (and those external to [facility]) who have had bariatric surgery and are at least 3 months post-surgery.  WHEN: This support group meets the 4th Wednesday of the month from 11:00 AM - 12:00 PM virtually using Microsoft Teams.   FACILITATOR: Led by Certified Bariatric Nurse, Cristin Baltazar RN.   TO REGISTER: Please send an email to Cristin at [email or web address] if you would like an invitation to the group and to learn about using [employer] Teams.    2022 Meetings  January 26  February 23  March 23  April 27  May 25  Mone 22    [facility] Healthy Lifestyle Virtual Support Group    Healthy Lifestyle Virtual Support Group?  This is 60 minutes of small group guided discussion, support and resources. All are welcome who want a healthy lifestyle.  WHEN: This group meets monthly on a Friday from 12:30 PM - 1:30 PM virtually using Microsoft Teams.   FACILITATOR: Led by National Board Certified Health and , Cristin Belcher [facility].   TO REGISTER: Please send an email to Cristin at?[email or web address] to receive monthly invites to the group or if you have any questions about having a health .  Prior to the meeting, a link with directions on how to join the meeting will be sent to you.    2022 Meetings January 21: Antonella Domingo MS, RN, CIC, CBN, \"Healthy Habits\"  February 25: Open Forum  March 18: \"Setting Limits and Boundaries\"  April 29: Sanjuanita Royal RD, \"Meal Planning Made Easy\"  May 20: Open Forum June: To be " determined

## 2022-11-15 NOTE — TELEPHONE ENCOUNTER
Patient's wife Halina Love ([phone]) called to discuss what patient needs to do before scheduling surgery. She said Amr has quit smoking for a month now but has not done the nicotine labs yet. She wanted to know if he had to wait 2 months from when he quit or 2 months from when he did the lab. Two months from lab draw.

## 2022-11-25 ENCOUNTER — LAB (OUTPATIENT)
Dept: LAB | Facility: CLINIC | Age: 32
End: 2022-11-25
Payer: COMMERCIAL

## 2022-11-25 DIAGNOSIS — Z72.0 TOBACCO ABUSE: ICD-10-CM

## 2022-11-25 DIAGNOSIS — E66.01 MORBID OBESITY (H): ICD-10-CM

## 2022-11-25 LAB
ALBUMIN SERPL BCG-MCNC: 4.5 G/DL (ref 3.5–5.2)
ALP SERPL-CCNC: 90 U/L (ref 40–129)
ALT SERPL W P-5'-P-CCNC: 48 U/L (ref 10–50)
ANION GAP SERPL CALCULATED.3IONS-SCNC: 12 MMOL/L (ref 7–15)
AST SERPL W P-5'-P-CCNC: 24 U/L (ref 10–50)
BILIRUB SERPL-MCNC: 0.4 MG/DL
BUN SERPL-MCNC: 8.4 MG/DL (ref 6–20)
CALCIUM SERPL-MCNC: 9.9 MG/DL (ref 8.6–10)
CHLORIDE SERPL-SCNC: 105 MMOL/L (ref 98–107)
CHOLEST SERPL-MCNC: 196 MG/DL
CREAT SERPL-MCNC: 0.72 MG/DL (ref 0.67–1.17)
DEPRECATED HCO3 PLAS-SCNC: 23 MMOL/L (ref 22–29)
ERYTHROCYTE [DISTWIDTH] IN BLOOD BY AUTOMATED COUNT: 13.6 % (ref 10–15)
GFR SERPL CREATININE-BSD FRML MDRD: >90 ML/MIN/1.73M2
GLUCOSE SERPL-MCNC: 92 MG/DL (ref 70–99)
HBA1C MFR BLD: 5.5 %
HCT VFR BLD AUTO: 45.5 % (ref 40–53)
HDLC SERPL-MCNC: 44 MG/DL
HGB BLD-MCNC: 14.7 G/DL (ref 13.3–17.7)
LDLC SERPL CALC-MCNC: 123 MG/DL
MCH RBC QN AUTO: 25.6 PG (ref 26.5–33)
MCHC RBC AUTO-ENTMCNC: 32.3 G/DL (ref 31.5–36.5)
MCV RBC AUTO: 79 FL (ref 78–100)
NONHDLC SERPL-MCNC: 152 MG/DL
PLATELET # BLD AUTO: 328 10E3/UL (ref 150–450)
POTASSIUM SERPL-SCNC: 4.1 MMOL/L (ref 3.4–5.3)
PROT SERPL-MCNC: 7.5 G/DL (ref 6.4–8.3)
PTH-INTACT SERPL-MCNC: 26 PG/ML (ref 15–65)
RBC # BLD AUTO: 5.74 10E6/UL (ref 4.4–5.9)
SODIUM SERPL-SCNC: 140 MMOL/L (ref 136–145)
TRIGL SERPL-MCNC: 143 MG/DL
WBC # BLD AUTO: 9.8 10E3/UL (ref 4–11)

## 2022-11-25 PROCEDURE — 85027 COMPLETE CBC AUTOMATED: CPT | Performed by: PATHOLOGY

## 2022-11-25 PROCEDURE — 36415 COLL VENOUS BLD VENIPUNCTURE: CPT | Performed by: PATHOLOGY

## 2022-11-25 PROCEDURE — 83036 HEMOGLOBIN GLYCOSYLATED A1C: CPT | Performed by: PATHOLOGY

## 2022-11-25 PROCEDURE — 80323 ALKALOIDS NOS: CPT | Mod: 90 | Performed by: PATHOLOGY

## 2022-11-25 PROCEDURE — 82306 VITAMIN D 25 HYDROXY: CPT | Performed by: PATHOLOGY

## 2022-11-25 PROCEDURE — 83970 ASSAY OF PARATHORMONE: CPT | Performed by: PATHOLOGY

## 2022-11-25 PROCEDURE — 99000 SPECIMEN HANDLING OFFICE-LAB: CPT | Performed by: PATHOLOGY

## 2022-11-25 PROCEDURE — 80061 LIPID PANEL: CPT | Performed by: PATHOLOGY

## 2022-11-25 PROCEDURE — 80053 COMPREHEN METABOLIC PANEL: CPT | Performed by: PATHOLOGY

## 2022-11-28 LAB
ANABASINE UR-MCNC: <5 NG/ML
COTININE UR-MCNC: <15 NG/ML
NICOTINE UR-MCNC: <15 NG/ML
TRANS-3-OH-COTININE UR-MCNC: <50 NG/ML

## 2022-11-30 ENCOUNTER — CARE COORDINATION (OUTPATIENT)
Dept: ENDOCRINOLOGY | Facility: CLINIC | Age: 32
End: 2022-11-30

## 2022-11-30 LAB
DEPRECATED CALCIDIOL+CALCIFEROL SERPL-MC: 25 UG/L (ref 20–75)
VITAMIN D2 SERPL-MCNC: 8 UG/L
VITAMIN D3 SERPL-MCNC: 17 UG/L

## 2022-11-30 NOTE — PROGRESS NOTES
Tasklist updated and sent to patient via [employer].    Bariatric Task List  Fax:  Please fax all paperwork to: [phone] -     Status:  Is patient a candidate for bariatric surgery?:  patient is a candidate for bariatric surgery -     Cleared to schedule surgeon consult?:  cleared to schedule surgeon consult - 4/27/22 appt. Call [phone] to see agoin prior to surgery (within 6 months). s   Status:  surgery evaluation in process -     Surgeon: Daniel -     Tentative surgery month/year: To be determined. -        Insurance: Insurance:  [employer] [place] -      Contact insurance to discuss coverage: Completed -   [place] confirmed with insurance. To submit documentation. [facility]      Patient Info: Initial Weight:  307 - Per Dr Sanchez's note.[facility]   Date of Initial Weight/Height:  4/27/2022 -     Goal Weight (lbs):  287 - Per Dr Sanchez's note. s   Required Weight Loss:  20 lb -     Surgery Type:  other bariatric surgery - Possible sleeve gastrectomy or possible brunilda-en-Y gastric bypass (post gastric band removal in 2020), hiatal hernia repair      Dietician Visits: Structured weight loss required by insurance?:  structured weight loss required -   Need 6 visits within the year of surgery date for [employer].   Dietician Visit 1:  Completed - 2/16/22. s   Dietician Visit 2:  Completed - 3/9/22. s   Dietician Visit 3:  Completed - 4/1/22. [facility]   Dietician Visit 4:  Completed - 5/2/22 [facility]   Dietician Visit 5:  Completed - 6/2/22 appt. [facility]   Dietician Visit 6:  Completed - 10/7/22. [facility]   Dietician Visit additional:  Needed - Monthly until surgery for weight loss and postop diet teaching. s   Clearance from dietician to see surgeon?:    -     Dietician Notes:    -        Psychological Evaluation: Psych eval:  Completed - List and letter sent to pt 5/12/22 - [facility]; 8/16/22 Dr Armstrong, cleared when off nicotine and caffeine and at goal weight of 287 lbs. [facility]      Lab Work: Complete Blood Count:  Completed - 10/22/21. [facility]  "  Comprehensive Metabolic Panel:  Completed - 10/22/21. bks   Vitamin D:  Completed - 10/22/21 =12, recheck. bks   PTH:  Completed - 10/22/21 bks   Hgb A1c:  Completed - 10/22/21. bks    Lipids: Completed - 10/22/21  bks   Nicotine Testing:  Completed - Check level 1 month after quitting all nicotine products. Surgery can be 2 months after a negative level. 11/25/22 Negative. bks      Testing: EGD:  Completed - 4/13/22. bks      PCP: Establish care with PCP:  Completed -     PCP letter of support:  Completed - 10/5/22 Sanjay Farrell MD - in Media section. bks      Stopping Smoking/ Alcohol Use: Quit tobacco use (3 months smoke free)?:  Completed - Smoking cessation information sent to pt 5/12/22 - AS   Quit date:    - Check level one month off of all smoking/nicotine products. bks      Patient Education:  Information Session:  Needed -     Given \"Making your decision\" handout?:  Yes - 5/12/22 - AS   Given \"A Roadmap to you Weight Loss Surgery\" handout?: Yes - 5/12/22 - AS   Given \"Get Well Loop\" information?: Yes - 5/12/22 - AS   Given support group information?:    -     Attended support group?:  Needed -     Support plan in place?:  Completed - Yes. Wife. bks      Additional Surgery Requirements: Final nicotine screen:  Completed - 1 month after smoking cessation. - AS      Final Tasks:  Before surgery online class:  Needed -     Before surgery online class website link:  [email or web address]   After surgery online class:  Needed -     After surgery online class website link:  [email or web address]   Nurse visit per clinic:  Needed -     History and Physical per clinic:   -     Final labs per clinic: Needed -        Notes: Please register for the Sleeve Gastrectomy Get Well Loop when you get an email invitation after you get a surgery date.   The Get Well Loop will give you information via email or text messages that can help you be more successful before and after surgery " for up to one year after surgery.  It can also help answer any questions you may have.   Get Well Loop Handout - [email or web address]   -

## 2022-12-08 ENCOUNTER — VIRTUAL VISIT (OUTPATIENT)
Dept: ENDOCRINOLOGY | Facility: CLINIC | Age: 32
End: 2022-12-08
Payer: COMMERCIAL

## 2022-12-08 VITALS — BODY MASS INDEX: 48.18 KG/M2 | WEIGHT: 307 LBS | HEIGHT: 67 IN

## 2022-12-08 DIAGNOSIS — Z98.84 HISTORY OF REMOVAL OF LAPAROSCOPIC GASTRIC BANDING DEVICE: Primary | ICD-10-CM

## 2022-12-08 DIAGNOSIS — E66.813 CLASS 3 SEVERE OBESITY DUE TO EXCESS CALORIES WITH SERIOUS COMORBIDITY AND BODY MASS INDEX (BMI) OF 45.0 TO 49.9 IN ADULT (H): ICD-10-CM

## 2022-12-08 DIAGNOSIS — E66.01 CLASS 3 SEVERE OBESITY DUE TO EXCESS CALORIES WITH SERIOUS COMORBIDITY AND BODY MASS INDEX (BMI) OF 45.0 TO 49.9 IN ADULT (H): ICD-10-CM

## 2022-12-08 PROCEDURE — 99213 OFFICE O/P EST LOW 20 MIN: CPT | Mod: 95 | Performed by: PHYSICIAN ASSISTANT

## 2022-12-08 RX ORDER — LIRAGLUTIDE 6 MG/ML
3 INJECTION, SOLUTION SUBCUTANEOUS DAILY
Qty: 15 ML | Refills: 1 | Status: SHIPPED | OUTPATIENT
Start: 2022-12-08 | End: 2023-03-29

## 2022-12-08 RX ORDER — TOPIRAMATE 25 MG/1
TABLET, FILM COATED ORAL
Qty: 90 TABLET | Refills: 1 | Status: SHIPPED | OUTPATIENT
Start: 2022-12-08 | End: 2023-03-29

## 2022-12-08 RX ORDER — PHENTERMINE HYDROCHLORIDE 15 MG/1
15 CAPSULE ORAL EVERY MORNING
Qty: 30 CAPSULE | Refills: 1 | Status: SHIPPED | OUTPATIENT
Start: 2022-12-08 | End: 2023-03-29

## 2022-12-08 ASSESSMENT — PAIN SCALES - GENERAL: PAINLEVEL: NO PAIN (0)

## 2022-12-08 NOTE — NURSING NOTE
"(   Chief Complaint   Patient presents with     RECHECK     Pre-surg with meds    )    ( Weight: 139.3 kg (307 lb) (Patient reported) )  ( Height: 170.2 cm (5' 7\") )  ( BMI (Calculated): 48.08 )  (   )  (   )  (   )  (   )  (   )  (   )    (   )  (   )  (   )  (   )  (   )  (   )  (   )    (   Patient Active Problem List   Diagnosis     Class 3 severe obesity due to excess calories with serious comorbidity and body mass index (BMI) of 45.0 to 49.9 in adult (H)     History of removal of laparoscopic gastric banding device     Rash    )  (   Current Outpatient Medications   Medication Sig Dispense Refill     DULoxetine (CYMBALTA) 30 MG capsule Take 30 mg by mouth daily       hydrOXYzine (ATARAX) 25 MG tablet Take 25 mg by mouth every 4 hours as needed       ibuprofen (ADVIL/MOTRIN) 200 MG tablet Take 400-600 mg by mouth every 6 hours as needed       liraglutide - Weight Management (SAXENDA) 18 MG/3ML pen Inject 0.6 mg Subcutaneous daily       topiramate (TOPAMAX) 25 MG tablet 25mg at bedtime for week 1, 50mg at bedtime for 1 week, and 75mg at bedtime thereafter (Patient taking differently: Take 25 mg by mouth daily) 90 tablet 1    )  ( Diabetes Eval:    )    ( Pain Eval:  No Pain (0) )    ( Wound Eval:       )    (   History   Smoking Status     Former     Types: Cigarettes   Smokeless Tobacco     Never    )    ( Signed By:  Yaneli Ulrich, EMT; December 8, 2022; 9:06 AM )    "

## 2022-12-08 NOTE — PROGRESS NOTES
Alcides is a 32 year old who is being evaluated via a billable telephone visit.      What phone number would you like to be contacted at? [phone]  How would you like to obtain your AVS? Mita  Phone call duration: 10 minutes  During this phone visit the patient is located in [place], patient verifies this as the location during the entirety of this visit.       Return Medical Weight Management Note     Alcides Beauchamp  MRN:  [id number]  :  1990  REY:  2022    Dear Sanjay Farrell MD,    I had the pleasure of seeing your patient Alcides Beauchamp. He is a 32 year old male who I am continuing to see for treatment of obesity related to:       10/21/2021   I have the following health issues associated with obesity: Heart Disease, High Blood Pressure       Assessment & Plan   Problem List Items Addressed This Visit        Endocrine Diagnoses    Class 3 severe obesity due to excess calories with serious comorbidity and body mass index (BMI) of 45.0 to 49.9 in adult (H)    Relevant Medications    topiramate (TOPAMAX) 25 MG tablet    liraglutide - Weight Management (SAXENDA) 18 MG/3ML pen    phentermine (ADIPEX-P) 15 MG capsule       Other    History of removal of laparoscopic gastric banding device - Primary      Hx of lap band removal. Hoping to have surgery sleeve vs RYGB asap.  Meeting with Dr Sanchez next week  Working on preop weight loss after gaining weight during trip to [place]  Quit smoking 7 weeks ago and nicotine neg 22  Taking saxenda 2.4mg, try to increase to 3mg next week  Restart phentermine 15mg daily and topiramate ramp to 75mg daily to help with preop weight loss.  Has tolerated in the past.   See Dr Sanchez next week as planned to discuss plan for procedure, timing etc. We discussed today that he needs to still be at 287 weight loss goal despite weight gain in [place].  We also discussed that January surgery date while his mom is here with him is very unlikely.  Follow MTM 1 month  Follow  up Buffy 3 months      INTERVAL HISTORY:  MW follow up  Gained 13 lbs in [place] and now back to losing.  Today 307 lb  Quit tobacco 6 weeks ago  Goal weight 287 lbs  Mom is here from [place]    Anti-obesity medications:     Current:   Saxenda 2.4mg some nausea but improving with time.  Plans to go to 3mg next week      CURRENT WEIGHT:   307 lbs 0 oz    Initial Weight (lbs): 320 lbs  Last Visits Weight: 139.7 kg (307 lb 14.4 oz)  Cumulative weight loss (lbs): 13  Weight Loss Percentage: 4.06%    Changes and Difficulties 12/8/2022   I have made the following changes to my diet since my last visit: No changes   With regards to my diet, I am still struggling with: Nothing   I have made the following changes to my activity/exercise since my last visit: No changes   With regards to my activity/exercise, I am still struggling with: Nothing         MEDICATIONS:   Current Outpatient Medications   Medication Sig Dispense Refill     DULoxetine (CYMBALTA) 30 MG capsule Take 30 mg by mouth daily       hydrOXYzine (ATARAX) 25 MG tablet Take 25 mg by mouth every 4 hours as needed       ibuprofen (ADVIL/MOTRIN) 200 MG tablet Take 400-600 mg by mouth every 6 hours as needed       liraglutide - Weight Management (SAXENDA) 18 MG/3ML pen Inject 3 mg Subcutaneous daily 15 mL 1     phentermine (ADIPEX-P) 15 MG capsule Take 1 capsule (15 mg) by mouth every morning 30 capsule 1     topiramate (TOPAMAX) 25 MG tablet 25mg at bedtime for week 1, 50mg at bedtime for 1 week, and 75mg at bedtime thereafter 90 tablet 1       Weight Loss Medication History Reviewed With Patient 12/8/2022   Which weight loss medications are you currently taking on a regular basis?  Saxenda (liraglutide), Topamax (topiramate)   If you are not taking a weight loss medication that was prescribed to you, please indicate why: -   Are you having any side effects from the weight loss medication that we have prescribed you? Yes   If you are having side effects please  describe: Nausea       Lab on 11/25/2022   Component Date Value Ref Range Status     25 OH Vitamin D2 11/25/2022 8  ug/L Final     25 OH Vitamin D3 11/25/2022 17  ug/L Final     25 OH Vit D Total 11/25/2022 25  20 - 75 ug/L Final    Season, race, dietary intake, and treatment affect the concentration of 25-hydroxy-Vitamin D. Values may decrease during winter months and increase during summer months. Values 20-29 ug/L may indicate Vitamin D insufficiency and values <20 ug/L may indicate Vitamin D deficiency.     WBC Count 11/25/2022 9.8  4.0 - 11.0 10e3/uL Final     RBC Count 11/25/2022 5.74  4.40 - 5.90 10e6/uL Final     Hemoglobin 11/25/2022 14.7  13.3 - 17.7 g/dL Final     Hematocrit 11/25/2022 45.5  40.0 - 53.0 % Final     MCV 11/25/2022 79  78 - 100 fL Final     MCH 11/25/2022 25.6 (L)  26.5 - 33.0 pg Final     MCHC 11/25/2022 32.3  31.5 - 36.5 g/dL Final     RDW 11/25/2022 13.6  10.0 - 15.0 % Final     Platelet Count 11/25/2022 328  150 - 450 10e3/uL Final     Sodium 11/25/2022 140  136 - 145 mmol/L Final     Potassium 11/25/2022 4.1  3.4 - 5.3 mmol/L Final     Chloride 11/25/2022 105  98 - 107 mmol/L Final     Carbon Dioxide (CO2) 11/25/2022 23  22 - 29 mmol/L Final     Anion Gap 11/25/2022 12  7 - 15 mmol/L Final     Urea Nitrogen 11/25/2022 8.4  6.0 - 20.0 mg/dL Final     Creatinine 11/25/2022 0.72  0.67 - 1.17 mg/dL Final     Calcium 11/25/2022 9.9  8.6 - 10.0 mg/dL Final     Glucose 11/25/2022 92  70 - 99 mg/dL Final     Alkaline Phosphatase 11/25/2022 90  40 - 129 U/L Final     AST 11/25/2022 24  10 - 50 U/L Final     ALT 11/25/2022 48  10 - 50 U/L Final     Protein Total 11/25/2022 7.5  6.4 - 8.3 g/dL Final     Albumin 11/25/2022 4.5  3.5 - 5.2 g/dL Final     Bilirubin Total 11/25/2022 0.4  <=1.2 mg/dL Final     GFR Estimate 11/25/2022 >90  >60 mL/min/1.73m2 Final    Effective December 21, 2021 eGFRcr in adults is calculated using the 2021 CKD-EPI creatinine equation which includes age and gender (Mckay  et al., [facility], DOI: 10.1056/WOVOtn1423979)     Cholesterol 11/25/2022 196  <200 mg/dL Final     Triglycerides 11/25/2022 143  <150 mg/dL Final     Direct Measure HDL 11/25/2022 44  >=40 mg/dL Final     LDL Cholesterol Calculated 11/25/2022 123 (H)  <=100 mg/dL Final     Non HDL Cholesterol 11/25/2022 152 (H)  <130 mg/dL Final     Hemoglobin A1C 11/25/2022 5.5  <5.7 % Final    Normal <5.7%   Prediabetes 5.7-6.4%    Diabetes 6.5% or higher     Note: Adopted from ADA consensus guidelines.     Parathyroid Hormone Intact 11/25/2022 26  15 - 65 pg/mL Final     Cotinine Confirm 11/25/2022 <15  ng/mL Final     Nicotine Confirmation Urine 11/25/2022 <15  ng/mL Final    Comment: INTERPRETIVE INFORMATION: Nicotine and Metabolites,                             Urine, Quantitative    Methodology: Quantitative Liquid Chromatography-Tandem Mass   Spectrometry    Positive cutoff:  Nicotine  15 ng/mL  Cotinine  15 ng/mL  3-OH-Cotinine 50 ng/mL  Anabasine        5 ng/mL    For medical purposes only; not valid for forensic use.     This test is designed to evaluate recent use of   nicotine-containing products.  Passive and active exposure   cannot be discriminated definitively, although a cutoff of   100 ng/mL cotinine is frequently used for surgery   qualification purposes.  For smoking cessation programs or   compliance testing, the absence of expected drug(s) and/or   drug metabolite(s) may indicate non-compliance,   inappropriate timing of specimen collection relative to   drug administration, poor drug absorption,   diluted/adulterated urine, or limitations of testing. The   concentration value must be greater than or equal to the   cutoff to be reported as                            positive. Anabasine is included as   a biomarker of tobacco use, versus nicotine replacement.    Interpretive questions should be directed to the   laboratory.     This test was developed and its performance characteristics   determined by [facility]. It has not been cleared or   approved by the US Food and Drug Administration. This test   was performed in a CLIA certified laboratory and is   intended for clinical purposes.  Performed By: [facility]  [address] [place] [id number]  : Medardo Thorne MD, PhD     5-WL-Fdaraxch, Urn, Quant 11/25/2022 <50  ng/mL Final     Anabasine, Urn, Quant 11/25/2022 <5  ng/mL Final       PHYSICAL EXAM:  Objective    Ht 1.702 m (5' 7\")   Wt 139.3 kg (307 lb)   BMI 48.08 kg/m      Vitals - Patient Reported  Pain Score: No Pain (0)        Sincerely,    Buffy Forbes PA-C      15 minutes spent on the date of the encounter doing chart review, history and exam, documentation and further activities per the note  "

## 2022-12-08 NOTE — LETTER
2022       RE: Alcides Beauchamp  [address]  [place] [place] [address]     Dear Colleague,    Thank you for referring your patient, Alcides Beauchamp, to the [facility] [place] at . Please see a copy of my visit note below.    Alcides is a 32 year old who is being evaluated via a billable telephone visit.      What phone number would you like to be contacted at? [phone]  How would you like to obtain your AVS? MyChart  Phone call duration: 10 minutes  During this phone visit the patient is located in [place], patient verifies this as the location during the entirety of this visit.       Return Medical Weight Management Note     Alcides Beauchamp  MRN:  [id number]  :  1990  REY:  2022    Dear Sanjay Farrell MD,    I had the pleasure of seeing your patient Alcides Beauchamp. He is a 32 year old male who I am continuing to see for treatment of obesity related to:       10/21/2021   I have the following health issues associated with obesity: Heart Disease, High Blood Pressure       Assessment & Plan   Problem List Items Addressed This Visit        Endocrine Diagnoses    Class 3 severe obesity due to excess calories with serious comorbidity and body mass index (BMI) of 45.0 to 49.9 in adult (H)    Relevant Medications    topiramate (TOPAMAX) 25 MG tablet    liraglutide - Weight Management (SAXENDA) 18 MG/3ML pen    phentermine (ADIPEX-P) 15 MG capsule       Other    History of removal of laparoscopic gastric banding device - Primary      Hx of lap band removal. Hoping to have surgery sleeve vs RYGB asap.  Meeting with Dr Sanchez next week  Working on preop weight loss after gaining weight during trip to [place]  Quit smoking 7 weeks ago and nicotine neg 22  Taking saxenda 2.4mg, try to increase to 3mg next week  Restart phentermine 15mg daily and topiramate ramp to 75mg daily to help with preop weight loss.  Has tolerated in the  past.   See Dr Sanchez next week as planned to discuss plan for procedure, timing etc. We discussed today that he needs to still be at 287 weight loss goal despite weight gain in [place].  We also discussed that January surgery date while his mom is here with him is very unlikely.  Follow MTM 1 month  Follow up Buffy 3 months      INTERVAL HISTORY:  MWM follow up  Gained 13 lbs in [place] and now back to losing.  Today 307 lb  Quit tobacco 6 weeks ago  Goal weight 287 lbs  Mom is here from [place]    Anti-obesity medications:     Current:   Saxenda 2.4mg some nausea but improving with time.  Plans to go to 3mg next week      CURRENT WEIGHT:   307 lbs 0 oz    Initial Weight (lbs): 320 lbs  Last Visits Weight: 139.7 kg (307 lb 14.4 oz)  Cumulative weight loss (lbs): 13  Weight Loss Percentage: 4.06%    Changes and Difficulties 12/8/2022   I have made the following changes to my diet since my last visit: No changes   With regards to my diet, I am still struggling with: Nothing   I have made the following changes to my activity/exercise since my last visit: No changes   With regards to my activity/exercise, I am still struggling with: Nothing         MEDICATIONS:   Current Outpatient Medications   Medication Sig Dispense Refill     DULoxetine (CYMBALTA) 30 MG capsule Take 30 mg by mouth daily       hydrOXYzine (ATARAX) 25 MG tablet Take 25 mg by mouth every 4 hours as needed       ibuprofen (ADVIL/MOTRIN) 200 MG tablet Take 400-600 mg by mouth every 6 hours as needed       liraglutide - Weight Management (SAXENDA) 18 MG/3ML pen Inject 3 mg Subcutaneous daily 15 mL 1     phentermine (ADIPEX-P) 15 MG capsule Take 1 capsule (15 mg) by mouth every morning 30 capsule 1     topiramate (TOPAMAX) 25 MG tablet 25mg at bedtime for week 1, 50mg at bedtime for 1 week, and 75mg at bedtime thereafter 90 tablet 1       Weight Loss Medication History Reviewed With Patient 12/8/2022   Which weight loss medications are you currently  taking on a regular basis?  Saxenda (liraglutide), Topamax (topiramate)   If you are not taking a weight loss medication that was prescribed to you, please indicate why: -   Are you having any side effects from the weight loss medication that we have prescribed you? Yes   If you are having side effects please describe: Nausea       Lab on 11/25/2022   Component Date Value Ref Range Status     25 OH Vitamin D2 11/25/2022 8  ug/L Final     25 OH Vitamin D3 11/25/2022 17  ug/L Final     25 OH Vit D Total 11/25/2022 25  20 - 75 ug/L Final    Season, race, dietary intake, and treatment affect the concentration of 25-hydroxy-Vitamin D. Values may decrease during winter months and increase during summer months. Values 20-29 ug/L may indicate Vitamin D insufficiency and values <20 ug/L may indicate Vitamin D deficiency.     WBC Count 11/25/2022 9.8  4.0 - 11.0 10e3/uL Final     RBC Count 11/25/2022 5.74  4.40 - 5.90 10e6/uL Final     Hemoglobin 11/25/2022 14.7  13.3 - 17.7 g/dL Final     Hematocrit 11/25/2022 45.5  40.0 - 53.0 % Final     MCV 11/25/2022 79  78 - 100 fL Final     MCH 11/25/2022 25.6 (L)  26.5 - 33.0 pg Final     MCHC 11/25/2022 32.3  31.5 - 36.5 g/dL Final     RDW 11/25/2022 13.6  10.0 - 15.0 % Final     Platelet Count 11/25/2022 328  150 - 450 10e3/uL Final     Sodium 11/25/2022 140  136 - 145 mmol/L Final     Potassium 11/25/2022 4.1  3.4 - 5.3 mmol/L Final     Chloride 11/25/2022 105  98 - 107 mmol/L Final     Carbon Dioxide (CO2) 11/25/2022 23  22 - 29 mmol/L Final     Anion Gap 11/25/2022 12  7 - 15 mmol/L Final     Urea Nitrogen 11/25/2022 8.4  6.0 - 20.0 mg/dL Final     Creatinine 11/25/2022 0.72  0.67 - 1.17 mg/dL Final     Calcium 11/25/2022 9.9  8.6 - 10.0 mg/dL Final     Glucose 11/25/2022 92  70 - 99 mg/dL Final     Alkaline Phosphatase 11/25/2022 90  40 - 129 U/L Final     AST 11/25/2022 24  10 - 50 U/L Final     ALT 11/25/2022 48  10 - 50 U/L Final     Protein Total 11/25/2022 7.5  6.4 - 8.3  g/dL Final     Albumin 11/25/2022 4.5  3.5 - 5.2 g/dL Final     Bilirubin Total 11/25/2022 0.4  <=1.2 mg/dL Final     GFR Estimate 11/25/2022 >90  >60 mL/min/1.73m2 Final    Effective December 21, 2021 eGFRcr in adults is calculated using the 2021 CKD-EPI creatinine equation which includes age and gender (Mckay et al., [place], DOI: 10.1056/CQYUux0316799)     Cholesterol 11/25/2022 196  <200 mg/dL Final     Triglycerides 11/25/2022 143  <150 mg/dL Final     Direct Measure HDL 11/25/2022 44  >=40 mg/dL Final     LDL Cholesterol Calculated 11/25/2022 123 (H)  <=100 mg/dL Final     Non HDL Cholesterol 11/25/2022 152 (H)  <130 mg/dL Final     Hemoglobin A1C 11/25/2022 5.5  <5.7 % Final    Normal <5.7%   Prediabetes 5.7-6.4%    Diabetes 6.5% or higher     Note: Adopted from ADA consensus guidelines.     Parathyroid Hormone Intact 11/25/2022 26  15 - 65 pg/mL Final     Cotinine Confirm 11/25/2022 <15  ng/mL Final     Nicotine Confirmation Urine 11/25/2022 <15  ng/mL Final    Comment: INTERPRETIVE INFORMATION: Nicotine and Metabolites,                             Urine, Quantitative    Methodology: Quantitative Liquid Chromatography-Tandem Mass   Spectrometry    Positive cutoff:  Nicotine  15 ng/mL  Cotinine  15 ng/mL  3-OH-Cotinine 50 ng/mL  Anabasine        5 ng/mL    For medical purposes only; not valid for forensic use.     This test is designed to evaluate recent use of   nicotine-containing products.  Passive and active exposure   cannot be discriminated definitively, although a cutoff of   100 ng/mL cotinine is frequently used for surgery   qualification purposes.  For smoking cessation programs or   compliance testing, the absence of expected drug(s) and/or   drug metabolite(s) may indicate non-compliance,   inappropriate timing of specimen collection relative to   drug administration, poor drug absorption,   diluted/adulterated urine, or limitations of testing. The   concentration value must be greater than or equal  "to the   cutoff to be reported as                            positive. Anabasine is included as   a biomarker of tobacco use, versus nicotine replacement.    Interpretive questions should be directed to the   laboratory.     This test was developed and its performance characteristics   determined by [facility]. It has not been cleared or   approved by the US Food and Drug Administration. This test   was performed in a CLIA certified laboratory and is   intended for clinical purposes.  Performed By: [facility]  [address] [place] [id number]  : Medardo Thorne MD, PhD     2-ZN-Fitorjps, Urn, Quant 11/25/2022 <50  ng/mL Final     Anabasine, Urn, Quant 11/25/2022 <5  ng/mL Final       PHYSICAL EXAM:  Objective     Ht 1.702 m (5' 7\")   Wt 139.3 kg (307 lb)   BMI 48.08 kg/m      Vitals - Patient Reported  Pain Score: No Pain (0)        Sincerely,    Buffy Forbes PA-C      15 minutes spent on the date of the encounter doing chart review, history and exam, documentation and further activities per the note      "

## 2022-12-14 ENCOUNTER — TELEPHONE (OUTPATIENT)
Dept: SURGERY | Facility: CLINIC | Age: 32
End: 2022-12-14

## 2022-12-14 ENCOUNTER — OFFICE VISIT (OUTPATIENT)
Dept: ENDOCRINOLOGY | Facility: CLINIC | Age: 32
End: 2022-12-14
Payer: COMMERCIAL

## 2022-12-14 VITALS
DIASTOLIC BLOOD PRESSURE: 82 MMHG | WEIGHT: 306.6 LBS | SYSTOLIC BLOOD PRESSURE: 129 MMHG | BODY MASS INDEX: 48.12 KG/M2 | OXYGEN SATURATION: 98 % | HEART RATE: 96 BPM | HEIGHT: 67 IN

## 2022-12-14 DIAGNOSIS — E66.01 CLASS 3 SEVERE OBESITY DUE TO EXCESS CALORIES WITH SERIOUS COMORBIDITY AND BODY MASS INDEX (BMI) OF 45.0 TO 49.9 IN ADULT (H): Primary | ICD-10-CM

## 2022-12-14 DIAGNOSIS — E66.813 CLASS 3 SEVERE OBESITY DUE TO EXCESS CALORIES WITH SERIOUS COMORBIDITY AND BODY MASS INDEX (BMI) OF 45.0 TO 49.9 IN ADULT (H): Primary | ICD-10-CM

## 2022-12-14 PROCEDURE — 99213 OFFICE O/P EST LOW 20 MIN: CPT | Performed by: SURGERY

## 2022-12-14 ASSESSMENT — PAIN SCALES - GENERAL: PAINLEVEL: NO PAIN (0)

## 2022-12-14 NOTE — LETTER
12/14/2022       RE: Alcides Beauchamp  [address]  [facility] [address]     Dear Colleague,    Thank you for referring your patient, Alcides Beauchamp, to the [facility] [place] at [facility]. Please see a copy of my visit note below.    32-year-old gentleman who has history of a laparoscopic adjustable band placed.  This band has been removed.  The patient is taking Saxenda in preparation for vertical sleeve gastrectomy/Homero-en-Y gastric bypass.  The preference is for the former.  However if we find anatomically this is challenging we would perform a Homero-en-Y gastric bypass.  The patient understands procedures risk potential complications and alternatives.  Moreover he understands the importance of the weight loss goal.  He is on the new dose of Saxenda and hopefully we can get him down to approximately a weight of 290 pounds at which point it would be okay for him to schedule.  We would like to target the weight at the time of surgery for 287 pounds or potentially less.  He understands plan and would like to proceed.      Sincerely,    Kameron Sanchez MD

## 2022-12-14 NOTE — PROGRESS NOTES
32-year-old gentleman who has history of a laparoscopic adjustable band placed.  This band has been removed.  The patient is taking Saxenda in preparation for vertical sleeve gastrectomy/Homero-en-Y gastric bypass.  The preference is for the former.  However if we find anatomically this is challenging we would perform a Hoemro-en-Y gastric bypass.  The patient understands procedures risk potential complications and alternatives.  Moreover he understands the importance of the weight loss goal.  He is on the new dose of Saxenda and hopefully we can get him down to approximately a weight of 290 pounds at which point it would be okay for him to schedule.  We would like to target the weight at the time of surgery for 287 pounds or potentially less.  He understands plan and would like to proceed.

## 2022-12-14 NOTE — TELEPHONE ENCOUNTER
Central Prior Authorization Team   Phone: [phone]      PA Initiation    Medication: Saxenda-PA initiated  Insurance Company: [facility] - Phone [phone] Fax [phone]  Pharmacy Filling the Rx: [id number] - [place], [place] - [address] AT [facility] [address]  Filling Pharmacy Phone: [phone]  Filling Pharmacy Fax:    Start Date: 12/14/2022

## 2022-12-14 NOTE — TELEPHONE ENCOUNTER
Weight Loss Meds:  Confirmed via test claim, pa needed on [facility] [place] insurance.  BIN:[id number] PCN:[facility]: [id number] ID:[id number]  Please initiate PA.

## 2022-12-14 NOTE — NURSING NOTE
"(   Chief Complaint   Patient presents with     New Patient     Meet and greet    )    ( Weight: 139.1 kg (306 lb 9.6 oz) )  ( Height: 170.2 cm (5' 7\") )  ( BMI (Calculated): 48.02 )  (   )  (   )  (   )  (   )  (   )  (   )    ( BP: 129/82 )  (   )  (   )  (   )  ( Pulse: 96 )  (   )  ( SpO2: 98 % )    (   Patient Active Problem List   Diagnosis     Class 3 severe obesity due to excess calories with serious comorbidity and body mass index (BMI) of 45.0 to 49.9 in adult (H)     History of removal of laparoscopic gastric banding device     Rash    )  (   Current Outpatient Medications   Medication Sig Dispense Refill     DULoxetine (CYMBALTA) 30 MG capsule Take 30 mg by mouth daily       hydrOXYzine (ATARAX) 25 MG tablet Take 25 mg by mouth every 4 hours as needed       ibuprofen (ADVIL/MOTRIN) 200 MG tablet Take 400-600 mg by mouth every 6 hours as needed       liraglutide - Weight Management (SAXENDA) 18 MG/3ML pen Inject 3 mg Subcutaneous daily 15 mL 1     phentermine (ADIPEX-P) 15 MG capsule Take 1 capsule (15 mg) by mouth every morning (Patient not taking: Reported on 12/14/2022) 30 capsule 1     topiramate (TOPAMAX) 25 MG tablet 25mg at bedtime for week 1, 50mg at bedtime for 1 week, and 75mg at bedtime thereafter (Patient not taking: Reported on 12/14/2022) 90 tablet 1    )  ( Diabetes Eval:    )    ( Pain Eval:  No Pain (0) )    ( Wound Eval:       )    (   History   Smoking Status     Former     Types: Cigarettes   Smokeless Tobacco     Never    )    ( Signed By:  Devin Read, EMT; December 14, 2022; 8:34 AM )    "

## 2022-12-19 NOTE — TELEPHONE ENCOUNTER
PRIOR AUTHORIZATION DENIED    Medication: Saxenda-PA denied    Denial Date: 12/17/2022    Denial Rational:

## 2022-12-20 ENCOUNTER — VIRTUAL VISIT (OUTPATIENT)
Dept: ENDOCRINOLOGY | Facility: CLINIC | Age: 32
End: 2022-12-20
Payer: COMMERCIAL

## 2022-12-20 DIAGNOSIS — E66.813 CLASS 3 SEVERE OBESITY DUE TO EXCESS CALORIES WITH SERIOUS COMORBIDITY AND BODY MASS INDEX (BMI) OF 45.0 TO 49.9 IN ADULT (H): ICD-10-CM

## 2022-12-20 DIAGNOSIS — Z98.84 HISTORY OF REMOVAL OF LAPAROSCOPIC GASTRIC BANDING DEVICE: ICD-10-CM

## 2022-12-20 DIAGNOSIS — Z71.3 NUTRITIONAL COUNSELING: Primary | ICD-10-CM

## 2022-12-20 DIAGNOSIS — E66.01 CLASS 3 SEVERE OBESITY DUE TO EXCESS CALORIES WITH SERIOUS COMORBIDITY AND BODY MASS INDEX (BMI) OF 45.0 TO 49.9 IN ADULT (H): ICD-10-CM

## 2022-12-20 DIAGNOSIS — E66.9 OBESITY: ICD-10-CM

## 2022-12-20 PROCEDURE — 97803 MED NUTRITION INDIV SUBSEQ: CPT | Mod: 95 | Performed by: DIETITIAN, REGISTERED

## 2022-12-20 NOTE — PROGRESS NOTES
"Alcides Beauchamp is a 31 year old male who is being evaluated via a billable telephone visit.     The patient has been notified of following:     \"This telephone visit will be conducted via a call between you and your physician/provider. We have found that certain health care needs can be provided without the need for a physical exam.  This service lets us provide the care you need with a short phone conversation.  If a prescription is necessary we can send it directly to your pharmacy.  If lab work is needed we can place an order for that and you can then stop by our lab to have the test done at a later time.    Telephone visits are billed at different rates depending on your insurance coverage. During this emergency period, for some insurers they may be billed the same as an in-person visit.  Please reach out to your insurance provider with any questions.    If during the course of the call the physician/provider feels a telephone visit is not appropriate, you will not be charged for this service.\"    Patient has given verbal consent for Telephone visit?  Yes    How would you like to obtain your AVS? Mychart    Phone call duration: 9 minutes    During this virtual visit the patient is located in [place], patient verifies this as the location during the entirety of this visit.        Return Bariatric Nutrition Consultation Note    Reason For Visit: Nutrition Assessment    Alcides Beauchamp is a 31 year old presenting today for a return bariatric nutrition consult.   Pt is interested in laparoscopic sleeve gastrectomy or RNY with Dr. Sanchez expected surgery in [place].  Patient is accompanied by self.  Pt has met required nutrition visits prior to surgery.     Pt referred by ANITHA Johnson on February 15, 2022.  Patient with Co-morbidities of obesity including:  Type II DM no  Renal Failure no  Sleep apnea yes  Hypertension no   Dyslipidemia no  Joint pain no  Back pain no  GERD no     Support System Reviewed With Patient " "10/21/2021   Who do you have in your support network that can be available to help you for the first 2 weeks after surgery? wife and family   Who can you count on for support throughout your weight loss surgery journey? wife and family       ANTHROPOMETRICS:  Estimated body mass index is 48.02 kg/m  as calculated from the following:    Height as of 12/14/22: 1.702 m (5' 7\").    Weight as of 12/14/22: 139.1 kg (306 lb 9.6 oz).     Current weight (12/20/22): 307 lbs per pt     Required weight loss goal pre-op: -20 lbs from initial consult weight (goal weight 287 lbs or less before surgery)       10/21/2021   I have tried the following methods to lose weight Weight Loss Surgery       Weight Loss Questions Reviewed With Patient 10/21/2021   How long have you been overweight? Since puberty       SUPPLEMENT INFORMATION:  Vitamin D    Saxenda    NUTRITION HISTORY:  Hx of lap band (2009). Has gained ~100 lbs in the last year. Pt has been struggling with consuming large amounts of \"junk\" food. Wife is not cooking d/t being pregnant with twins. Struggles with large portion sizes.     Pt waiting for Dr. Carnes to determine if gastric sleeve or RNY is the best option.    3/9/22: Pt states he has noticed Saxenda working. He has less cravings and a lower appetite. Unsure of best time to be taking it-has been taking right before bed. Dr. Sanchez would like to do an endoscopy before deciding which surgery to pursue. Overall has improved diet- focused on more protein and vegetables and less processed foods.    4/1/22: Pt reports he has stopped taking Saxenda d/t sleepiness and body aches. Trying to decrease carbohydrates in his meals. Still only eating one meal per day in the evenings and snacking occasionally. States he does not think he will be able to change his diet habits and would like to do a modified liquid diet prior to surgery to reach his goal weight.    5/2/22: Recently saw Dr. Carnes and is going to move " "forward with surgery. Required to lose 20 lbs prior to surgery. Currently taking Saxenda and trying to have smaller portions. Pt wants to utilize the modified liquid diet once surgery date is closer.     6/2/22: Pt has noticed that medication has been working. Has reduced portion sizes. Wants to try modified liquid diet.    10/7/22: Pt has not been doing well with weight loss goals recently. Has gotten \"off track\" and has not been focusing on healthy meals. Was not able to start modified liquid diet but plans to this month.    11/14/22: Pt has lost weight since last visit. Started on Saxenda-reports he is not feeling well/nauseous. Following a very low carb diet.     12/20/22: Pt has not lost weight since last visit- has been struggling with the holidays. Wanting to start modified liquid diet next week.     Recall Diet Questions Reviewed With Patient 10/21/2021   Describe what you typically consume for breakfast (typical or most recent): no breakfast   Describe what you typically consume for lunch (typical or most recent): fries burger ,pop   Describe what you typically consume for supper (typical or most recent): mozzrilla stick pop   Describe what you typically consume as snacks (typical or most recent): anything chips   How many ounces of water, or other low calorie drinks, do you drink daily (8 oz=1 glass)? 64 oz or more   How many ounces of caffeine (coffee, tea, pop) do you drink daily (8 oz=1 glass)? 32 oz   How many ounces of carbonated (pop, beer, sparkling water) drinks do you drinky daily (8 oz=1 glass)? 8 oz   How many ounces of juice, pop, sweet tea, sports drinks, protein drinks, other sweetened drinks, do you drink daily (8 oz=1 glass)? -   How many ounces of milk do you drink daily (8 oz=1 glass) 8 oz   Please indicate the type of milk: 1%   How often do you drink alcohol? Monthly or less   If you do drink alcohol, how many drinks might you have in a day? (one drink = 5 oz. wine, 1 can/bottle of " beer, 1 shot liquor) 1 or 2       Eating Habits 10/21/2021   Do you have any dietary restrictions? No   Do you currently binge eat (eat a large amount of food in a short time)? No   Are you an emotional eater? No   Do you get up to eat after falling asleep? No   What foods do you crave? anything chocolate,chips     Progress on Previous Goals- not met  1) Continue following low carb diet   2) Consume at least 60 grams of protein per day  3) Consume at least 48 oz of fluids per day    ADDITIONAL INFORMATION:  Physical activity- moves around a lot at work    Dining Out History Reviewed With Patient 10/21/2021   How often do you dine out? A couple of times a week.   Where do you dine out? (select all that apply) sit-down restaurants, fast food chains   What types of food do you order when you dine out? steak burger potatoe pop       Physical Activity Reviewed With Patient 2/7/2022   How often do you exercise? Never   What is the duration of your exercise (in minutes)? -   What types of exercise do you do? -   What keeps you from being more active?  Lack of Time       NUTRITION DIAGNOSIS:  Obesity r/t long history of positive energy balance aeb BMI >30 kg/m2.    INTERVENTION:  Intervention Provided/Education Provided on post-op diet guidelines, vitamins/minerals essential post-operatively, GI anatomy of bariatric surgeries, ways to help prepare for post-op diet guidelines pre-operatively, portion/calorie-control, mindful eating and sources of protein.  Patient demonstrates understanding. Provided pt with list of goals RD contact information.      Questions Reviewed With Patient 10/21/2021   How ready are you to make changes regarding your weight? Number 1 = Not ready at all to make changes up to 10 = very ready. 10   How confident are you that you can change? 1 = Not confident that you will be successful making changes up to 10 = very confident. 10       Expected Engagement: good    GOALS:   1) Modified Liquid Diet (2  liquid meals, 1 meal, 2 snacks)  [email or web address]     Meal Replacement Shake Options:   *Protein Shake Criteria: no more than 210 Calories, at least 20 grams of protein, and less than 10 grams of sugar   [employer] smoothie (160 Calories, 20 g protein)   Premier Protein (160 Calories, 30 g protein)  Slim Fast Advanced Nutrition (180 Calories, 20 g protein)  Muscle Milk, lactose-free, 17 oz bottle (210 Calories, 30 g protein)  Integrated Supplements, no artificial sugars (110 Calories, 20 g protein)  Genepro, unflavored protein powder (60 Calories, 30 g protein)  Boost/Ensure Max (160 calories, 30 gm protein)   [employer] Core Power (170 calories, 26 gm protein)  Aldi's Elevation Protein Powder (180 calories, 30 gm protein)     Meal Replacement Bar Options:  [employer] Protein Shake (160 Calories, 15 g protein)  Quest Protein Bars (190 Calories, 20 g protein)  Built Bar (170 Calories, 15-20 g protein)  One Protein Bar (210 calories, 20 g protein)  [place] Signature Protein Bar ([employer]) (190 Calories, 21 g protein)  Pure Protein Bars (180 Calories, 21 g protein)    Low Calorie Frozen Meal:  Healthy Choice Power Bowls  Lean Cuisine  Smart Ones  Larry Fraire    Follow up: 1 month, prn    Time spent with patient: 9 minutes.  TORI BARROS RD, LD

## 2022-12-20 NOTE — LETTER
"12/20/2022       RE: Alcides Beauchamp  [address]  [place] [address]     Dear Colleague,    Thank you for referring your patient, Alcides Beauchamp, to the [facility] [place] [facility]. Please see a copy of my visit note below.    Alcides Beauchamp is a 31 year old male who is being evaluated via a billable telephone visit.     The patient has been notified of following:     \"This telephone visit will be conducted via a call between you and your physician/provider. We have found that certain health care needs can be provided without the need for a physical exam.  This service lets us provide the care you need with a short phone conversation.  If a prescription is necessary we can send it directly to your pharmacy.  If lab work is needed we can place an order for that and you can then stop by our lab to have the test done at a later time.    Telephone visits are billed at different rates depending on your insurance coverage. During this emergency period, for some insurers they may be billed the same as an in-person visit.  Please reach out to your insurance provider with any questions.    If during the course of the call the physician/provider feels a telephone visit is not appropriate, you will not be charged for this service.\"    Patient has given verbal consent for Telephone visit?  Yes    How would you like to obtain your AVS? Linda    Phone call duration: 9 minutes    During this virtual visit the patient is located in [place], patient verifies this as the location during the entirety of this visit.        Return Bariatric Nutrition Consultation Note    Reason For Visit: Nutrition Assessment    Alcides Beauchamp is a 31 year old presenting today for a return bariatric nutrition consult.   Pt is interested in laparoscopic sleeve gastrectomy or RNY with Dr. Sanchez expected surgery in [place].  Patient is accompanied by self.  Pt has met required " "nutrition visits prior to surgery.     Pt referred by ANITHA Johnson on February 15, 2022.  Patient with Co-morbidities of obesity including:  Type II DM no  Renal Failure no  Sleep apnea yes  Hypertension no   Dyslipidemia no  Joint pain no  Back pain no  GERD no     Support System Reviewed With Patient 10/21/2021   Who do you have in your support network that can be available to help you for the first 2 weeks after surgery? wife and family   Who can you count on for support throughout your weight loss surgery journey? wife and family       ANTHROPOMETRICS:  Estimated body mass index is 48.02 kg/m  as calculated from the following:    Height as of 12/14/22: 1.702 m (5' 7\").    Weight as of 12/14/22: 139.1 kg (306 lb 9.6 oz).     Current weight (12/20/22): 307 lbs per pt     Required weight loss goal pre-op: -20 lbs from initial consult weight (goal weight 287 lbs or less before surgery)       10/21/2021   I have tried the following methods to lose weight Weight Loss Surgery       Weight Loss Questions Reviewed With Patient 10/21/2021   How long have you been overweight? Since puberty       SUPPLEMENT INFORMATION:  Vitamin D    Saxenda    NUTRITION HISTORY:  Hx of lap band (2009). Has gained ~100 lbs in the last year. Pt has been struggling with consuming large amounts of \"junk\" food. Wife is not cooking d/t being pregnant with twins. Struggles with large portion sizes.     Pt waiting for Dr. Carnes to determine if gastric sleeve or RNY is the best option.    3/9/22: Pt states he has noticed Saxenda working. He has less cravings and a lower appetite. Unsure of best time to be taking it-has been taking right before bed. Dr. Sanchez would like to do an endoscopy before deciding which surgery to pursue. Overall has improved diet- focused on more protein and vegetables and less processed foods.    4/1/22: Pt reports he has stopped taking Saxenda d/t sleepiness and body aches. Trying to decrease carbohydrates " "in his meals. Still only eating one meal per day in the evenings and snacking occasionally. States he does not think he will be able to change his diet habits and would like to do a modified liquid diet prior to surgery to reach his goal weight.    5/2/22: Recently saw Dr. Carnes and is going to move forward with surgery. Required to lose 20 lbs prior to surgery. Currently taking Saxenda and trying to have smaller portions. Pt wants to utilize the modified liquid diet once surgery date is closer.     6/2/22: Pt has noticed that medication has been working. Has reduced portion sizes. Wants to try modified liquid diet.    10/7/22: Pt has not been doing well with weight loss goals recently. Has gotten \"off track\" and has not been focusing on healthy meals. Was not able to start modified liquid diet but plans to this month.    11/14/22: Pt has lost weight since last visit. Started on Saxenda-reports he is not feeling well/nauseous. Following a very low carb diet.     12/20/22: Pt has not lost weight since last visit- has been struggling with the holidays. Wanting to start modified liquid diet next week.     Recall Diet Questions Reviewed With Patient 10/21/2021   Describe what you typically consume for breakfast (typical or most recent): no breakfast   Describe what you typically consume for lunch (typical or most recent): fries burger ,pop   Describe what you typically consume for supper (typical or most recent): mozzrilla stick pop   Describe what you typically consume as snacks (typical or most recent): anything chips   How many ounces of water, or other low calorie drinks, do you drink daily (8 oz=1 glass)? 64 oz or more   How many ounces of caffeine (coffee, tea, pop) do you drink daily (8 oz=1 glass)? 32 oz   How many ounces of carbonated (pop, beer, sparkling water) drinks do you drinky daily (8 oz=1 glass)? 8 oz   How many ounces of juice, pop, sweet tea, sports drinks, protein drinks, other sweetened " drinks, do you drink daily (8 oz=1 glass)? -   How many ounces of milk do you drink daily (8 oz=1 glass) 8 oz   Please indicate the type of milk: 1%   How often do you drink alcohol? Monthly or less   If you do drink alcohol, how many drinks might you have in a day? (one drink = 5 oz. wine, 1 can/bottle of beer, 1 shot liquor) 1 or 2       Eating Habits 10/21/2021   Do you have any dietary restrictions? No   Do you currently binge eat (eat a large amount of food in a short time)? No   Are you an emotional eater? No   Do you get up to eat after falling asleep? No   What foods do you crave? anything chocolate,chips     Progress on Previous Goals- not met  1) Continue following low carb diet   2) Consume at least 60 grams of protein per day  3) Consume at least 48 oz of fluids per day    ADDITIONAL INFORMATION:  Physical activity- moves around a lot at work    Dining Out History Reviewed With Patient 10/21/2021   How often do you dine out? A couple of times a week.   Where do you dine out? (select all that apply) sit-down restaurants, fast food chains   What types of food do you order when you dine out? steak burger potatoe pop       Physical Activity Reviewed With Patient 2/7/2022   How often do you exercise? Never   What is the duration of your exercise (in minutes)? -   What types of exercise do you do? -   What keeps you from being more active?  Lack of Time       NUTRITION DIAGNOSIS:  Obesity r/t long history of positive energy balance aeb BMI >30 kg/m2.    INTERVENTION:  Intervention Provided/Education Provided on post-op diet guidelines, vitamins/minerals essential post-operatively, GI anatomy of bariatric surgeries, ways to help prepare for post-op diet guidelines pre-operatively, portion/calorie-control, mindful eating and sources of protein.  Patient demonstrates understanding. Provided pt with list of goals RD contact information.      Questions Reviewed With Patient 10/21/2021   How ready are you to make  changes regarding your weight? Number 1 = Not ready at all to make changes up to 10 = very ready. 10   How confident are you that you can change? 1 = Not confident that you will be successful making changes up to 10 = very confident. 10       Expected Engagement: good    GOALS:   1) Modified Liquid Diet (2 liquid meals, 1 meal, 2 snacks)  [email or web address]     Meal Replacement Shake Options:   *Protein Shake Criteria: no more than 210 Calories, at least 20 grams of protein, and less than 10 grams of sugar   [facility] smoothie (160 Calories, 20 g protein)   Premier Protein (160 Calories, 30 g protein)  Slim Fast Advanced Nutrition (180 Calories, 20 g protein)  Muscle Milk, lactose-free, 17 oz bottle (210 Calories, 30 g protein)  Integrated Supplements, no artificial sugars (110 Calories, 20 g protein)  Genepro, unflavored protein powder (60 Calories, 30 g protein)  Boost/Ensure Max (160 calories, 30 gm protein)   [employer] Core Power (170 calories, 26 gm protein)  Aldi's Elevation Protein Powder (180 calories, 30 gm protein)     Meal Replacement Bar Options:  [facility] Protein Shake (160 Calories, 15 g protein)  Quest Protein Bars (190 Calories, 20 g protein)  Built Bar (170 Calories, 15-20 g protein)  One Protein Bar (210 calories, 20 g protein)  [place] Signature Protein Bar ([employer]) (190 Calories, 21 g protein)  Pure Protein Bars (180 Calories, 21 g protein)    Low Calorie Frozen Meal:  Healthy Choice Power Bowls  Lean Cuisine  Smart Ones  Larry Fraire    Follow up: 1 month, prn    Time spent with patient: 9 minutes.  TORI BARROS, RD, LD

## 2022-12-20 NOTE — PATIENT INSTRUCTIONS
Hi Amr!    Follow-up with RD in 1 month    Thank you,    Zenia Cardona, RD, LD  If you would like to schedule or reschedule an appointment with the RD, please call [phone]    Nutrition Goals  1) Modified Liquid Diet (2 liquid meals, 1 meal, 2 snacks)  [email or web address]     Interested in working with a health ? Health coaches work with you to improve your overall health and wellbeing. They look at the whole person, and may involve discussion of different areas of life, including, but not limited to the four pillars of health (sleep, exercise, nutrition, and stress management). Discuss with your care team if you would like to start working a health .    Health Coaching-3 Pack:    $99 for three health coaching visits    Visits may be done in person or via phone    Coaching is a partnership between the  and the client; Coaches do not prescribe or diagnose    Coaching helps inspire the client to reach his/her personal goals    COMPREHENSIVE WEIGHT MANAGEMENT PROGRAM  VIRTUAL SUPPORT GROUPS    For Support Group Information:      We offer support groups for patients who are working on weight loss and considering, preparing for or have had weight loss surgery.   There is no cost for this opportunity.  You are invited to attend the?Virtual Support Groups?provided by any of the following locations:    [facility] via [employer] Teams with Amy Benavidez RN  2.   [facility] via [employer] with Donald Obrien, PhD, LP  3.   [facility] via [employer] with Cristin Baltazar RN  4.   [facility] via CareTree Teams with Cristin Belcher [facility]    The following Support Group information can also be found on our website:  [email or web address]      [facility] Weight Loss Surgery Support Group    [facility] Weight Loss Surgery Support Group  The support group is a patient-lead forum that meets monthly to share  "experiences, encouragement and education. It is open to those who have had weight loss surgery, are scheduled for surgery, and those who are considering surgery.   WHEN: This group meets on the 3rd Wednesday of each month from 5:00PM - 6:00PM virtually using Microsoft Teams.   FACILITATOR: Led by Amy Benavidez RD, LD, RN, the program's Clinical Coordinator.   TO REGISTER: Please contact the clinic via [employer] or call the nurse line directly at [phone] to inform our staff that you would like an invite sent to you and to let us know the email you would like the invite sent to. Prior to the meeting, a link with directions on how to join the meeting will be sent to you.    2022 Meetings  January 19: \"Let's Talk\" a time for the group to share.  February 16: \"Let's Talk\" a time for the group to share.  March 16: Guest Speakers: Psychologists, Lu Lees, PhD,LP and Marisol Quiles PsyD,  April 20: Guest Speaker: Health , Asmita Akhtar, [facility],CHES, CPT  May 18: Guest Speaker: Dietitian, Miguel Chambers RD, LP  Mone 15: \"Let's Talk\" a time for the group to share.  July 20: \"Let's Talk\" a time for the group to share.  August 17: TBA  September 21: TBA  October 19: Guest Speaker: Dr Dale Remy MD Pulmonologist and Sleep Medicine Physician, \"Getting a Good Night's Sleep\".  November 16: TBA  December 21: TBA    [facility] Support Groups    Connections: Bariatric Care Support Group?  This is open to all [facility] (and those external to this program) pre- and post- operative bariatric surgery patients as well as their support system.   WHEN: This group meets the 2nd Tuesday of each month from 6:30 PM - 8:00 PM virtually using Microsoft Teams.   FACILITATOR: Led by Donald Obrien, Ph.D who is a Licensed Psychologist with the [facility] Comprehensive Weight Management Program.   TO REGISTER: Please send an email to Donald Obrien, Ph.D., LP " "at?[email or web address]?if you would like an invitation to the group and to learn about using Microsoft Teams.    2022 Meetings January 11: Tash Mendoza, PharmD, Pharmacy Resident at [facility], \"Medications and Bariatric Surgery\".  February 8: Open Forum  March 8  April 12  May 10  Mone 14    Connections: Post-Operative Bariatric Surgery Support Group  This is a support group for [facility] bariatric patients (and those external to [facility]) who have had bariatric surgery and are at least 3 months post-surgery.  WHEN: This support group meets the 4th Wednesday of the month from 11:00 AM - 12:00 PM virtually using Microsoft Teams.   FACILITATOR: Led by Certified Bariatric Nurse, Cristin Baltazar RN.   TO REGISTER: Please send an email to Cristin at [email or web address] if you would like an invitation to the group and to learn about using Microsoft Teams.    2022 Meetings  January 26  February 23  March 23  April 27  May 25  Mone 22    [facility] Healthy Lifestyle Virtual Support Group    Healthy Lifestyle Virtual Support Group?  This is 60 minutes of small group guided discussion, support and resources. All are welcome who want a healthy lifestyle.  WHEN: This group meets monthly on a Friday from 12:30 PM - 1:30 PM virtually using Microsoft Teams.   FACILITATOR: Led by National Board Certified Health and , Cristin Belcher, [facility].   TO REGISTER: Please send an email to Cristin at?[email or web address] to receive monthly invites to the group or if you have any questions about having a health .  Prior to the meeting, a link with directions on how to join the meeting will be sent to you.    2022 Meetings January 21: Antonella Domingo MS, RN, CIC, CBN, \"Healthy Habits\"  February 25: Open Forum  March 18: \"Setting Limits and Boundaries\"  April 29: Sanjuanita Royal RD, \"Meal Planning Made Easy\"  May 20: Open Forum  June: To be " determined

## 2022-12-26 ENCOUNTER — TELEPHONE (OUTPATIENT)
Dept: ENDOCRINOLOGY | Facility: CLINIC | Age: 32
End: 2022-12-26

## 2022-12-26 NOTE — TELEPHONE ENCOUNTER
DENISSE and sent [employer] for scheduling return in about 4 weeks (around 1/17/2023) with Zenia Cardona RD.

## 2022-12-29 NOTE — TELEPHONE ENCOUNTER
Just want to follow up on this prescription. I see that they prescribed Phentermine. Just woundering what to do with the Rx so it is not sitting in Q. Thank You

## 2023-01-09 ENCOUNTER — VIRTUAL VISIT (OUTPATIENT)
Dept: PHARMACY | Facility: CLINIC | Age: 33
End: 2023-01-09
Payer: COMMERCIAL

## 2023-01-09 DIAGNOSIS — F32.A DEPRESSION, UNSPECIFIED DEPRESSION TYPE: Primary | ICD-10-CM

## 2023-01-09 DIAGNOSIS — E66.01 CLASS 3 SEVERE OBESITY DUE TO EXCESS CALORIES WITH SERIOUS COMORBIDITY AND BODY MASS INDEX (BMI) OF 45.0 TO 49.9 IN ADULT (H): ICD-10-CM

## 2023-01-09 DIAGNOSIS — E66.813 CLASS 3 SEVERE OBESITY DUE TO EXCESS CALORIES WITH SERIOUS COMORBIDITY AND BODY MASS INDEX (BMI) OF 45.0 TO 49.9 IN ADULT (H): ICD-10-CM

## 2023-01-09 PROCEDURE — 99207 PR NO CHARGE LOS: CPT | Performed by: PHARMACIST

## 2023-01-09 NOTE — PROGRESS NOTES
Medication Therapy Management (MTM) Encounter    ASSESSMENT:                            Medication Adherence/Access: No issues identified    Obesity:   I discussed with the patient that it is highly unlikely that any other GLP-1 would be covered without diabetes and for weight loss alone. He would benefit from continuing to work on increasing current doses.Does not have updated blood pressure to evaluate blood pressure.     Depression: Since his mood is stable and he would like to try a switch back to bupropion we will make the change at this time per discussion with Buffy WELSH.    PLAN:                            1. Start bupropion  mg daily for 4 days then increase to 300 mg once daily. (approved my Buffy WELSH) Replaces duloxetine.    Follow-up: Return in about 1 month (around 2/9/2023) for Follow up via [employer] on how bupropion is working.       SUBJECTIVE/OBJECTIVE:                          Alcides Beauchamp is a 32 year old male called for a follow-up visit.  Today's visit is a follow-up MTM visit from 5/10/22    Insurance does not cover comprehensive medication review with Medication Therapy Management (MTM). Patient declines private pay. Therefore, completed one time consult today.       Reason for visit: Weight management follow-up.    Allergies/ADRs: Reviewed in chart  Past Medical History: Reviewed in chart  Tobacco: He reports that he has quit smoking. His smoking use included cigarettes. He has never used smokeless tobacco.  Alcohol: occassionally. Hasnt drank in about a month.     Medication Adherence/Access: no issues reported    Obesity:   Saxenda 2.4 mg daily . Was taking the Saxenda but was paying out of pocket. Reports he [place] it over [place] which is cheaper. Wants to know if another medication would be covered.   Phentermine 15 mg daily. Going well. Not noting any side effects with this.   Topiramate 25 mg daily at bedtime - working on increasing    Followed by Dr. Kameron Sanchez  "seen 12/14/22 for Return Medical Weight Management. Patient is experiencing the follow side effects: nausea.    Weight loss goal of 290 lb before scheduling surgery.   Wt Readings from Last 4 Encounters:   12/14/22 306 lb 9.6 oz (139.1 kg)   12/08/22 307 lb (139.3 kg)   04/27/22 307 lb 14.4 oz (139.7 kg)   04/13/22 308 lb 3.3 oz (139.8 kg)     Estimated body mass index is 48.02 kg/m  as calculated from the following:    Height as of 12/14/22: 5' 7\" (1.702 m).    Weight as of 12/14/22: 306 lb 9.6 oz (139.1 kg).  BP Readings from Last 3 Encounters:   12/14/22 129/82   04/27/22 132/84   04/13/22 108/64       Depression/anxiety: Currently taking duloxetine 30 mg daily. Reports that he has been on this for years. Wondering if there are any alternatives that can help him lose weight. Reports that he wants to go back on bupropion. Reports that his mood is going well.     No flowsheet data found. -most recent PHQ-9 from care everywhere from 2/16/21 was 14. Did not complete today.     Today's Vitals: There were no vitals taken for this visit.  ----------------      I spent 12 minutes with this patient today. All changes were made via collaborative practice agreement with Buffy WELSH. A copy of the visit note was provided to the patient's provider(s).    A summary of these recommendations was sent via [employer].    Lex Cervantes, Pharm. D., BCACP  Medication Therapy Management Pharmacist  Direct Voicemail: [phone]      Telemedicine Visit Details  Type of service:  Telephone visit  Start Time: 10:31 am  End Time: 10:43 am     Medication Therapy Recommendations  Depression, unspecified depression type    Current Medication: DULoxetine (CYMBALTA) 30 MG capsule (Discontinued)   Rationale: More effective medication available - Ineffective medication - Effectiveness   Recommendation: Change Medication   Status: Accepted per Provider                "

## 2023-01-11 RX ORDER — BUPROPION HYDROCHLORIDE 150 MG/1
150 TABLET ORAL EVERY MORNING
Qty: 60 TABLET | Refills: 3 | Status: SHIPPED | OUTPATIENT
Start: 2023-01-11

## 2023-01-11 NOTE — PATIENT INSTRUCTIONS
"Recommendations from today's MTM visit:                                                         1. Start bupropion  mg daily for 4 days then increase to 300 mg once daily. (approved my Buffy WELSH) Replaces duloxetine.    Follow-up: Return in about 1 month (around 2/9/2023) for Follow up via [employer] on how bupropion is working.    It was great speaking with you today.  I value your experience and would be very thankful for your time in providing feedback in our clinic survey. In the next few days, you may receive an email or text message from [employer] with a link to a survey related to your  clinical pharmacist.\"     To schedule another MTM appointment, please call the clinic directly or you may call the MTM scheduling line at [phone] or toll-free at [phone].     My Clinical Pharmacist's contact information:                                                      Please feel free to contact me with any questions or concerns you have.      Lex Cervantes, Pharm. D., BCACP  Medication Therapy Management Pharmacist  Direct Voicemail: [phone]     "

## 2023-02-11 ENCOUNTER — HEALTH MAINTENANCE LETTER (OUTPATIENT)
Age: 33
End: 2023-02-11

## 2023-02-13 NOTE — PROGRESS NOTES
"Alcides Beauchamp is a 31 year old male who is being evaluated via a billable video visit.      The patient has been notified of following:     \"This video visit will be conducted via a call between you and your physician/provider. We have found that certain health care needs can be provided without the need for an in-person physical exam.  This service lets us provide the care you need with a video conversation.  If a prescription is necessary we can send it directly to your pharmacy.  If lab work is needed we can place an order for that and you can then stop by our lab to have the test done at a later time.    Video visits are billed at different rates depending on your insurance coverage.  Please reach out to your insurance provider with any questions.    If during the course of the call the physician/provider feels a video visit is not appropriate, you will not be charged for this service.\"    Patient has given verbal consent for Video visit? Yes  How would you like to obtain your AVS? MyChart  If you are dropped from the video visit, the video invite should be resent to: Send to e-mail at: [email or web address]  Will anyone else be joining your video visit? No  {If patient encounters technical issues they should call [phone]      Video-Visit Details    Type of service:  Video Visit    Video Start Time: 2:05 PM  Video End Time: 2:18 PM    Originating Location (pt. Location): Home    Distant Location (provider location):  [facility] [place]     Platform used for Video Visit: Zoomorama    During this virtual visit the patient is located in [place], patient verifies this as the location during the entirety of this visit.       Return Bariatric Nutrition Consultation Note    Reason For Visit: Nutrition Assessment    Alcides Beauchamp is a 31 year old presenting today for a return bariatric nutrition consult.   Pt is interested in laparoscopic sleeve gastrectomy or RNY with Dr. Sanchez " "expected surgery in TBD.  Patient is accompanied by self.  This is pt's second of 3 required nutrition visits prior to surgery.     Pt referred by ANITHA Johnson on February 15, 2022.  Patient with Co-morbidities of obesity including:  Type II DM no  Renal Failure no  Sleep apnea yes  Hypertension no   Dyslipidemia no  Joint pain no  Back pain no  GERD no     Support System Reviewed With Patient 10/21/2021   Who do you have in your support network that can be available to help you for the first 2 weeks after surgery? wife and family   Who can you count on for support throughout your weight loss surgery journey? wife and family       ANTHROPOMETRICS:  Estimated body mass index is 48.02 kg/m  as calculated from the following:    Height as of 2/7/22: 1.702 m (5' 7\").    Weight as of 2/7/22: 139.1 kg (306 lb 9.6 oz).     Current weight (3/9/22): 305 lbs per pt    Required weight loss goal pre-op: -20 lbs from initial consult weight (goal weight 285 lbs or less before surgery)       10/21/2021   I have tried the following methods to lose weight Weight Loss Surgery       Weight Loss Questions Reviewed With Patient 10/21/2021   How long have you been overweight? Since puberty       SUPPLEMENT INFORMATION:  Vitamin D    Saxenda    NUTRITION HISTORY:  Hx of lap band (2009). Has gained ~100 lbs in the last year. Pt has been struggling with consuming large amounts of \"junk\" food. Wife is not cooking d/t being pregnant with twins. Struggles with large portion sizes.     Pt waiting for Dr. Carnes to determine if gastric sleeve or RNY is the best option.    3/9/22: Pt states he has noticed Saxenda working. He has less cravings and a lower appetite. Unsure of best time to be taking it-has been taking right before bed. Dr. Sanchez would like to do an endoscopy before deciding which surgery to pursue. Overall has improved diet- focused on more protein and vegetables and less processed foods.    Recall Diet Questions " "Reviewed With Patient 10/21/2021   Describe what you typically consume for breakfast (typical or most recent): no breakfast   Describe what you typically consume for lunch (typical or most recent): fries burger ,pop   Describe what you typically consume for supper (typical or most recent): mozzrilla stick pop   Describe what you typically consume as snacks (typical or most recent): anything chips   How many ounces of water, or other low calorie drinks, do you drink daily (8 oz=1 glass)? 64 oz or more   How many ounces of caffeine (coffee, tea, pop) do you drink daily (8 oz=1 glass)? 32 oz   How many ounces of carbonated (pop, beer, sparkling water) drinks do you drinky daily (8 oz=1 glass)? 8 oz   How many ounces of juice, pop, sweet tea, sports drinks, protein drinks, other sweetened drinks, do you drink daily (8 oz=1 glass)? -   How many ounces of milk do you drink daily (8 oz=1 glass) 8 oz   Please indicate the type of milk: 1%   How often do you drink alcohol? Monthly or less   If you do drink alcohol, how many drinks might you have in a day? (one drink = 5 oz. wine, 1 can/bottle of beer, 1 shot liquor) 1 or 2       Eating Habits 10/21/2021   Do you have any dietary restrictions? No   Do you currently binge eat (eat a large amount of food in a short time)? No   Are you an emotional eater? No   Do you get up to eat after falling asleep? No   What foods do you crave? anything chocolate,chips     Progress on Previous Goals  Relating To Eatin) Focus on portion sizes met, continues    Portion Control Without Measuring  [email or web address]     2) 9\" Plate method (1/2 plate non-starchy vegetables/fruit, 1/4 plate lean protein, 1/4 plate whole grain starch - no more than 1/2 cup carb/meal) improving, continues  3) Eat 3 meals per day improving, continues  4) Consume 60-90 g protein per day met, continues  5) Track calories using [employer] pal (1900 calories per day) not met    Relating to dietary " supplements:  1) Start a multivitamin containing iron daily not met    ADDITIONAL INFORMATION:  Physical activity- moves around a lot at work    Dining Out History Reviewed With Patient 10/21/2021   How often do you dine out? A couple of times a week.   Where do you dine out? (select all that apply) sit-down restaurants, fast food chains   What types of food do you order when you dine out? shira allen potatoe pop       Physical Activity Reviewed With Patient 2/7/2022   How often do you exercise? Never   What is the duration of your exercise (in minutes)? -   What types of exercise do you do? -   What keeps you from being more active?  Lack of Time         NUTRITION DIAGNOSIS:  Obesity r/t long history of positive energy balance aeb BMI >30 kg/m2.    INTERVENTION:  Intervention Provided/Education Provided on post-op diet guidelines, vitamins/minerals essential post-operatively, GI anatomy of bariatric surgeries, ways to help prepare for post-op diet guidelines pre-operatively, portion/calorie-control, mindful eating and sources of protein.  Patient demonstrates understanding. Provided pt with list of goals RD contact information.      Questions Reviewed With Patient 10/21/2021   How ready are you to make changes regarding your weight? Number 1 = Not ready at all to make changes up to 10 = very ready. 10   How confident are you that you can change? 1 = Not confident that you will be successful making changes up to 10 = very confident. 10       Expected Engagement: good    GOALS:  1) Track calories using [employer] pal (1900 calories per day)   2) Work towards 3 meals per day  3) Increase physical activity as able  4) Continue focusing on lean proteins and vegetables and avoiding snacking    Relating to dietary supplements:  1) Start a multivitamin containing iron daily      Protein Sources for Weight Loss  [email or web address]     Carbohydrates  [email or web address]     Mindful  Eating  [email or web address]     Summary of Volumetrics Eating Plan  [email or web address]     Diet Guidelines after Weight Loss Surgery  [email or web address]     Seated Exercises for Arms and Legs (can be done before or after surgery)  [email or web address]    Follow up: 1 month, prn    Time spent with patient: 13 minutes.  TORI BARROS RD, LD     Closure 4 Information: This tab is for additional flaps and grafts above and beyond our usual structured repairs.  Please note if you enter information here it will not currently bill and you will need to add the billing information manually.

## 2023-03-29 ENCOUNTER — VIRTUAL VISIT (OUTPATIENT)
Dept: ENDOCRINOLOGY | Facility: CLINIC | Age: 33
End: 2023-03-29
Payer: COMMERCIAL

## 2023-03-29 ENCOUNTER — TELEPHONE (OUTPATIENT)
Dept: SURGERY | Facility: CLINIC | Age: 33
End: 2023-03-29

## 2023-03-29 VITALS — BODY MASS INDEX: 47.4 KG/M2 | WEIGHT: 302 LBS | HEIGHT: 67 IN

## 2023-03-29 DIAGNOSIS — E66.01 CLASS 3 SEVERE OBESITY DUE TO EXCESS CALORIES WITH SERIOUS COMORBIDITY AND BODY MASS INDEX (BMI) OF 45.0 TO 49.9 IN ADULT (H): ICD-10-CM

## 2023-03-29 DIAGNOSIS — E66.813 CLASS 3 SEVERE OBESITY DUE TO EXCESS CALORIES WITH SERIOUS COMORBIDITY AND BODY MASS INDEX (BMI) OF 45.0 TO 49.9 IN ADULT (H): ICD-10-CM

## 2023-03-29 PROCEDURE — 99213 OFFICE O/P EST LOW 20 MIN: CPT | Mod: 95 | Performed by: PHYSICIAN ASSISTANT

## 2023-03-29 RX ORDER — TOPIRAMATE 25 MG/1
50 TABLET, FILM COATED ORAL 2 TIMES DAILY
Qty: 120 TABLET | Refills: 1 | Status: SHIPPED | OUTPATIENT
Start: 2023-03-29 | End: 2023-06-29

## 2023-03-29 RX ORDER — SEMAGLUTIDE 0.25 MG/.5ML
0.25 INJECTION, SOLUTION SUBCUTANEOUS WEEKLY
Qty: 2 ML | Refills: 0 | Status: SHIPPED | OUTPATIENT
Start: 2023-03-29 | End: 2023-06-29

## 2023-03-29 RX ORDER — SEMAGLUTIDE 0.5 MG/.5ML
0.5 INJECTION, SOLUTION SUBCUTANEOUS WEEKLY
Qty: 2 ML | Refills: 0 | Status: SHIPPED | OUTPATIENT
Start: 2023-03-29 | End: 2023-06-29

## 2023-03-29 RX ORDER — PHENTERMINE HYDROCHLORIDE 15 MG/1
30 CAPSULE ORAL EVERY MORNING
Qty: 60 CAPSULE | Refills: 1 | Status: SHIPPED | OUTPATIENT
Start: 2023-03-29 | End: 2023-06-29

## 2023-03-29 NOTE — LETTER
3/29/2023       RE: Alcides Beauchamp  [address]  [facility] [address]     Dear Colleague,    Thank you for referring your patient, Alcides Beauchamp, to the [facility] [place] at [facility]. Please see a copy of my visit note below.    Alcides Beauchamp is a 32 year old who is being evaluated via a billable telephone visit.      What phone number would you like to be contacted at? [phone]  How would you like to obtain your AVS? MyChart  Phone call duration:     During this telephone visit the patient is located in [place], patient verifies this as the location during the entirety of this visit.       Debi Mendez CMA 3/29/2023      8:14 AM      Return Medical Weight Management Note     Alcides Beauchamp  MRN:  [id number]  :  1990  REY:  3/29/2023    Dear Sanjay Farrell MD,    I had the pleasure of seeing your patient Alcides Beauchamp. He is a 32 year old male who I am continuing to see for treatment of obesity related to:       10/21/2021   I have the following health issues associated with obesity: Heart Disease, High Blood Pressure       Assessment & Plan   Problem List Items Addressed This Visit        Endocrine Diagnoses    Class 3 severe obesity due to excess calories with serious comorbidity and body mass index (BMI) of 45.0 to 49.9 in adult (H)    Relevant Medications    phentermine (ADIPEX-P) 15 MG capsule    topiramate (TOPAMAX) 25 MG tablet    Semaglutide-Weight Management (WEGOVY) 0.25 MG/0.5ML pen    Semaglutide-Weight Management (WEGOVY) 0.5 MG/0.5ML pen        Hx of lap band removal. Planning for sleeve or possible gastric bypass if needed with Dr Sanchez  Working on preop weight loss.  Today 302 and needs 290 lbs to schedule surgery  Increase phentermine to 30mg daily  Increase topiramate to 50mg twice daily  Send Wegovy to see if insurance changes and now covered possibly    Follow up plan:  RD soon and monthly  MTM in 4-6  weeks if starting [place] HaBuffy in 3 months return [facility] virtual visit    INTERVAL HISTORY:  Pre bariatric surgery. Planning for sleeve with Dr Sanchez  Needs preop weight loss 290 to schedule surgery  Doing well on topiramate 75mg and phentermine  Was taking saxenda in the past that he got from [facility]. Has been off for 2 months. No covered by insurance.    CURRENT WEIGHT:   302 lbs 0 oz    Initial Weight (lbs): 320 lbs  Last Visits Weight: 139.1 kg (306 lb 9.6 oz)  Cumulative weight loss (lbs): 18  Weight Loss Percentage: 5.63%    Changes and Difficulties 3/29/2023   I have made the following changes to my diet since my last visit: None   With regards to my diet, I am still struggling with: None   I have made the following changes to my activity/exercise since my last visit: None   With regards to my activity/exercise, I am still struggling with: Lack of time         MEDICATIONS:   Current Outpatient Medications   Medication Sig Dispense Refill     buPROPion (WELLBUTRIN XL) 150 MG 24 hr tablet Take 1 tablet (150 mg) by mouth every morning For 4 days then increase to 2 tablets (300 mg) thereafter. Replaces duloxetine. 60 tablet 3     phentermine (ADIPEX-P) 15 MG capsule Take 2 capsules (30 mg) by mouth every morning 60 capsule 1     Semaglutide-Weight Management (WEGOVY) 0.25 MG/0.5ML pen Inject 0.25 mg Subcutaneous once a week 2 mL 0     Semaglutide-Weight Management (WEGOVY) 0.5 MG/0.5ML pen Inject 0.5 mg Subcutaneous once a week 2 mL 0     topiramate (TOPAMAX) 25 MG tablet Take 2 tablets (50 mg) by mouth 2 times daily 120 tablet 1       Weight Loss Medication History Reviewed With Patient 3/29/2023   Which weight loss medications are you currently taking on a regular basis? Saxenda (liraglutide), Topamax (topiramate)   If you are not taking a weight loss medication that was prescribed to you, please indicate why: -   Are you having any side effects from the weight loss medication that we have prescribed you? -    If you are having side effects please describe: -       Lab on 11/25/2022   Component Date Value Ref Range Status     25 OH Vitamin D2 11/25/2022 8  ug/L Final     25 OH Vitamin D3 11/25/2022 17  ug/L Final     25 OH Vit D Total 11/25/2022 25  20 - 75 ug/L Final    Season, race, dietary intake, and treatment affect the concentration of 25-hydroxy-Vitamin D. Values may decrease during winter months and increase during summer months. Values 20-29 ug/L may indicate Vitamin D insufficiency and values <20 ug/L may indicate Vitamin D deficiency.     WBC Count 11/25/2022 9.8  4.0 - 11.0 10e3/uL Final     RBC Count 11/25/2022 5.74  4.40 - 5.90 10e6/uL Final     Hemoglobin 11/25/2022 14.7  13.3 - 17.7 g/dL Final     Hematocrit 11/25/2022 45.5  40.0 - 53.0 % Final     MCV 11/25/2022 79  78 - 100 fL Final     MCH 11/25/2022 25.6 (L)  26.5 - 33.0 pg Final     MCHC 11/25/2022 32.3  31.5 - 36.5 g/dL Final     RDW 11/25/2022 13.6  10.0 - 15.0 % Final     Platelet Count 11/25/2022 328  150 - 450 10e3/uL Final     Sodium 11/25/2022 140  136 - 145 mmol/L Final     Potassium 11/25/2022 4.1  3.4 - 5.3 mmol/L Final     Chloride 11/25/2022 105  98 - 107 mmol/L Final     Carbon Dioxide (CO2) 11/25/2022 23  22 - 29 mmol/L Final     Anion Gap 11/25/2022 12  7 - 15 mmol/L Final     Urea Nitrogen 11/25/2022 8.4  6.0 - 20.0 mg/dL Final     Creatinine 11/25/2022 0.72  0.67 - 1.17 mg/dL Final     Calcium 11/25/2022 9.9  8.6 - 10.0 mg/dL Final     Glucose 11/25/2022 92  70 - 99 mg/dL Final     Alkaline Phosphatase 11/25/2022 90  40 - 129 U/L Final     AST 11/25/2022 24  10 - 50 U/L Final     ALT 11/25/2022 48  10 - 50 U/L Final     Protein Total 11/25/2022 7.5  6.4 - 8.3 g/dL Final     Albumin 11/25/2022 4.5  3.5 - 5.2 g/dL Final     Bilirubin Total 11/25/2022 0.4  <=1.2 mg/dL Final     GFR Estimate 11/25/2022 >90  >60 mL/min/1.73m2 Final    Effective December 21, 2021 eGFRcr in adults is calculated using the 2021 CKD-EPI creatinine equation  which includes age and gender (Mckay faye al., NEJ, DOI: 10.1056/HBSYvl2024606)     Cholesterol 11/25/2022 196  <200 mg/dL Final     Triglycerides 11/25/2022 143  <150 mg/dL Final     Direct Measure HDL 11/25/2022 44  >=40 mg/dL Final     LDL Cholesterol Calculated 11/25/2022 123 (H)  <=100 mg/dL Final     Non HDL Cholesterol 11/25/2022 152 (H)  <130 mg/dL Final     Hemoglobin A1C 11/25/2022 5.5  <5.7 % Final    Normal <5.7%   Prediabetes 5.7-6.4%    Diabetes 6.5% or higher     Note: Adopted from ADA consensus guidelines.     Parathyroid Hormone Intact 11/25/2022 26  15 - 65 pg/mL Final     Cotinine Confirm 11/25/2022 <15  ng/mL Final     Nicotine Confirmation Urine 11/25/2022 <15  ng/mL Final    Comment: INTERPRETIVE INFORMATION: Nicotine and Metabolites,                             Urine, Quantitative    Methodology: Quantitative Liquid Chromatography-Tandem Mass   Spectrometry    Positive cutoff:  Nicotine  15 ng/mL  Cotinine  15 ng/mL  3-OH-Cotinine 50 ng/mL  Anabasine        5 ng/mL    For medical purposes only; not valid for forensic use.     This test is designed to evaluate recent use of   nicotine-containing products.  Passive and active exposure   cannot be discriminated definitively, although a cutoff of   100 ng/mL cotinine is frequently used for surgery   qualification purposes.  For smoking cessation programs or   compliance testing, the absence of expected drug(s) and/or   drug metabolite(s) may indicate non-compliance,   inappropriate timing of specimen collection relative to   drug administration, poor drug absorption,   diluted/adulterated urine, or limitations of testing. The   concentration value must be greater than or equal to the   cutoff to be reported as                            positive. Anabasine is included as   a biomarker of tobacco use, versus nicotine replacement.    Interpretive questions should be directed to the   laboratory.     This test was developed and its performance  "characteristics   determined by [facility]. It has not been cleared or   approved by the US Food and Drug Administration. This test   was performed in a CLIA certified laboratory and is   intended for clinical purposes.  Performed By: [facility]  [address] [place] [id number]  : Medardo Thorne MD, PhD     5-LU-Vuvtisbv, Urn, Quant 11/25/2022 <50  ng/mL Final     Anabasine, Urn, Quant 11/25/2022 <5  ng/mL Final       PHYSICAL EXAM:  Objective     Ht 1.702 m (5' 7\")   Wt 137 kg (302 lb)   BMI 47.30 kg/m             Vitals:  No vitals were obtained today due to virtual visit.      Sincerely,    Buffy Forbes PA-C      10 minutes spent by me on the date of the encounter doing chart review, history and exam, documentation and further activities per the note    "

## 2023-03-29 NOTE — PROGRESS NOTES
Alcides Beauchamp is a 32 year old who is being evaluated via a billable telephone visit.      What phone number would you like to be contacted at? [phone]  How would you like to obtain your AVS? Mita  Phone call duration:     During this telephone visit the patient is located in [place], patient verifies this as the location during the entirety of this visit.       Debi Mendez CMA 3/29/2023      8:14 AM      Return Medical Weight Management Note     Alcides Beauchamp  MRN:  [id number]  :  1990  REY:  3/29/2023    Dear Sanjay Farrell MD,    I had the pleasure of seeing your patient Alcides Beauchamp. He is a 32 year old male who I am continuing to see for treatment of obesity related to:       10/21/2021   I have the following health issues associated with obesity: Heart Disease, High Blood Pressure       Assessment & Plan   Problem List Items Addressed This Visit        Endocrine Diagnoses    Class 3 severe obesity due to excess calories with serious comorbidity and body mass index (BMI) of 45.0 to 49.9 in adult (H)    Relevant Medications    phentermine (ADIPEX-P) 15 MG capsule    topiramate (TOPAMAX) 25 MG tablet    Semaglutide-Weight Management (WEGOVY) 0.25 MG/0.5ML pen    Semaglutide-Weight Management (WEGOVY) 0.5 MG/0.5ML pen        Hx of lap band removal. Planning for sleeve or possible gastric bypass if needed with Dr Sanchez  Working on preop weight loss.  Today 302 and needs 290 lbs to schedule surgery  Increase phentermine to 30mg daily  Increase topiramate to 50mg twice daily  Send Wegovy to see if insurance changes and now covered possibly    Follow up plan:  RD soon and monthly  MTM in 4-6 weeks if starting Wegovy  Buffy in 3 months return [facility] virtual visit    INTERVAL HISTORY:  Pre bariatric surgery. Planning for sleeve with Dr Sanchez  Needs preop weight loss 290 to schedule surgery  Doing well on topiramate 75mg and phentermine  Was taking saxenda in the past that he got from [place]. Has  been off for 2 months. No covered by insurance.    CURRENT WEIGHT:   302 lbs 0 oz    Initial Weight (lbs): 320 lbs  Last Visits Weight: 139.1 kg (306 lb 9.6 oz)  Cumulative weight loss (lbs): 18  Weight Loss Percentage: 5.63%    Changes and Difficulties 3/29/2023   I have made the following changes to my diet since my last visit: None   With regards to my diet, I am still struggling with: None   I have made the following changes to my activity/exercise since my last visit: None   With regards to my activity/exercise, I am still struggling with: Lack of time         MEDICATIONS:   Current Outpatient Medications   Medication Sig Dispense Refill     buPROPion (WELLBUTRIN XL) 150 MG 24 hr tablet Take 1 tablet (150 mg) by mouth every morning For 4 days then increase to 2 tablets (300 mg) thereafter. Replaces duloxetine. 60 tablet 3     phentermine (ADIPEX-P) 15 MG capsule Take 2 capsules (30 mg) by mouth every morning 60 capsule 1     Semaglutide-Weight Management (WEGOVY) 0.25 MG/0.5ML pen Inject 0.25 mg Subcutaneous once a week 2 mL 0     Semaglutide-Weight Management (WEGOVY) 0.5 MG/0.5ML pen Inject 0.5 mg Subcutaneous once a week 2 mL 0     topiramate (TOPAMAX) 25 MG tablet Take 2 tablets (50 mg) by mouth 2 times daily 120 tablet 1       Weight Loss Medication History Reviewed With Patient 3/29/2023   Which weight loss medications are you currently taking on a regular basis? Saxenda (liraglutide), Topamax (topiramate)   If you are not taking a weight loss medication that was prescribed to you, please indicate why: -   Are you having any side effects from the weight loss medication that we have prescribed you? -   If you are having side effects please describe: -       Lab on 11/25/2022   Component Date Value Ref Range Status     25 OH Vitamin D2 11/25/2022 8  ug/L Final     25 OH Vitamin D3 11/25/2022 17  ug/L Final     25 OH Vit D Total 11/25/2022 25  20 - 75 ug/L Final    Season, race, dietary intake, and treatment  affect the concentration of 25-hydroxy-Vitamin D. Values may decrease during winter months and increase during summer months. Values 20-29 ug/L may indicate Vitamin D insufficiency and values <20 ug/L may indicate Vitamin D deficiency.     WBC Count 11/25/2022 9.8  4.0 - 11.0 10e3/uL Final     RBC Count 11/25/2022 5.74  4.40 - 5.90 10e6/uL Final     Hemoglobin 11/25/2022 14.7  13.3 - 17.7 g/dL Final     Hematocrit 11/25/2022 45.5  40.0 - 53.0 % Final     MCV 11/25/2022 79  78 - 100 fL Final     MCH 11/25/2022 25.6 (L)  26.5 - 33.0 pg Final     MCHC 11/25/2022 32.3  31.5 - 36.5 g/dL Final     RDW 11/25/2022 13.6  10.0 - 15.0 % Final     Platelet Count 11/25/2022 328  150 - 450 10e3/uL Final     Sodium 11/25/2022 140  136 - 145 mmol/L Final     Potassium 11/25/2022 4.1  3.4 - 5.3 mmol/L Final     Chloride 11/25/2022 105  98 - 107 mmol/L Final     Carbon Dioxide (CO2) 11/25/2022 23  22 - 29 mmol/L Final     Anion Gap 11/25/2022 12  7 - 15 mmol/L Final     Urea Nitrogen 11/25/2022 8.4  6.0 - 20.0 mg/dL Final     Creatinine 11/25/2022 0.72  0.67 - 1.17 mg/dL Final     Calcium 11/25/2022 9.9  8.6 - 10.0 mg/dL Final     Glucose 11/25/2022 92  70 - 99 mg/dL Final     Alkaline Phosphatase 11/25/2022 90  40 - 129 U/L Final     AST 11/25/2022 24  10 - 50 U/L Final     ALT 11/25/2022 48  10 - 50 U/L Final     Protein Total 11/25/2022 7.5  6.4 - 8.3 g/dL Final     Albumin 11/25/2022 4.5  3.5 - 5.2 g/dL Final     Bilirubin Total 11/25/2022 0.4  <=1.2 mg/dL Final     GFR Estimate 11/25/2022 >90  >60 mL/min/1.73m2 Final    Effective December 21, 2021 eGFRcr in adults is calculated using the 2021 CKD-EPI creatinine equation which includes age and gender (Mckay et al., NEJM, DOI: 10.1056/LIXNfq1688871)     Cholesterol 11/25/2022 196  <200 mg/dL Final     Triglycerides 11/25/2022 143  <150 mg/dL Final     Direct Measure HDL 11/25/2022 44  >=40 mg/dL Final     LDL Cholesterol Calculated 11/25/2022 123 (H)  <=100 mg/dL Final     Non HDL  Cholesterol 11/25/2022 152 (H)  <130 mg/dL Final     Hemoglobin A1C 11/25/2022 5.5  <5.7 % Final    Normal <5.7%   Prediabetes 5.7-6.4%    Diabetes 6.5% or higher     Note: Adopted from ADA consensus guidelines.     Parathyroid Hormone Intact 11/25/2022 26  15 - 65 pg/mL Final     Cotinine Confirm 11/25/2022 <15  ng/mL Final     Nicotine Confirmation Urine 11/25/2022 <15  ng/mL Final    Comment: INTERPRETIVE INFORMATION: Nicotine and Metabolites,                             Urine, Quantitative    Methodology: Quantitative Liquid Chromatography-Tandem Mass   Spectrometry    Positive cutoff:  Nicotine  15 ng/mL  Cotinine  15 ng/mL  3-OH-Cotinine 50 ng/mL  Anabasine        5 ng/mL    For medical purposes only; not valid for forensic use.     This test is designed to evaluate recent use of   nicotine-containing products.  Passive and active exposure   cannot be discriminated definitively, although a cutoff of   100 ng/mL cotinine is frequently used for surgery   qualification purposes.  For smoking cessation programs or   compliance testing, the absence of expected drug(s) and/or   drug metabolite(s) may indicate non-compliance,   inappropriate timing of specimen collection relative to   drug administration, poor drug absorption,   diluted/adulterated urine, or limitations of testing. The   concentration value must be greater than or equal to the   cutoff to be reported as                            positive. Anabasine is included as   a biomarker of tobacco use, versus nicotine replacement.    Interpretive questions should be directed to the   laboratory.     This test was developed and its performance characteristics   determined by [facility]. It has not been cleared or   approved by the US Food and Drug Administration. This test   was performed in a CLIA certified laboratory and is   intended for clinical purposes.  Performed By: [facility]  [address] [place] [id number]  [facility]  "Director: Medardo Thorne MD, PhD     2-LG-Zmhpgsrq, Urn, Quant 11/25/2022 <50  ng/mL Final     Anabasine, Urn, Quant 11/25/2022 <5  ng/mL Final       PHYSICAL EXAM:  Objective    Ht 1.702 m (5' 7\")   Wt 137 kg (302 lb)   BMI 47.30 kg/m             Vitals:  No vitals were obtained today due to virtual visit.      Sincerely,    Buffy Forbes PA-C      10 minutes spent by me on the date of the encounter doing chart review, history and exam, documentation and further activities per the note    "

## 2023-03-29 NOTE — NURSING NOTE
"(   Chief Complaint   Patient presents with     Follow Up    )    ( Weight: 302 lb (pt reported) )  ( Height: 5' 7\" (pt reported) )  ( BMI (Calculated): 47.3 )  (   )  (   )  (   )  (   )  (   )  (   )    (   )  (   )  (   )  (   )  (   )  (   )  (   )    (   Patient Active Problem List   Diagnosis     Class 3 severe obesity due to excess calories with serious comorbidity and body mass index (BMI) of 45.0 to 49.9 in adult (H)     History of removal of laparoscopic gastric banding device     Rash    )  (   Current Outpatient Medications   Medication Sig Dispense Refill     buPROPion (WELLBUTRIN XL) 150 MG 24 hr tablet Take 1 tablet (150 mg) by mouth every morning For 4 days then increase to 2 tablets (300 mg) thereafter. Replaces duloxetine. 60 tablet 3     liraglutide - Weight Management (SAXENDA) 18 MG/3ML pen Inject 3 mg Subcutaneous daily 15 mL 1     phentermine (ADIPEX-P) 15 MG capsule Take 1 capsule (15 mg) by mouth every morning 30 capsule 1     topiramate (TOPAMAX) 25 MG tablet 25mg at bedtime for week 1, 50mg at bedtime for 1 week, and 75mg at bedtime thereafter 90 tablet 1    )  ( Diabetes Eval:    )    ( Pain Eval:  Data Unavailable )    ( Wound Eval:       )    (   History   Smoking Status     Former     Types: Cigarettes   Smokeless Tobacco     Never    )    ( Signed By:  Debi Mendez; March 29, 2023; 8:17 AM )    "

## 2023-03-29 NOTE — Clinical Note
VEENA I saw this pt today. He saw Dr Daniel Vega and he told him when he hits 290 he can schedule surgery and hopefully do asap.  He is 302 today.  We are adjusting some meds and he is going to start mod liq diet.  He hasn't seen RD in a bit so going to see asap and monthly.  Rhoda can you see if there is anything else he is missing? Thanks

## 2023-03-29 NOTE — TELEPHONE ENCOUNTER
PA Initiation    Medication: Wegovy 0.25MG/0.5ML auto-injectors  Insurance Company: [employer] [place] - Phone [phone] Fax [phone]  Pharmacy Filling the Rx:    Filling Pharmacy Phone:    Filling Pharmacy Fax:    Start Date: 3/29/2023

## 2023-03-30 NOTE — TELEPHONE ENCOUNTER
PRIOR AUTHORIZATION DENIED    Medication: Wegovy 0.25MG/0.5ML auto-injectors    Denial Date: 3/30/2023    Denial Rational:

## 2023-03-30 NOTE — PROGRESS NOTES
"Alcides Beauchamp is a 31 year old male who is being evaluated via a billable telephone visit.     The patient has been notified of following:     \"This telephone visit will be conducted via a call between you and your physician/provider. We have found that certain health care needs can be provided without the need for a physical exam.  This service lets us provide the care you need with a short phone conversation.  If a prescription is necessary we can send it directly to your pharmacy.  If lab work is needed we can place an order for that and you can then stop by our lab to have the test done at a later time.    Telephone visits are billed at different rates depending on your insurance coverage. During this emergency period, for some insurers they may be billed the same as an in-person visit.  Please reach out to your insurance provider with any questions.    If during the course of the call the physician/provider feels a telephone visit is not appropriate, you will not be charged for this service.\"    Patient has given verbal consent for Telephone visit?  Yes    How would you like to obtain your AVS? Mychart    Phone call duration: 11 minutes    During this virtual visit the patient is located in [place], patient verifies this as the location during the entirety of this visit.        Return Bariatric Nutrition Consultation Note    Reason For Visit: Nutrition Assessment    Alcides Beauchamp is a 31 year old presenting today for a return bariatric nutrition consult.   Pt is interested in laparoscopic sleeve gastrectomy or RNY with Dr. Sanchez expected surgery in [place].  Patient is accompanied by self.  Pt has met required nutrition visits prior to surgery.     Pt referred by ANITHA Johnson on February 15, 2022.  Patient with Co-morbidities of obesity including:  Type II DM no  Renal Failure no  Sleep apnea yes  Hypertension no   Dyslipidemia no  Joint pain no  Back pain no  GERD no     Support System Reviewed With Patient " "10/21/2021   Who do you have in your support network that can be available to help you for the first 2 weeks after surgery? wife and family   Who can you count on for support throughout your weight loss surgery journey? wife and family       ANTHROPOMETRICS:  Estimated body mass index is 47.3 kg/m  as calculated from the following:    Height as of 3/29/23: 1.702 m (5' 7\").    Weight as of 3/29/23: 137 kg (302 lb).     Current weight (3/31/23): 303 lbs per pt     Required weight loss goal pre-op: -20 lbs from initial consult weight (goal weight 287 lbs or less before surgery)       10/21/2021   I have tried the following methods to lose weight Weight Loss Surgery       Weight Loss Questions Reviewed With Patient 10/21/2021   How long have you been overweight? Since puberty       SUPPLEMENT INFORMATION:  Vitamin D    Saxenda    NUTRITION HISTORY:  Hx of lap band (2009). Has gained ~100 lbs in the last year. Pt has been struggling with consuming large amounts of \"junk\" food. Wife is not cooking d/t being pregnant with twins. Struggles with large portion sizes.     Pt waiting for Dr. Carnes to determine if gastric sleeve or RNY is the best option.    3/9/22: Pt states he has noticed Saxenda working. He has less cravings and a lower appetite. Unsure of best time to be taking it-has been taking right before bed. Dr. Sanchez would like to do an endoscopy before deciding which surgery to pursue. Overall has improved diet- focused on more protein and vegetables and less processed foods.    4/1/22: Pt reports he has stopped taking Saxenda d/t sleepiness and body aches. Trying to decrease carbohydrates in his meals. Still only eating one meal per day in the evenings and snacking occasionally. States he does not think he will be able to change his diet habits and would like to do a modified liquid diet prior to surgery to reach his goal weight.    5/2/22: Recently saw Dr. Carnes and is going to move forward with " "surgery. Required to lose 20 lbs prior to surgery. Currently taking Saxenda and trying to have smaller portions. Pt wants to utilize the modified liquid diet once surgery date is closer.     6/2/22: Pt has noticed that medication has been working. Has reduced portion sizes. Wants to try modified liquid diet.    10/7/22: Pt has not been doing well with weight loss goals recently. Has gotten \"off track\" and has not been focusing on healthy meals. Was not able to start modified liquid diet but plans to this month.    11/14/22: Pt has lost weight since last visit. Started on Saxenda-reports he is not feeling well/nauseous. Following a very low carb diet.     12/20/22: Pt has not lost weight since last visit- has been struggling with the holidays. Wanting to start modified liquid diet next week.     3/31/23: Pt increased topiramate and phentermine. Planning to do the modified liquid diet. Already completed it once and did not lose very much weight.     Recall Diet Questions Reviewed With Patient 10/21/2021   Describe what you typically consume for breakfast (typical or most recent): no breakfast   Describe what you typically consume for lunch (typical or most recent): fries burger ,pop   Describe what you typically consume for supper (typical or most recent): mozzrilla stick pop   Describe what you typically consume as snacks (typical or most recent): anything chips   How many ounces of water, or other low calorie drinks, do you drink daily (8 oz=1 glass)? 64 oz or more   How many ounces of caffeine (coffee, tea, pop) do you drink daily (8 oz=1 glass)? 32 oz   How many ounces of carbonated (pop, beer, sparkling water) drinks do you drinky daily (8 oz=1 glass)? 8 oz   How many ounces of juice, pop, sweet tea, sports drinks, protein drinks, other sweetened drinks, do you drink daily (8 oz=1 glass)? -   How many ounces of milk do you drink daily (8 oz=1 glass) 8 oz   Please indicate the type of milk: 1%   How often do you " drink alcohol? Monthly or less   If you do drink alcohol, how many drinks might you have in a day? (one drink = 5 oz. wine, 1 can/bottle of beer, 1 shot liquor) 1 or 2       Eating Habits 10/21/2021   Do you have any dietary restrictions? No   Do you currently binge eat (eat a large amount of food in a short time)? No   Are you an emotional eater? No   Do you get up to eat after falling asleep? No   What foods do you crave? anything chocolate,chips     Progress on Previous Goals- not met  1) Modified Liquid Diet (2 liquid meals, 1 meal, 2 snacks)  [email or web address]     ADDITIONAL INFORMATION:  Physical activity- moves around a lot at work    Dining Out History Reviewed With Patient 10/21/2021   How often do you dine out? A couple of times a week.   Where do you dine out? (select all that apply) sit-down restaurants, fast food chains   What types of food do you order when you dine out? steak burger potatoe pop       Physical Activity Reviewed With Patient 2/7/2022   How often do you exercise? Never   What is the duration of your exercise (in minutes)? -   What types of exercise do you do? -   What keeps you from being more active? Lack of Time       NUTRITION DIAGNOSIS:  Obesity r/t long history of positive energy balance aeb BMI >30 kg/m2.    INTERVENTION:  Intervention Provided/Education Provided on post-op diet guidelines, vitamins/minerals essential post-operatively, GI anatomy of bariatric surgeries, ways to help prepare for post-op diet guidelines pre-operatively, portion/calorie-control, mindful eating and sources of protein.  Patient demonstrates understanding. Provided pt with list of goals RD contact information.      Questions Reviewed With Patient 10/21/2021   How ready are you to make changes regarding your weight? Number 1 = Not ready at all to make changes up to 10 = very ready. 10   How confident are you that you can change? 1 = Not confident that you will be successful making changes up  to 10 = very confident. 10       Expected Engagement: good    GOALS:   1) Review powerpoint sent to email     Meal Replacement Shake Options:   *Protein Shake Criteria: no more than 210 Calories, at least 20 grams of protein, and less than 10 grams of sugar   [facility] smoothie (160 Calories, 20 g protein)   Premier Protein (160 Calories, 30 g protein)  Slim Fast Advanced Nutrition (180 Calories, 20 g protein)  Muscle Milk, lactose-free, 17 oz bottle (210 Calories, 30 g protein)  Integrated Supplements, no artificial sugars (110 Calories, 20 g protein)  Genepro, unflavored protein powder (60 Calories, 30 g protein)  Boost/Ensure Max (160 calories, 30 gm protein)   [employer] Core Power (170 calories, 26 gm protein)  Aldi's Elevation Protein Powder (180 calories, 30 gm protein)     Meal Replacement Bar Options:  [facility] Protein Shake (160 Calories, 15 g protein)  Quest Protein Bars (190 Calories, 20 g protein)  Built Bar (170 Calories, 15-20 g protein)  One Protein Bar (210 calories, 20 g protein)  [place] Signature Protein Bar ([employer]) (190 Calories, 21 g protein)  Pure Protein Bars (180 Calories, 21 g protein)    Low Calorie Frozen Meal:  Healthy Choice Power Bowls  Lean Cuisine  Smart Ones  Larry Fraire    Follow up: 1 month, prn    Time spent with patient: 11 minutes.  TORI BARROS RD, LD

## 2023-03-31 ENCOUNTER — VIRTUAL VISIT (OUTPATIENT)
Dept: ENDOCRINOLOGY | Facility: CLINIC | Age: 33
End: 2023-03-31

## 2023-03-31 DIAGNOSIS — Z71.3 NUTRITIONAL COUNSELING: Primary | ICD-10-CM

## 2023-03-31 DIAGNOSIS — E66.9 OBESITY: ICD-10-CM

## 2023-03-31 PROCEDURE — 99207 PR NO CHARGE LOS: CPT | Mod: 95 | Performed by: DIETITIAN, REGISTERED

## 2023-03-31 PROCEDURE — 97803 MED NUTRITION INDIV SUBSEQ: CPT | Mod: 95 | Performed by: DIETITIAN, REGISTERED

## 2023-03-31 NOTE — LETTER
"3/31/2023       RE: Alcides Beauchamp  [address]  [place] [address]     Dear Colleague,    Thank you for referring your patient, Alcides Beauchamp, to the [facility] [place] at [facility]. Please see a copy of my visit note below.    Alcides Beauchamp is a 31 year old male who is being evaluated via a billable telephone visit.     The patient has been notified of following:     \"This telephone visit will be conducted via a call between you and your physician/provider. We have found that certain health care needs can be provided without the need for a physical exam.  This service lets us provide the care you need with a short phone conversation.  If a prescription is necessary we can send it directly to your pharmacy.  If lab work is needed we can place an order for that and you can then stop by our lab to have the test done at a later time.    Telephone visits are billed at different rates depending on your insurance coverage. During this emergency period, for some insurers they may be billed the same as an in-person visit.  Please reach out to your insurance provider with any questions.    If during the course of the call the physician/provider feels a telephone visit is not appropriate, you will not be charged for this service.\"    Patient has given verbal consent for Telephone visit?  Yes    How would you like to obtain your AVS? Linda    Phone call duration: 11 minutes    During this virtual visit the patient is located in [place], patient verifies this as the location during the entirety of this visit.        Return Bariatric Nutrition Consultation Note    Reason For Visit: Nutrition Assessment    Alcides Beauchamp is a 31 year old presenting today for a return bariatric nutrition consult.   Pt is interested in laparoscopic sleeve gastrectomy or RNY with Dr. Sanchez expected surgery in [place].  Patient is accompanied by self.  Pt has met required " "nutrition visits prior to surgery.     Pt referred by ANITHA Johnson on February 15, 2022.  Patient with Co-morbidities of obesity including:  Type II DM no  Renal Failure no  Sleep apnea yes  Hypertension no   Dyslipidemia no  Joint pain no  Back pain no  GERD no     Support System Reviewed With Patient 10/21/2021   Who do you have in your support network that can be available to help you for the first 2 weeks after surgery? wife and family   Who can you count on for support throughout your weight loss surgery journey? wife and family       ANTHROPOMETRICS:  Estimated body mass index is 47.3 kg/m  as calculated from the following:    Height as of 3/29/23: 1.702 m (5' 7\").    Weight as of 3/29/23: 137 kg (302 lb).     Current weight (3/31/23): 303 lbs per pt     Required weight loss goal pre-op: -20 lbs from initial consult weight (goal weight 287 lbs or less before surgery)       10/21/2021   I have tried the following methods to lose weight Weight Loss Surgery       Weight Loss Questions Reviewed With Patient 10/21/2021   How long have you been overweight? Since puberty       SUPPLEMENT INFORMATION:  Vitamin D    Saxenda    NUTRITION HISTORY:  Hx of lap band (2009). Has gained ~100 lbs in the last year. Pt has been struggling with consuming large amounts of \"junk\" food. Wife is not cooking d/t being pregnant with twins. Struggles with large portion sizes.     Pt waiting for Dr. Carnes to determine if gastric sleeve or RNY is the best option.    3/9/22: Pt states he has noticed Saxenda working. He has less cravings and a lower appetite. Unsure of best time to be taking it-has been taking right before bed. Dr. Sanchez would like to do an endoscopy before deciding which surgery to pursue. Overall has improved diet- focused on more protein and vegetables and less processed foods.    4/1/22: Pt reports he has stopped taking Saxenda d/t sleepiness and body aches. Trying to decrease carbohydrates in his meals. " "Still only eating one meal per day in the evenings and snacking occasionally. States he does not think he will be able to change his diet habits and would like to do a modified liquid diet prior to surgery to reach his goal weight.    5/2/22: Recently saw Dr. Carnes and is going to move forward with surgery. Required to lose 20 lbs prior to surgery. Currently taking Saxenda and trying to have smaller portions. Pt wants to utilize the modified liquid diet once surgery date is closer.     6/2/22: Pt has noticed that medication has been working. Has reduced portion sizes. Wants to try modified liquid diet.    10/7/22: Pt has not been doing well with weight loss goals recently. Has gotten \"off track\" and has not been focusing on healthy meals. Was not able to start modified liquid diet but plans to this month.    11/14/22: Pt has lost weight since last visit. Started on Saxenda-reports he is not feeling well/nauseous. Following a very low carb diet.     12/20/22: Pt has not lost weight since last visit- has been struggling with the holidays. Wanting to start modified liquid diet next week.     3/31/23: Pt increased topiramate and phentermine. Planning to do the modified liquid diet. Already completed it once and did not lose very much weight.     Recall Diet Questions Reviewed With Patient 10/21/2021   Describe what you typically consume for breakfast (typical or most recent): no breakfast   Describe what you typically consume for lunch (typical or most recent): fries burger ,pop   Describe what you typically consume for supper (typical or most recent): mozzrilla stick pop   Describe what you typically consume as snacks (typical or most recent): anything chips   How many ounces of water, or other low calorie drinks, do you drink daily (8 oz=1 glass)? 64 oz or more   How many ounces of caffeine (coffee, tea, pop) do you drink daily (8 oz=1 glass)? 32 oz   How many ounces of carbonated (pop, beer, sparkling water) " drinks do you drinky daily (8 oz=1 glass)? 8 oz   How many ounces of juice, pop, sweet tea, sports drinks, protein drinks, other sweetened drinks, do you drink daily (8 oz=1 glass)? -   How many ounces of milk do you drink daily (8 oz=1 glass) 8 oz   Please indicate the type of milk: 1%   How often do you drink alcohol? Monthly or less   If you do drink alcohol, how many drinks might you have in a day? (one drink = 5 oz. wine, 1 can/bottle of beer, 1 shot liquor) 1 or 2       Eating Habits 10/21/2021   Do you have any dietary restrictions? No   Do you currently binge eat (eat a large amount of food in a short time)? No   Are you an emotional eater? No   Do you get up to eat after falling asleep? No   What foods do you crave? anything chocolate,chips     Progress on Previous Goals- not met  1) Modified Liquid Diet (2 liquid meals, 1 meal, 2 snacks)  [email or web address]     ADDITIONAL INFORMATION:  Physical activity- moves around a lot at work    Dining Out History Reviewed With Patient 10/21/2021   How often do you dine out? A couple of times a week.   Where do you dine out? (select all that apply) sit-down restaurants, fast food chains   What types of food do you order when you dine out? steak burger potatoe pop       Physical Activity Reviewed With Patient 2/7/2022   How often do you exercise? Never   What is the duration of your exercise (in minutes)? -   What types of exercise do you do? -   What keeps you from being more active? Lack of Time       NUTRITION DIAGNOSIS:  Obesity r/t long history of positive energy balance aeb BMI >30 kg/m2.    INTERVENTION:  Intervention Provided/Education Provided on post-op diet guidelines, vitamins/minerals essential post-operatively, GI anatomy of bariatric surgeries, ways to help prepare for post-op diet guidelines pre-operatively, portion/calorie-control, mindful eating and sources of protein.  Patient demonstrates understanding. Provided pt with list of goals RD  contact information.      Questions Reviewed With Patient 10/21/2021   How ready are you to make changes regarding your weight? Number 1 = Not ready at all to make changes up to 10 = very ready. 10   How confident are you that you can change? 1 = Not confident that you will be successful making changes up to 10 = very confident. 10       Expected Engagement: good    GOALS:   1) Review powerpoint sent to email     Meal Replacement Shake Options:   *Protein Shake Criteria: no more than 210 Calories, at least 20 grams of protein, and less than 10 grams of sugar   [facility] smoothie (160 Calories, 20 g protein)   Premier Protein (160 Calories, 30 g protein)  Slim Fast Advanced Nutrition (180 Calories, 20 g protein)  Muscle Milk, lactose-free, 17 oz bottle (210 Calories, 30 g protein)  Integrated Supplements, no artificial sugars (110 Calories, 20 g protein)  Genepro, unflavored protein powder (60 Calories, 30 g protein)  Boost/Ensure Max (160 calories, 30 gm protein)   [employer] Core Power (170 calories, 26 gm protein)  Aldi's Elevation Protein Powder (180 calories, 30 gm protein)     Meal Replacement Bar Options:  [facility] Protein Shake (160 Calories, 15 g protein)  Quest Protein Bars (190 Calories, 20 g protein)  Built Bar (170 Calories, 15-20 g protein)  One Protein Bar (210 calories, 20 g protein)  [place] Signature Protein Bar ([employer]) (190 Calories, 21 g protein)  Pure Protein Bars (180 Calories, 21 g protein)    Low Calorie Frozen Meal:  Healthy Choice Power Bowls  Lean Cuisine  Smart Ones  Larry Fraire    Follow up: 1 month, prn    Time spent with patient: 11 minutes.  TORI BARROS, RD, LD

## 2023-04-02 NOTE — PATIENT INSTRUCTIONS
Hi Amr!    Follow-up with RD in 1 month    Thank you,    Zenia Cardona, HAYES, LD  If you would like to schedule or reschedule an appointment with the RD, please call [phone]    Nutrition Goals  1) Review powerpoint sent to email     Meal Replacement Shake Options:   *Protein Shake Criteria: no more than 210 Calories, at least 20 grams of protein, and less than 10 grams of sugar   [facility] smoothie (160 Calories, 20 g protein)   Premier Protein (160 Calories, 30 g protein)  Slim Fast Advanced Nutrition (180 Calories, 20 g protein)  Muscle Milk, lactose-free, 17 oz bottle (210 Calories, 30 g protein)  Integrated Supplements, no artificial sugars (110 Calories, 20 g protein)  Genepro, unflavored protein powder (60 Calories, 30 g protein)  Boost/Ensure Max (160 calories, 30 gm protein)   Fairlife Core Power (170 calories, 26 gm protein)  Aldi's Elevation Protein Powder (180 calories, 30 gm protein)     Meal Replacement Bar Options:  [facility] Protein Shake (160 Calories, 15 g protein)  Quest Protein Bars (190 Calories, 20 g protein)  Built Bar (170 Calories, 15-20 g protein)  One Protein Bar (210 calories, 20 g protein)  [place] Signature Protein Bar ([employer]) (190 Calories, 21 g protein)  Pure Protein Bars (180 Calories, 21 g protein)    Low Calorie Frozen Meal:  Healthy Choice Power Bowls  Lean Cuisine  Smart Ones  Larry Fraire      Interested in working with a health ? Health coaches work with you to improve your overall health and wellbeing. They look at the whole person, and may involve discussion of different areas of life, including, but not limited to the four pillars of health (sleep, exercise, nutrition, and stress management). Discuss with your care team if you would like to start working a health .    Health Coaching-3 Pack:    $99 for three health coaching visits    Visits may be done in person or via phone    Coaching is a partnership between the  and the client; Coaches do  not prescribe or diagnose    Coaching helps inspire the client to reach his/her personal goals    COMPREHENSIVE WEIGHT MANAGEMENT PROGRAM  VIRTUAL SUPPORT GROUPS    For Support Group Information:      We offer support groups for patients who are working on weight loss and considering, preparing for or have had weight loss surgery.   There is no cost for this opportunity.  You are invited to attend the?Virtual Support Groups?provided by any of the following locations:    [facility] via Microsoft Teams with Amy Mendez RN  2.   [place] via [employer] Teams with Donald Obrien, PhD, LP  3.   [place] via [employer] Teams with Cristin Baltazar RN  4.   [facility] via [employer] Teams with Cristin Belcher, [facility]    The following Support Group information can also be found on our website:  [email or web address]    [email or web address]-[email or web address]-groups    [facility] Weight Loss Surgery Support Group    [facility] Weight Loss Surgery Support Group  The support group is a patient-lead forum that meets monthly to share experiences, encouragement and education. It is open to those who have had weight loss surgery, are scheduled for surgery, or are considering surgery.   WHEN: This group meets on the 3rd Wednesday of each month from 5:00PM - 6:00PM virtually using Microsoft Teams.   FACILITATOR: Led by Amy Benavidez RD, LD, RN, the program's Clinical Coordinator.   TO REGISTER: Please contact the clinic via [employer] or call the nurse line directly at [phone] to inform our staff that you would like an invite sent to you and to let us know the email you would like the invite sent to. Prior to the meeting, a link with directions on how to join the meeting will be sent to you.    2023 Meetings (speakers to be determined)  January 18: \"Let's Talk\" a time for the group to " "share.  February 15: \"Let's Talk\" a time for the group to share.  March 15: \"Let's Talk\" a time for the group to share.  April 19: \"Let's Talk\" a time for the group to share.  May 17: \"Let's Talk\" a time for the group to share.  June 21: \"Let's Talk\" a time for the group to share.    [facility] Support Groups    Connections Bariatric Care Support Group?  This is open to all pre- and post- operative bariatric surgery patients as well as their support system.   WHEN: This group meets the 2nd Tuesday of each month from 6:30 PM - 8:00 PM virtually using Microsoft Teams.   FACILITATOR: Led by Donald Obrien, Ph.D who is a Licensed Psychologist with the [facility] Comprehensive Weight Management Program.   TO REGISTER: Please send an email to Donald Obrien, Ph.D., LP at?[email or web address]?if you would like an invitation to the group and to learn about using Microsoft Teams.    2023 Meetings  January 10: Ronaldo Mckeon, PharmD, Pharmacy Resident, \"Medications and Bariatric Surgery\"  February 14:  March 14:  April 11:  May 9:  June 11:    Connections Post-Operative Bariatric Surgery Support Group  This is a support group for [facility] bariatric patients (and those external to [facility]) who have had bariatric surgery and are at least 3 months post-surgery.  WHEN: This support group meets the 4th Wednesday of the month from 11:00 AM - 12:00 PM virtually using Microsoft Teams.   FACILITATOR: Led by Certified Bariatric Nurse, Cristin Baltazar RN.   TO REGISTER: Please send an email to Cristin at [email or web address] if you would like an invitation to the group and to learn about using Microsoft Teams.    2023 Meetings  January 25  February 22  March 22  April 26  May 24  Mone 28      [facility] Healthy Lifestyle Virtual Support Group    Healthy Lifestyle Virtual Support Group?  This is 60 minutes of small " "group guided discussion, support and resources. All are welcome who want a healthy lifestyle.  WHEN: This group meets monthly on a Friday from 12:30 PM - 1:30 PM virtually using Microsoft Teams.   FACILITATOR: Led by National Board Certified Health and , Cristin Belcher [facility].   TO REGISTER: Please send an email to Cristin at?[email or web address] to receive monthly invites to the group or if you have any questions about having a health .  Prior to the meeting, a link with directions on how to join the meeting will be sent to you.    2023 Meetings  January 20: \"Let's Talk\" a time for the group to share  February 17: Guest Speaker, Sanjuanita Royal RD, Registered Dietician, \"Tips to Maximize Your Metabolism\"  March 17: Let's Talk\" a time for the group to share  April 14: Guest Speaker, Zenia Cardona RD, Registered Dietician, \"Heart Health\"  May 19: \"Let's Talk\" a time for the group to share  June: To be announced.      "

## 2023-04-12 NOTE — TELEPHONE ENCOUNTER
Follow up on this denial. Looking for direction. Would you like to appeal, change med, please let me know. Thanks again

## 2023-04-21 ENCOUNTER — VIRTUAL VISIT (OUTPATIENT)
Dept: ENDOCRINOLOGY | Facility: CLINIC | Age: 33
End: 2023-04-21

## 2023-04-21 DIAGNOSIS — Z71.3 NUTRITIONAL COUNSELING: Primary | ICD-10-CM

## 2023-04-21 DIAGNOSIS — E66.9 OBESITY: ICD-10-CM

## 2023-04-21 PROCEDURE — 99207 PR NO CHARGE LOS: CPT | Mod: 95 | Performed by: DIETITIAN, REGISTERED

## 2023-04-21 PROCEDURE — 97803 MED NUTRITION INDIV SUBSEQ: CPT | Mod: 95 | Performed by: DIETITIAN, REGISTERED

## 2023-04-21 NOTE — PATIENT INSTRUCTIONS
Hi Amr!    Follow-up with RD in 1 month    Thank you,    Zenia Cardona, RD, LD  If you would like to schedule or reschedule an appointment with the RD, please call [phone]    Nutrition Goals  1) Increase physical activity as able  2) Work on reducing snacking and having 3 true meals per day    Interested in working with a health ? Health coaches work with you to improve your overall health and wellbeing. They look at the whole person, and may involve discussion of different areas of life, including, but not limited to the four pillars of health (sleep, exercise, nutrition, and stress management). Discuss with your care team if you would like to start working a health .    Health Coaching-3 Pack:    $99 for three health coaching visits    Visits may be done in person or via phone    Coaching is a partnership between the  and the client; Coaches do not prescribe or diagnose    Coaching helps inspire the client to reach his/her personal goals    COMPREHENSIVE WEIGHT MANAGEMENT PROGRAM  VIRTUAL SUPPORT GROUPS    For Support Group Information:      We offer support groups for patients who are working on weight loss and considering, preparing for or have had weight loss surgery.   There is no cost for this opportunity.  You are invited to attend the?Virtual Support Groups?provided by any of the following locations:    [facility] via [employer] Teams with Amy Mendez RN  2.   [place] via [employer] with Donald Obrien, PhD, LP  3.   [place] via [employer] with Cristin Baltazar, DEMETRIS  4.   [facility] via [employer] Teams with Cristin Belcher, [facility]    The following Support Group information can also be found on our website:  [email or web address]    [email or web address]/weight-loss-[email or web address]-weight-[email or web address]-support-groups    [facility] Weight Loss Surgery Support Group    [facility]  "Weight Loss Surgery Support Group  The support group is a patient-lead forum that meets monthly to share experiences, encouragement and education. It is open to those who have had weight loss surgery, are scheduled for surgery, or are considering surgery.   WHEN: This group meets on the 3rd Wednesday of each month from 5:00PM - 6:00PM virtually using Microsoft Teams.   FACILITATOR: Led by Amy Benavidez, HAYES, LD, RN, the program's Clinical Coordinator.   TO REGISTER: Please contact the clinic via [employer] or call the nurse line directly at [phone] to inform our staff that you would like an invite sent to you and to let us know the email you would like the invite sent to. Prior to the meeting, a link with directions on how to join the meeting will be sent to you.    2023 Meetings (speakers to be determined)  January 18: \"Let's Talk\" a time for the group to share.  February 15: \"Let's Talk\" a time for the group to share.  March 15: \"Let's Talk\" a time for the group to share.  April 19: \"Let's Talk\" a time for the group to share.  May 17: \"Let's Talk\" a time for the group to share.  June 21: \"Let's Talk\" a time for the group to share.    [facility] Support Groups    Connections Bariatric Care Support Group?  This is open to all pre- and post- operative bariatric surgery patients as well as their support system.   WHEN: This group meets the 2nd Tuesday of each month from 6:30 PM - 8:00 PM virtually using Microsoft Teams.   FACILITATOR: Led by Donald Obrien, Ph.D who is a Licensed Psychologist with the [facility] Comprehensive Weight Management Program.   TO REGISTER: Please send an email to Donald Obrien, Ph.D., LP at?[email or web address]?if you would like an invitation to the group and to learn about using Microsoft Teams.    2023 Meetings  January 10: Ronaldo Mckeon, PharmD, Pharmacy Resident, \"Medications and Bariatric Surgery\"  February 14:  March " "14:  April 11:  May 9:  June 11:    Connections Post-Operative Bariatric Surgery Support Group  This is a support group for [facility] bariatric patients (and those external to [facility]) who have had bariatric surgery and are at least 3 months post-surgery.  WHEN: This support group meets the 4th Wednesday of the month from 11:00 AM - 12:00 PM virtually using Microsoft Teams.   FACILITATOR: Led by Certified Bariatric Nurse, Cristin Baltazar RN.   TO REGISTER: Please send an email to Cristin at [email or web address] if you would like an invitation to the group and to learn about using Microsoft Teams.    2023 Meetings  January 25  February 22  March 22  April 26  May 24  Mone 28      [facility] Healthy Lifestyle Virtual Support Group    Healthy Lifestyle Virtual Support Group?  This is 60 minutes of small group guided discussion, support and resources. All are welcome who want a healthy lifestyle.  WHEN: This group meets monthly on a Friday from 12:30 PM - 1:30 PM virtually using Microsoft Teams.   FACILITATOR: Led by National Board Certified Health and , Cristin Belcher [facility].   TO REGISTER: Please send an email to Cristin [email or web address] to receive monthly invites to the group or if you have any questions about having a health .  Prior to the meeting, a link with directions on how to join the meeting will be sent to you.    2023 Meetings January 20: \"Let's Talk\" a time for the group to share  February 17: Guest Speaker, Sanjuanita Royal RD, Registered Dietician, \"Tips to Maximize Your Metabolism\"  March 17: Let's Talk\" a time for the group to share  April 14: Guest Speaker, Zenia Cardona RD, Registered Dietician, \"Heart Health\"  May 19: \"Let's Talk\" a time for the group to share  June: To be announced.      "

## 2023-04-21 NOTE — LETTER
"4/21/2023       RE: Alcides Beauchamp  [address]  [place] [address]     Dear Colleague,    Thank you for referring your patient, Alcides Beauchamp, to the [facility]NA[facility] [place] at [facility]. Please see a copy of my visit note below.    Alcides Beauchamp is a 31 year old male who is being evaluated via a billable telephone visit.     The patient has been notified of following:     \"This telephone visit will be conducted via a call between you and your physician/provider. We have found that certain health care needs can be provided without the need for a physical exam.  This service lets us provide the care you need with a short phone conversation.  If a prescription is necessary we can send it directly to your pharmacy.  If lab work is needed we can place an order for that and you can then stop by our lab to have the test done at a later time.    Telephone visits are billed at different rates depending on your insurance coverage. During this emergency period, for some insurers they may be billed the same as an in-person visit.  Please reach out to your insurance provider with any questions.    If during the course of the call the physician/provider feels a telephone visit is not appropriate, you will not be charged for this service.\"    Patient has given verbal consent for Telephone visit?  Yes    How would you like to obtain your AVS? Linda    Phone call duration: 11 minutes    During this virtual visit the patient is located in [place], patient verifies this as the location during the entirety of this visit.        Return Bariatric Nutrition Consultation Note    Reason For Visit: Nutrition Assessment    Alcides Beauchamp is a 31 year old presenting today for a return bariatric nutrition consult.   Pt is interested in laparoscopic sleeve gastrectomy or RNY with Dr. Sanchez expected surgery in TBD.  Patient is accompanied by self.  Pt has met required " "nutrition visits prior to surgery.     Pt referred by ANITHA Johnson on February 15, 2022.  Patient with Co-morbidities of obesity including:  Type II DM no  Renal Failure no  Sleep apnea yes  Hypertension no   Dyslipidemia no  Joint pain no  Back pain no  GERD no         10/21/2021     2:43 PM   Support System Reviewed With Patient   Who do you have in your support network that can be available to help you for the first 2 weeks after surgery? wife and family   Who can you count on for support throughout your weight loss surgery journey? wife and family       ANTHROPOMETRICS:  Estimated body mass index is 47.3 kg/m  as calculated from the following:    Height as of 3/29/23: 1.702 m (5' 7\").    Weight as of 3/29/23: 137 kg (302 lb).     Current weight (4/21/23): 303 lbs per pt (has not weighed recently)    Required weight loss goal pre-op: -20 lbs from initial consult weight (goal weight 287 lbs or less before surgery)        10/21/2021     2:43 PM   --   I have tried the following methods to lose weight Weight Loss Surgery           10/21/2021     2:43 PM   Weight Loss Questions Reviewed With Patient   How long have you been overweight? Since puberty       SUPPLEMENT INFORMATION:  Vitamin D    Saxenda    NUTRITION HISTORY:  Hx of lap band (2009). Has gained ~100 lbs in the last year. Pt has been struggling with consuming large amounts of \"junk\" food. Wife is not cooking d/t being pregnant with twins. Struggles with large portion sizes.     Pt waiting for Dr. Carnes to determine if gastric sleeve or RNY is the best option.    3/9/22: Pt states he has noticed Saxenda working. He has less cravings and a lower appetite. Unsure of best time to be taking it-has been taking right before bed. Dr. Sanchez would like to do an endoscopy before deciding which surgery to pursue. Overall has improved diet- focused on more protein and vegetables and less processed foods.    4/1/22: Pt reports he has stopped taking " "Saxenda d/t sleepiness and body aches. Trying to decrease carbohydrates in his meals. Still only eating one meal per day in the evenings and snacking occasionally. States he does not think he will be able to change his diet habits and would like to do a modified liquid diet prior to surgery to reach his goal weight.    5/2/22: Recently saw Dr. Carnes and is going to move forward with surgery. Required to lose 20 lbs prior to surgery. Currently taking Saxenda and trying to have smaller portions. Pt wants to utilize the modified liquid diet once surgery date is closer.     6/2/22: Pt has noticed that medication has been working. Has reduced portion sizes. Wants to try modified liquid diet.    10/7/22: Pt has not been doing well with weight loss goals recently. Has gotten \"off track\" and has not been focusing on healthy meals. Was not able to start modified liquid diet but plans to this month.    11/14/22: Pt has lost weight since last visit. Started on Saxenda-reports he is not feeling well/nauseous. Following a very low carb diet.     12/20/22: Pt has not lost weight since last visit- has been struggling with the holidays. Wanting to start modified liquid diet next week.     3/31/23: Pt increased topiramate and phentermine. Planning to do the modified liquid diet. Already completed it once and did not lose very much weight.     4/21/23: Pt has been struggling this month- states he never feels full. Has been snacking more and mostly grazing throughout the day instead of having meals. Craving sugar.         10/21/2021     2:43 PM   Recall Diet Questions Reviewed With Patient   Describe what you typically consume for breakfast (typical or most recent) no breakfast   Describe what you typically consume for lunch (typical or most recent) fries burger ,pop   Describe what you typically consume for supper (typical or most recent) mozzrilla stick pop   Describe what you typically consume as snacks (typical or most " recent) anything chips   How many ounces of water, or other low calorie drinks, do you drink daily (8 oz=1 glass)? 64 oz or more   How many ounces of caffeine (coffee, tea, pop) do you drink daily (8 oz=1 glass)? 32 oz   How many ounces of carbonated (pop, beer, sparkling water) drinks do you drinky daily (8 oz=1 glass)? 8 oz   How many ounces of milk do you drink daily (8 oz=1 glass) 8 oz   Please indicate the type of milk 1%   How often do you drink alcohol? Monthly or less   If you do drink alcohol, how many drinks might you have in a day? (one drink = 5 oz. wine, 1 can/bottle of beer, 1 shot liquor) 1 or 2           10/21/2021     2:43 PM   Eating Habits   Do you have any dietary restrictions? No   Do you currently binge eat (eat a large amount of food in a short time)? No   Are you an emotional eater? No   Do you get up to eat after falling asleep? No   What foods do you crave? anything chocolate,chips     Progress on Previous Goals- not met  1) Review powerpoint sent to email     ADDITIONAL INFORMATION:  Physical activity- moves around a lot at work        10/21/2021     2:43 PM   Dining Out History Reviewed With Patient   How often do you dine out? A couple of times a week.   Where do you dine out? (select all that apply) sit-down restaurants    fast food chains   What types of food do you order when you dine out? shira allen potatoe pop           2/7/2022     1:48 PM   Physical Activity Reviewed With Patient   How often do you exercise? Never   What keeps you from being more active? Lack of Time       NUTRITION DIAGNOSIS:  Obesity r/t long history of positive energy balance aeb BMI >30 kg/m2.    INTERVENTION:  Intervention Provided/Education Provided on post-op diet guidelines, vitamins/minerals essential post-operatively, GI anatomy of bariatric surgeries, ways to help prepare for post-op diet guidelines pre-operatively, portion/calorie-control, mindful eating and sources of protein.  Patient demonstrates  understanding. Provided pt with list of goals RD contact information.          10/21/2021     2:43 PM   Questions Reviewed With Patient   How ready are you to make changes regarding your weight? Number 1 = Not ready at all to make changes up to 10 = very ready. 10   How confident are you that you can change? 1 = Not confident that you will be successful making changes up to 10 = very confident. 10       Expected Engagement: good    GOALS:   1) Increase physical activity as able  2) Work on reducing snacking and having 3 true meals per day    Follow up: 1 month, prn    Time spent with patient: 11 minutes.  TORI BARROS RD, LD

## 2023-06-13 ENCOUNTER — VIRTUAL VISIT (OUTPATIENT)
Dept: ENDOCRINOLOGY | Facility: CLINIC | Age: 33
End: 2023-06-13
Payer: COMMERCIAL

## 2023-06-13 VITALS — HEIGHT: 67 IN | WEIGHT: 303 LBS | BODY MASS INDEX: 47.56 KG/M2

## 2023-06-13 DIAGNOSIS — E66.01 CLASS 3 SEVERE OBESITY DUE TO EXCESS CALORIES WITH SERIOUS COMORBIDITY AND BODY MASS INDEX (BMI) OF 45.0 TO 49.9 IN ADULT (H): ICD-10-CM

## 2023-06-13 DIAGNOSIS — Z71.3 NUTRITIONAL COUNSELING: Primary | ICD-10-CM

## 2023-06-13 DIAGNOSIS — E66.813 CLASS 3 SEVERE OBESITY DUE TO EXCESS CALORIES WITH SERIOUS COMORBIDITY AND BODY MASS INDEX (BMI) OF 45.0 TO 49.9 IN ADULT (H): ICD-10-CM

## 2023-06-13 PROCEDURE — 97803 MED NUTRITION INDIV SUBSEQ: CPT | Mod: 93 | Performed by: DIETITIAN, REGISTERED

## 2023-06-13 PROCEDURE — 99207 PR NO CHARGE LOS: CPT | Mod: 93 | Performed by: DIETITIAN, REGISTERED

## 2023-06-13 NOTE — PATIENT INSTRUCTIONS
Glen Mcgrath,    Follow-up with RD on 7/14    Thank you,    Lisa Rg, RD, LD  If you would like to schedule or reschedule an appointment with the RD, please call [phone]    Nutrition Goals  1) Consume 60 gm protein daily, ideally between 2-3 smaller meals  2) Continue time restricted eating (intermittent fasting)      Protein Supplements for After Surgery:   Unflavored protein powder: Genepro, Isopure (25-30 gm protein per 1 scoop)  You may also use flavored protein powder if you prefer.   Protein shots: https://store.Pergunter.Espressi/collections/protein-shots  Pre-made protein shakes (no more than 210 Calories, at least 20 grams of protein, and less than 10 grams of sugar):   Premier Protein (160 Calories, 30 g protein)  Boost/Ensure Max (160 calories, 30 gm protein)   Fairlife Core Power (170 calories, 26 gm protein)  Aldi's Elevation Protein Shake (160 calories, 30 gm protein)   Equate Protein Shake (160 calories, 30 gm protein)  Slim Fast Advanced Nutrition (180 Calories, 20 g protein)  Muscle Milk, lactose-free, 17 oz bottle (210 Calories, 30 g protein)  Glucerna Protein Smart (150 vera, 30 gm protein)  Clear Protein Drinks:  BiPro  Premier Protein clear  [id number]  M Health Protein 15 Concentrate  Bone broth  Beneprotein protein powder mixed with 4-6 oz of fluid  [id number]      Post-op Diet Handouts:  Diet Guidelines after Weight-loss Surgery  [email or web address]     Your Stage 1 Diet: Clear Liquids  [email or web address]     Your Stage 2 Diet: Low-fat Full Liquids  [email or web address]     Your Stage 3 Diet: Pureed Foods  [email or web address]     Pureed Pleasures  [email or web address]    Your Stage 4 Diet: Soft Foods  [email or web address]    Your Stage 5 Diet: Regular Foods  [email or web address]    Supplements after Sleeve Gastrectomy, Gastric Bypass or Single Anastomosis Duodenal Switch  [email or web address]    Keeping Track of  "Fluids  [email or web address]        COMPREHENSIVE WEIGHT MANAGEMENT PROGRAM  VIRTUAL SUPPORT GROUPS    For Support Group Information:      We offer support groups for patients who are working on weight loss and considering, preparing for or have had weight loss surgery.   There is no cost for this opportunity.  You are invited to attend the?Virtual Support Groups?provided by any of the following locations:    [facility] via Microsoft Teams with Amy Mendez RN  2.   [place] via [employer] with Donald Obrien, PhD, LP  3.   [place] via [employer] with Cristin Baltazar RN  4.   [facility] via [employer] Teams with Cristin Belcher, [facility]    The following Support Group information can also be found on our website:  [email or web address]://www.[employer].org/treatments/weight-loss-surgery-support-groups    [email or web address]://www.[employer].org/treatments/weight-loss-and-weight-loss-surgery-support-groups    [facility] Weight Loss Surgery Support Group    [facility] Weight Loss Surgery Support Group  The support group is a patient-lead forum that meets monthly to share experiences, encouragement and education. It is open to those who have had weight loss surgery, are scheduled for surgery, or are considering surgery.   WHEN: This group meets on the 3rd Wednesday of each month from 5:00PM - 6:00PM virtually using Microsoft Teams.   FACILITATOR: Led by Amy Benavidez RD, LD, RN, the program's Clinical Coordinator.   TO REGISTER: Please contact the clinic via [employer] or call the nurse line directly at [phone] to inform our staff that you would like an invite sent to you and to let us know the email you would like the invite sent to. Prior to the meeting, a link with directions on how to join the meeting will be sent to you.    2023 Meetings   April 19: Guest Speaker, Miguel Chambers RD, WANDA, \"Maintaining Weight Loss after Bariatric Surgery\".  May 17: \"Let's Talk\" a time for the " "group to share.  June 21: \"Let's Talk\" a time for the group to share.  July 19  August 16  September 20  October 18  November 15  December 20    [facility] Support Groups    Connections Bariatric Care Support Group?  This is open to all pre- and post- operative bariatric surgery patients as well as their support system.   WHEN: This group meets the 2nd Tuesday of each month from 6:30 PM - 8:00 PM virtually using Microsoft Teams.   FACILITATOR: Led by Donald Obrien, Ph.D who is a Licensed Psychologist with the [facility] Comprehensive Weight Management Program.   TO REGISTER: Please send an email to Donald Obrien, Ph.D., LP at?[email or web address]?if you would like an invitation to the group and to learn about using Microsoft Teams.    2023 Meetings  April 11: Guest speaker, Christine Contreras MD, Bariatrician, [facility] Comprehensive Weight Management Program, \"Injectable Weight Loss Medications\".  May 9  Mone 13  July 11  August 8  September 12  October 10  November 14  December 12    Connections Post-Operative Bariatric Surgery Support Group  This is a support group for [facility] bariatric patients (and those external to [facility]) who have had bariatric surgery and are at least 3 months post-surgery.  WHEN: This support group meets the 4th Wednesday of the month from 11:00 AM - 12:00 PM virtually using Microsoft Teams.   FACILITATOR: Led by Certified Bariatric Nurse, Cristin Baltazar RN.   TO REGISTER: Please send an email to Cristin at [email or web address] if you would like an invitation to the group and to learn about using Microsoft Teams.    2023 Meetings  April 26  May 24  Mone 28  July 26  August 23  September 27  October 25  November 22  December 27    [facility]   Healthy Lifestyle Virtual Support Group    Healthy Lifestyle Virtual Support Group?  This is 60 minutes of small group guided " discussion, support and resources. All are welcome who want a healthy lifestyle.  WHEN: This group meets monthly on a Friday from 12:30 PM - 1:30 PM virtually using Microsoft Teams.  This group will meet the first Friday of the month beginning In July  FACILITATOR: Led by National Board Certified Health and , Cristin Belcher [facility].   TO REGISTER: Please send an email to Cristin at?[email or web address] to receive monthly invites to the group or if you have any questions about having a health .  Prior to the meeting, a link with directions on how to join the meeting will be sent to you.    2023 Meetings  May 19: Let's Talk  June 9: Create Your Coaching Toolkit: Learn How to  Yourself  July 7: Let's Talk  August 4: Benefits of Fiber with HAYES Hutson  September 1: Show and Tell (share your aps, podcasts, recipes, hacks, books)  October 6 :Let's Talk  November 3: Introduction to Mindfulness   December 1: Let's Talk

## 2023-06-13 NOTE — NURSING NOTE
Is the patient currently in the state of [place]? YES    Visit mode:TELEPHONE    If the visit is dropped, the patient can be reconnected by: TELEPHONE VISIT: Phone number: [phone]    Will anyone else be joining the visit? NO      How would you like to obtain your AVS?    Are changes needed to the allergy or medication list? NO    Reason for visit: Follow Up

## 2023-06-13 NOTE — LETTER
"6/13/2023       RE: Alcides Beauchamp  [address]  [place] [address]     Dear Colleague,    Thank you for referring your patient, Alcides Beauchamp, to the [facility]NA[facility] [place] at [facility]. Please see a copy of my visit note below.    Alcides Beauchamp is a 31 year old male who is being evaluated via a billable telephone visit.     The patient has been notified of following:     \"This telephone visit will be conducted via a call between you and your physician/provider. We have found that certain health care needs can be provided without the need for a physical exam.  This service lets us provide the care you need with a short phone conversation.  If a prescription is necessary we can send it directly to your pharmacy.  If lab work is needed we can place an order for that and you can then stop by our lab to have the test done at a later time.    Telephone visits are billed at different rates depending on your insurance coverage. During this emergency period, for some insurers they may be billed the same as an in-person visit.  Please reach out to your insurance provider with any questions.    If during the course of the call the physician/provider feels a telephone visit is not appropriate, you will not be charged for this service.\"    Patient has given verbal consent for Telephone visit?  Yes    How would you like to obtain your AVS? Linda    Phone call duration: 17 minutes    During this virtual visit the patient is located in [place], patient verifies this as the location during the entirety of this visit.        Return Bariatric Nutrition Consultation Note    Reason For Visit: Nutrition Assessment    Alcides Beauchamp is a 31 year old presenting today for a return bariatric nutrition consult.   Pt is interested in laparoscopic sleeve gastrectomy vs RNYGB with Dr. Sanchez expected surgery in TBD.  Patient is accompanied by self.  Pt has met required " "nutrition visits prior to surgery.     Pt referred by ANITHA Johnson on February 15, 2022.  Patient with Co-morbidities of obesity including:  Type II DM no  Renal Failure no  Sleep apnea yes  Hypertension no   Dyslipidemia no  Joint pain no  Back pain no  GERD no         10/21/2021     2:43 PM   Support System Reviewed With Patient   Who do you have in your support network that can be available to help you for the first 2 weeks after surgery? wife and family   Who can you count on for support throughout your weight loss surgery journey? wife and family       ANTHROPOMETRICS:  Initial consult weight (4/27/2022): 307 lbs   Estimated body mass index is 47.46 kg/m  as calculated from the following:    Height as of this encounter: 1.702 m (5' 7\").    Weight as of this encounter: 137.4 kg (303 lb).  (-0 lbs over last 2 months, -4 bs from initial)     Required weight loss goal pre-op: -20 lbs from initial consult weight (goal weight 287 lbs or less before surgery)    Can schedule surgery once down to 290 lbs.         10/21/2021     2:43 PM   --   I have tried the following methods to lose weight Weight Loss Surgery           10/21/2021     2:43 PM   Weight Loss Questions Reviewed With Patient   How long have you been overweight? Since puberty       SUPPLEMENT INFORMATION:  None     Medications for Weight Loss:  Phentermine, topiramate   Insurance did not cover Wegovy    NUTRITION HISTORY:  Hx of lap band (2009). S/p band removal. Gained 100 lbs within 6 months of removal. Weight has fluctuated +/- 20 lbs over past 1-2 years. Planning for sleeve gastrectomy with Dr. Sanchez, however noted at his last visit 12/14/23 - \"However if we find anatomically this is challenging we would perform a Homero-en-Y gastric bypass.\"    Today - pt reports he has been following IF, which has been helping as he has lost 10 lbs in the past 2 weeks. He is eating 1-2 meals daily. Working on cutting out sugar from intake and increase lean " protein and veggies at meals.     Diet Recall:  Gatorade zero or Prime sugar-free beverage (provides vitamin A, E, B6, B12, potassium and magnesium)   4-6 pm Homemade taco salad with some chips  7-9 pm tuna salad and 3-4 dates   Beverages: Ice carbonated water, Prime (sugar-free sports drink)             10/21/2021     2:43 PM   Recall Diet Questions Reviewed With Patient   Describe what you typically consume for breakfast (typical or most recent) no breakfast   Describe what you typically consume for lunch (typical or most recent) fries burger ,pop   Describe what you typically consume for supper (typical or most recent) mozzrilla stick pop   Describe what you typically consume as snacks (typical or most recent) anything chips   How many ounces of water, or other low calorie drinks, do you drink daily (8 oz=1 glass)? 64 oz or more   How many ounces of caffeine (coffee, tea, pop) do you drink daily (8 oz=1 glass)? 32 oz   How many ounces of carbonated (pop, beer, sparkling water) drinks do you drinky daily (8 oz=1 glass)? 8 oz   How many ounces of milk do you drink daily (8 oz=1 glass) 8 oz   Please indicate the type of milk 1%   How often do you drink alcohol? Monthly or less   If you do drink alcohol, how many drinks might you have in a day? (one drink = 5 oz. wine, 1 can/bottle of beer, 1 shot liquor) 1 or 2           10/21/2021     2:43 PM   Eating Habits   Do you have any dietary restrictions? No   Do you currently binge eat (eat a large amount of food in a short time)? No   Are you an emotional eater? No   Do you get up to eat after falling asleep? No   What foods do you crave? anything chocolate,chips        Progress on Previous Goals  1) Increase physical activity as able  2) Work on reducing snacking and having 3 true meals per day - Improving    ADDITIONAL INFORMATION:  Physical activity- moves around a lot at work        10/21/2021     2:43 PM   Dining Out History Reviewed With Patient   How often do you  dine out? A couple of times a week.   Where do you dine out? (select all that apply) sit-down restaurants    fast food chains   What types of food do you order when you dine out? shira olivares pop           2/7/2022     1:48 PM   Physical Activity Reviewed With Patient   How often do you exercise? Never   What keeps you from being more active? Lack of Time       NUTRITION DIAGNOSIS:  Obesity r/t long history of positive energy balance aeb BMI >30 kg/m2.    INTERVENTION:  Intervention Provided/Education Provided on post-op diet guidelines, vitamins/minerals essential post-operatively, GI anatomy of bariatric surgeries, ways to help prepare for post-op diet guidelines pre-operatively, portion/calorie-control, mindful eating and sources of protein.  Patient demonstrates understanding. Provided pt with list of goals RD contact information.          10/21/2021     2:43 PM   Questions Reviewed With Patient   How ready are you to make changes regarding your weight? Number 1 = Not ready at all to make changes up to 10 = very ready. 10   How confident are you that you can change? 1 = Not confident that you will be successful making changes up to 10 = very confident. 10       Expected Engagement: good    GOALS:   1) Consume 60 gm protein daily, ideally between 2-3 smaller meals  2) Continue time restricted eating (intermittent fasting)      Protein Supplements for After Surgery:   Unflavored protein powder: Genepro, Isopure (25-30 gm protein per 1 scoop)  You may also use flavored protein powder if you prefer.   Protein shots: https://store.bariatricpal.com/collections/protein-shots  Pre-made protein shakes (no more than 210 Calories, at least 20 grams of protein, and less than 10 grams of sugar):   Premier Protein (160 Calories, 30 g protein)  Boost/Ensure Max (160 calories, 30 gm protein)   [employer]ac[employer]ch Core Power (170 calories, 26 gm protein)  Aldi's Elevation Protein Shake (160 calories, 30 gm protein)   Equate Protein  Shake (160 calories, 30 gm protein)  Slim Fast Advanced Nutrition (180 Calories, 20 g protein)  Muscle Milk, lactose-free, 17 oz bottle (210 Calories, 30 g protein)  Glucerna Protein Smart (150 vera, 30 gm protein)  Clear Protein Drinks:  BiPro  Premier Protein clear  [id number]  M Health Protein 15 Concentrate  Bone broth  Beneprotein protein powder mixed with 4-6 oz of fluid  [id number]      Post-op Diet Handouts:  Diet Guidelines after Weight-loss Surgery  [email or web address]     Your Stage 1 Diet: Clear Liquids  [email or web address]     Your Stage 2 Diet: Low-fat Full Liquids  [email or web address]     Your Stage 3 Diet: Pureed Foods  [email or web address]     Pureed Pleasures  [email or web address]    Your Stage 4 Diet: Soft Foods  [email or web address]    Your Stage 5 Diet: Regular Foods  [email or web address]    Supplements after Sleeve Gastrectomy, Gastric Bypass or Single Anastomosis Duodenal Switch  [email or web address]    Keeping Track of Fluids  [email or web address]        Follow up: 7/14/23    Time spent with patient: 17 minutes.  Lisa Rg, HAYES, LD

## 2023-06-13 NOTE — PROGRESS NOTES
"Alcides Beauchamp is a 31 year old male who is being evaluated via a billable telephone visit.     The patient has been notified of following:     \"This telephone visit will be conducted via a call between you and your physician/provider. We have found that certain health care needs can be provided without the need for a physical exam.  This service lets us provide the care you need with a short phone conversation.  If a prescription is necessary we can send it directly to your pharmacy.  If lab work is needed we can place an order for that and you can then stop by our lab to have the test done at a later time.    Telephone visits are billed at different rates depending on your insurance coverage. During this emergency period, for some insurers they may be billed the same as an in-person visit.  Please reach out to your insurance provider with any questions.    If during the course of the call the physician/provider feels a telephone visit is not appropriate, you will not be charged for this service.\"    Patient has given verbal consent for Telephone visit?  Yes    How would you like to obtain your AVS? Mychart    Phone call duration: 17 minutes    During this virtual visit the patient is located in [place], patient verifies this as the location during the entirety of this visit.        Return Bariatric Nutrition Consultation Note    Reason For Visit: Nutrition Assessment    Alcides Beauchamp is a 31 year old presenting today for a return bariatric nutrition consult.   Pt is interested in laparoscopic sleeve gastrectomy vs RNYGB with Dr. Sanchez expected surgery in [place].  Patient is accompanied by self.  Pt has met required nutrition visits prior to surgery.     Pt referred by ANITHA Johnson on February 15, 2022.  Patient with Co-morbidities of obesity including:  Type II DM no  Renal Failure no  Sleep apnea yes  Hypertension no   Dyslipidemia no  Joint pain no  Back pain no  GERD no         10/21/2021     2:43 PM   Support " "System Reviewed With Patient   Who do you have in your support network that can be available to help you for the first 2 weeks after surgery? wife and family   Who can you count on for support throughout your weight loss surgery journey? wife and family       ANTHROPOMETRICS:  Initial consult weight (4/27/2022): 307 lbs   Estimated body mass index is 47.46 kg/m  as calculated from the following:    Height as of this encounter: 1.702 m (5' 7\").    Weight as of this encounter: 137.4 kg (303 lb).  (-0 lbs over last 2 months, -4 bs from initial)     Required weight loss goal pre-op: -20 lbs from initial consult weight (goal weight 287 lbs or less before surgery)    Can schedule surgery once down to 290 lbs.         10/21/2021     2:43 PM   --   I have tried the following methods to lose weight Weight Loss Surgery           10/21/2021     2:43 PM   Weight Loss Questions Reviewed With Patient   How long have you been overweight? Since puberty       SUPPLEMENT INFORMATION:  None     Medications for Weight Loss:  Phentermine, topiramate   Insurance did not cover Wegovy    NUTRITION HISTORY:  Hx of lap band (2009). S/p band removal. Gained 100 lbs within 6 months of removal. Weight has fluctuated +/- 20 lbs over past 1-2 years. Planning for sleeve gastrectomy with Dr. Sanchez, however noted at his last visit 12/14/23 - \"However if we find anatomically this is challenging we would perform a Homero-en-Y gastric bypass.\"    Today - pt reports he has been following IF, which has been helping as he has lost 10 lbs in the past 2 weeks. He is eating 1-2 meals daily. Working on cutting out sugar from intake and increase lean protein and veggies at meals.     Diet Recall:  Gatorade zero or Prime sugar-free beverage (provides vitamin A, E, B6, B12, potassium and magnesium)   4-6 pm Homemade taco salad with some chips  7-9 pm tuna salad and 3-4 dates   Beverages: Ice carbonated water, Prime (sugar-free sports drink)             " 10/21/2021     2:43 PM   Recall Diet Questions Reviewed With Patient   Describe what you typically consume for breakfast (typical or most recent) no breakfast   Describe what you typically consume for lunch (typical or most recent) fries burger ,pop   Describe what you typically consume for supper (typical or most recent) carrillozrilla stick pop   Describe what you typically consume as snacks (typical or most recent) anything chips   How many ounces of water, or other low calorie drinks, do you drink daily (8 oz=1 glass)? 64 oz or more   How many ounces of caffeine (coffee, tea, pop) do you drink daily (8 oz=1 glass)? 32 oz   How many ounces of carbonated (pop, beer, sparkling water) drinks do you drinky daily (8 oz=1 glass)? 8 oz   How many ounces of milk do you drink daily (8 oz=1 glass) 8 oz   Please indicate the type of milk 1%   How often do you drink alcohol? Monthly or less   If you do drink alcohol, how many drinks might you have in a day? (one drink = 5 oz. wine, 1 can/bottle of beer, 1 shot liquor) 1 or 2           10/21/2021     2:43 PM   Eating Habits   Do you have any dietary restrictions? No   Do you currently binge eat (eat a large amount of food in a short time)? No   Are you an emotional eater? No   Do you get up to eat after falling asleep? No   What foods do you crave? anything chocolate,chips        Progress on Previous Goals  1) Increase physical activity as able  2) Work on reducing snacking and having 3 true meals per day - Improving    ADDITIONAL INFORMATION:  Physical activity- moves around a lot at work        10/21/2021     2:43 PM   Dining Out History Reviewed With Patient   How often do you dine out? A couple of times a week.   Where do you dine out? (select all that apply) sit-down restaurants    fast food chains   What types of food do you order when you dine out? shira stauffere pop           2/7/2022     1:48 PM   Physical Activity Reviewed With Patient   How often do you exercise?  Never   What keeps you from being more active? Lack of Time       NUTRITION DIAGNOSIS:  Obesity r/t long history of positive energy balance aeb BMI >30 kg/m2.    INTERVENTION:  Intervention Provided/Education Provided on post-op diet guidelines, vitamins/minerals essential post-operatively, GI anatomy of bariatric surgeries, ways to help prepare for post-op diet guidelines pre-operatively, portion/calorie-control, mindful eating and sources of protein.  Patient demonstrates understanding. Provided pt with list of goals RD contact information.          10/21/2021     2:43 PM   Questions Reviewed With Patient   How ready are you to make changes regarding your weight? Number 1 = Not ready at all to make changes up to 10 = very ready. 10   How confident are you that you can change? 1 = Not confident that you will be successful making changes up to 10 = very confident. 10       Expected Engagement: good    GOALS:   1) Consume 60 gm protein daily, ideally between 2-3 smaller meals  2) Continue time restricted eating (intermittent fasting)      Protein Supplements for After Surgery:     Unflavored protein powder: Genepro, Isopure (25-30 gm protein per 1 scoop)    You may also use flavored protein powder if you prefer.     Protein shots: [email or web address]    Pre-made protein shakes (no more than 210 Calories, at least 20 grams of protein, and less than 10 grams of sugar):     Premier Protein (160 Calories, 30 g protein)    Boost/Ensure Max (160 calories, 30 gm protein)     [employer] Core Power (170 calories, 26 gm protein)    Aldi's Elevation Protein Shake (160 calories, 30 gm protein)     Equate Protein Shake (160 calories, 30 gm protein)    Slim Fast Advanced Nutrition (180 Calories, 20 g protein)    Muscle Milk, lactose-free, 17 oz bottle (210 Calories, 30 g protein)    Glucerna Protein Smart (150 vera, 30 gm protein)    Clear Protein Drinks:    BiPro    Premier Protein  clear    [id number]    M Health Protein 15 Concentrate    Bone broth    Beneprotein protein powder mixed with 4-6 oz of fluid    [id number]      Post-op Diet Handouts:  Diet Guidelines after Weight-loss Surgery  [email or web address]     Your Stage 1 Diet: Clear Liquids  [email or web address]     Your Stage 2 Diet: Low-fat Full Liquids  [email or web address]     Your Stage 3 Diet: Pureed Foods  [email or web address]     Pureed Pleasures  [email or web address]    Your Stage 4 Diet: Soft Foods  [email or web address]    Your Stage 5 Diet: Regular Foods  [email or web address]    Supplements after Sleeve Gastrectomy, Gastric Bypass or Single Anastomosis Duodenal Switch  [email or web address]    Keeping Track of Fluids  [email or web address]        Follow up: 7/14/23    Time spent with patient: 17 minutes.  Lisa Rg, HAYES, LD

## 2023-06-29 ENCOUNTER — TELEPHONE (OUTPATIENT)
Dept: SURGERY | Facility: CLINIC | Age: 33
End: 2023-06-29

## 2023-06-29 ENCOUNTER — VIRTUAL VISIT (OUTPATIENT)
Dept: ENDOCRINOLOGY | Facility: CLINIC | Age: 33
End: 2023-06-29
Payer: COMMERCIAL

## 2023-06-29 VITALS — BODY MASS INDEX: 46.62 KG/M2 | WEIGHT: 297 LBS | HEIGHT: 67 IN

## 2023-06-29 DIAGNOSIS — E66.01 CLASS 3 SEVERE OBESITY DUE TO EXCESS CALORIES WITH SERIOUS COMORBIDITY AND BODY MASS INDEX (BMI) OF 45.0 TO 49.9 IN ADULT (H): ICD-10-CM

## 2023-06-29 DIAGNOSIS — E66.813 CLASS 3 SEVERE OBESITY DUE TO EXCESS CALORIES WITH SERIOUS COMORBIDITY AND BODY MASS INDEX (BMI) OF 45.0 TO 49.9 IN ADULT (H): ICD-10-CM

## 2023-06-29 PROCEDURE — 99213 OFFICE O/P EST LOW 20 MIN: CPT | Mod: 93 | Performed by: PHYSICIAN ASSISTANT

## 2023-06-29 RX ORDER — PHENTERMINE HYDROCHLORIDE 15 MG/1
30 CAPSULE ORAL EVERY MORNING
Qty: 60 CAPSULE | Refills: 5 | Status: SHIPPED | OUTPATIENT
Start: 2023-06-29

## 2023-06-29 RX ORDER — TOPIRAMATE 25 MG/1
50 TABLET, FILM COATED ORAL 2 TIMES DAILY
Qty: 120 TABLET | Refills: 5 | Status: SHIPPED | OUTPATIENT
Start: 2023-06-29

## 2023-06-29 NOTE — Clinical Note
Pt 7 lbs from goal. Losing wt now that he got ozempic from his family in [place].  He would like to meet with Dr Sanchez July 19th to finalize surgery plan in person and get final weight check.  He said plan is for sleeve possible RYGB if not able to do sleeve.   VEENA for the team.  Thanks Buffy

## 2023-06-29 NOTE — NURSING NOTE
Is the patient currently in the state of [place]? YES    Visit mode:TELEPHONE    If the visit is dropped, the patient can be reconnected by: TELEPHONE VISIT: Phone number: [phone]    Will anyone else be joining the visit? NO      How would you like to obtain your AVS? MyChart    Are changes needed to the allergy or medication list? NO    Reason for visit: RECHECK      Pt stated started 1mg Ozempic.     Pt kept repeating same thing over and over. Asking if wellbutrin was anxiety or depression med, stating he is just taking med for anxiety, and then stating he is taking 1mg Ozempic. Pt would then ask what time the appt is, and then would start over again. Pt unable to provide height or weight, height taken from previous visit.     Pt declined completing qnr.      Nancy Gomez, VF

## 2023-06-29 NOTE — LETTER
2023       RE: Alcides Beauchamp  [address]  [facility] [address]     Dear Colleague,    Thank you for referring your patient, Alcides Beauchamp, to the [facility] [place] [facility]. Please see a copy of my visit note below.      Return Medical Weight Management Note     Alcides Beauchamp  MRN:  [id number]  :  1990  REY:  2023    Dear Sanjay Farrell MD,    I had the pleasure of seeing your patient Alcides Beauchamp. He is a 33 year old male who I am continuing to see for treatment of obesity related to:        10/21/2021     2:43 PM   --   I have the following health issues associated with obesity Heart Disease    High Blood Pressure       Assessment & Plan   Problem List Items Addressed This Visit          Endocrine Diagnoses    Class 3 severe obesity due to excess calories with serious comorbidity and body mass index (BMI) of 45.0 to 49.9 in adult (H)    Relevant Medications    phentermine (ADIPEX-P) 15 MG capsule    topiramate (TOPAMAX) 25 MG tablet        Hx of lap band removal. Planning for sleeve or possible gastric bypass if needed with Dr Sanchez  Working on preop weight loss.  Today 297 and needs 290 lbs to schedule surgery  See Dr Sanchez  to discuss surgery plan. Hx of lap band removal. Plan for sleeve possible gastric bypass.    Plan to stop ozempic and phentermine 1 week prior to surgery. Can     INTERVAL HISTORY:    Anti-obesity medications:     Current:   [place]  phentermine to 30mg daily  Increase topiramate to 50mg twice daily    CURRENT WEIGHT:   297 lbs 0 oz    Initial Weight (lbs): 320 lbs     Cumulative weight loss (lbs): 23  Weight Loss Percentage: 7.19%        3/29/2023     8:17 AM   Changes and Difficulties   I have made the following changes to my diet since my last visit: None   With regards to my diet, I am still struggling with:  None   I have made the following changes to my activity/exercise since my last visit: None   With regards to my activity/exercise, I am still struggling with: Lack of time         MEDICATIONS:   Current Outpatient Medications   Medication Sig Dispense Refill    phentermine (ADIPEX-P) 15 MG capsule Take 2 capsules (30 mg) by mouth every morning 60 capsule 5    topiramate (TOPAMAX) 25 MG tablet Take 2 tablets (50 mg) by mouth 2 times daily 120 tablet 5    buPROPion (WELLBUTRIN XL) 150 MG 24 hr tablet Take 1 tablet (150 mg) by mouth every morning For 4 days then increase to 2 tablets (300 mg) thereafter. Replaces duloxetine. (Patient not taking: Reported on 6/13/2023) 60 tablet 3           3/29/2023     8:17 AM   Weight Loss Medication History Reviewed With Patient   Which weight loss medications are you currently taking on a regular basis? Saxenda (liraglutide)    Topamax (topiramate)   Are you having any side effects from the weight loss medication that we have prescribed you? No       Lab on 11/25/2022   Component Date Value Ref Range Status    25 OH Vitamin D2 11/25/2022 8  ug/L Final    25 OH Vitamin D3 11/25/2022 17  ug/L Final    25 OH Vit D Total 11/25/2022 25  20 - 75 ug/L Final    Season, race, dietary intake, and treatment affect the concentration of 25-hydroxy-Vitamin D. Values may decrease during winter months and increase during summer months. Values 20-29 ug/L may indicate Vitamin D insufficiency and values <20 ug/L may indicate Vitamin D deficiency.    WBC Count 11/25/2022 9.8  4.0 - 11.0 10e3/uL Final    RBC Count 11/25/2022 5.74  4.40 - 5.90 10e6/uL Final    Hemoglobin 11/25/2022 14.7  13.3 - 17.7 g/dL Final    Hematocrit 11/25/2022 45.5  40.0 - 53.0 % Final    MCV 11/25/2022 79  78 - 100 fL Final    MCH 11/25/2022 25.6 (L)  26.5 - 33.0 pg Final    MCHC 11/25/2022 32.3  31.5 - 36.5 g/dL Final    RDW 11/25/2022 13.6  10.0 - 15.0 % Final    Platelet Count 11/25/2022 328  150 - 450 10e3/uL Final     Sodium 11/25/2022 140  136 - 145 mmol/L Final    Potassium 11/25/2022 4.1  3.4 - 5.3 mmol/L Final    Chloride 11/25/2022 105  98 - 107 mmol/L Final    Carbon Dioxide (CO2) 11/25/2022 23  22 - 29 mmol/L Final    Anion Gap 11/25/2022 12  7 - 15 mmol/L Final    Urea Nitrogen 11/25/2022 8.4  6.0 - 20.0 mg/dL Final    Creatinine 11/25/2022 0.72  0.67 - 1.17 mg/dL Final    Calcium 11/25/2022 9.9  8.6 - 10.0 mg/dL Final    Glucose 11/25/2022 92  70 - 99 mg/dL Final    Alkaline Phosphatase 11/25/2022 90  40 - 129 U/L Final    AST 11/25/2022 24  10 - 50 U/L Final    ALT 11/25/2022 48  10 - 50 U/L Final    Protein Total 11/25/2022 7.5  6.4 - 8.3 g/dL Final    Albumin 11/25/2022 4.5  3.5 - 5.2 g/dL Final    Bilirubin Total 11/25/2022 0.4  <=1.2 mg/dL Final    GFR Estimate 11/25/2022 >90  >60 mL/min/1.73m2 Final    Effective December 21, 2021 eGFRcr in adults is calculated using the 2021 CKD-EPI creatinine equation which includes age and gender (Mckay et al., NEJ, DOI: 10.1056/VHBUkl3554367)    Cholesterol 11/25/2022 196  <200 mg/dL Final    Triglycerides 11/25/2022 143  <150 mg/dL Final    Direct Measure HDL 11/25/2022 44  >=40 mg/dL Final    LDL Cholesterol Calculated 11/25/2022 123 (H)  <=100 mg/dL Final    Non HDL Cholesterol 11/25/2022 152 (H)  <130 mg/dL Final    Hemoglobin A1C 11/25/2022 5.5  <5.7 % Final    Normal <5.7%   Prediabetes 5.7-6.4%    Diabetes 6.5% or higher     Note: Adopted from ADA consensus guidelines.    Parathyroid Hormone Intact 11/25/2022 26  15 - 65 pg/mL Final    Cotinine Confirm 11/25/2022 <15  ng/mL Final    Nicotine Confirmation Urine 11/25/2022 <15  ng/mL Final    Comment: INTERPRETIVE INFORMATION: Nicotine and Metabolites,                             Urine, Quantitative    Methodology: Quantitative Liquid Chromatography-Tandem Mass   Spectrometry    Positive cutoff:  Nicotine  15 ng/mL  Cotinine  15 ng/mL  3-OH-Cotinine 50 ng/mL  Anabasine        5 ng/mL    For medical purposes only; not valid  "for forensic use.     This test is designed to evaluate recent use of   nicotine-containing products.  Passive and active exposure   cannot be discriminated definitively, although a cutoff of   100 ng/mL cotinine is frequently used for surgery   qualification purposes.  For smoking cessation programs or   compliance testing, the absence of expected drug(s) and/or   drug metabolite(s) may indicate non-compliance,   inappropriate timing of specimen collection relative to   drug administration, poor drug absorption,   diluted/adulterated urine, or limitations of testing. The   concentration value must be greater than or equal to the   cutoff to be reported as                            positive. Anabasine is included as   a biomarker of tobacco use, versus nicotine replacement.    Interpretive questions should be directed to the   laboratory.     This test was developed and its performance characteristics   determined by [facility]. It has not been cleared or   approved by the US Food and Drug Administration. This test   was performed in a CLIA certified laboratory and is   intended for clinical purposes.  Performed By: [facility]  [address] [place] [id number]  : Medardo Thorne MD, PhD    3-HC-Ebqgvpft, Urn, Quant 11/25/2022 <50  ng/mL Final    Anabasine, Urn, Quant 11/25/2022 <5  ng/mL Final       PHYSICAL EXAM:  Objective    Ht 1.702 m (5' 7\")   Wt 134.7 kg (297 lb)   BMI 46.52 kg/m      Vitals - Patient Reported  Pain Score:  (pt unable to answer)      Vitals:  No vitals were obtained today due to virtual visit.        Sincerely,    Buffy Forbes PA-C      20 minutes spent by me on the date of the encounter doing chart review, history and exam, documentation and further activities per the noteVirtual Visit Details    Type of service:  Telephone Visit   Phone call duration: 20 minutes     "

## 2023-06-29 NOTE — PROGRESS NOTES
Return Medical Weight Management Note     Alcides Beauchamp  MRN:  [id number]  :  1990  REY:  2023    Dear Sanjay Farrell MD,    I had the pleasure of seeing your patient Alcides Beauchamp. He is a 33 year old male who I am continuing to see for treatment of obesity related to:        10/21/2021     2:43 PM   --   I have the following health issues associated with obesity Heart Disease    High Blood Pressure       Assessment & Plan   Problem List Items Addressed This Visit        Endocrine Diagnoses    Class 3 severe obesity due to excess calories with serious comorbidity and body mass index (BMI) of 45.0 to 49.9 in adult (H)    Relevant Medications    phentermine (ADIPEX-P) 15 MG capsule    topiramate (TOPAMAX) 25 MG tablet        Hx of lap band removal. Planning for sleeve or possible gastric bypass if needed with Dr Sanchez  Working on preop weight loss.  Today 297 and needs 290 lbs to schedule surgery  See Dr Sanchez  to discuss surgery plan. Hx of lap band removal. Plan for sleeve possible gastric bypass.    Plan to stop ozempic and phentermine 1 week prior to surgery. Can     INTERVAL HISTORY:    Anti-obesity medications:     Current:   [place]mg [place]  phentermine to 30mg daily  Increase topiramate to 50mg twice daily    CURRENT WEIGHT:   297 lbs 0 oz    Initial Weight (lbs): 320 lbs     Cumulative weight loss (lbs): 23  Weight Loss Percentage: 7.19%        3/29/2023     8:17 AM   Changes and Difficulties   I have made the following changes to my diet since my last visit: None   With regards to my diet, I am still struggling with: None   I have made the following changes to my activity/exercise since my last visit: None   With regards to my activity/exercise, I am still struggling with: Lack of time         MEDICATIONS:   Current Outpatient Medications   Medication Sig Dispense Refill     phentermine (ADIPEX-P) 15 MG capsule Take 2 capsules  (30 mg) by mouth every morning 60 capsule 5     topiramate (TOPAMAX) 25 MG tablet Take 2 tablets (50 mg) by mouth 2 times daily 120 tablet 5     buPROPion (WELLBUTRIN XL) 150 MG 24 hr tablet Take 1 tablet (150 mg) by mouth every morning For 4 days then increase to 2 tablets (300 mg) thereafter. Replaces duloxetine. (Patient not taking: Reported on 6/13/2023) 60 tablet 3           3/29/2023     8:17 AM   Weight Loss Medication History Reviewed With Patient   Which weight loss medications are you currently taking on a regular basis? Saxenda (liraglutide)    Topamax (topiramate)   Are you having any side effects from the weight loss medication that we have prescribed you? No       Lab on 11/25/2022   Component Date Value Ref Range Status     25 OH Vitamin D2 11/25/2022 8  ug/L Final     25 OH Vitamin D3 11/25/2022 17  ug/L Final     25 OH Vit D Total 11/25/2022 25  20 - 75 ug/L Final    Season, race, dietary intake, and treatment affect the concentration of 25-hydroxy-Vitamin D. Values may decrease during winter months and increase during summer months. Values 20-29 ug/L may indicate Vitamin D insufficiency and values <20 ug/L may indicate Vitamin D deficiency.     WBC Count 11/25/2022 9.8  4.0 - 11.0 10e3/uL Final     RBC Count 11/25/2022 5.74  4.40 - 5.90 10e6/uL Final     Hemoglobin 11/25/2022 14.7  13.3 - 17.7 g/dL Final     Hematocrit 11/25/2022 45.5  40.0 - 53.0 % Final     MCV 11/25/2022 79  78 - 100 fL Final     MCH 11/25/2022 25.6 (L)  26.5 - 33.0 pg Final     MCHC 11/25/2022 32.3  31.5 - 36.5 g/dL Final     RDW 11/25/2022 13.6  10.0 - 15.0 % Final     Platelet Count 11/25/2022 328  150 - 450 10e3/uL Final     Sodium 11/25/2022 140  136 - 145 mmol/L Final     Potassium 11/25/2022 4.1  3.4 - 5.3 mmol/L Final     Chloride 11/25/2022 105  98 - 107 mmol/L Final     Carbon Dioxide (CO2) 11/25/2022 23  22 - 29 mmol/L Final     Anion Gap 11/25/2022 12  7 - 15 mmol/L Final     Urea Nitrogen 11/25/2022 8.4  6.0 - 20.0  mg/dL Final     Creatinine 11/25/2022 0.72  0.67 - 1.17 mg/dL Final     Calcium 11/25/2022 9.9  8.6 - 10.0 mg/dL Final     Glucose 11/25/2022 92  70 - 99 mg/dL Final     Alkaline Phosphatase 11/25/2022 90  40 - 129 U/L Final     AST 11/25/2022 24  10 - 50 U/L Final     ALT 11/25/2022 48  10 - 50 U/L Final     Protein Total 11/25/2022 7.5  6.4 - 8.3 g/dL Final     Albumin 11/25/2022 4.5  3.5 - 5.2 g/dL Final     Bilirubin Total 11/25/2022 0.4  <=1.2 mg/dL Final     GFR Estimate 11/25/2022 >90  >60 mL/min/1.73m2 Final    Effective December 21, 2021 eGFRcr in adults is calculated using the 2021 CKD-EPI creatinine equation which includes age and gender (Mckay et al., [place], DOI: 10.1056/HTELjo6316171)     Cholesterol 11/25/2022 196  <200 mg/dL Final     Triglycerides 11/25/2022 143  <150 mg/dL Final     Direct Measure HDL 11/25/2022 44  >=40 mg/dL Final     LDL Cholesterol Calculated 11/25/2022 123 (H)  <=100 mg/dL Final     Non HDL Cholesterol 11/25/2022 152 (H)  <130 mg/dL Final     Hemoglobin A1C 11/25/2022 5.5  <5.7 % Final    Normal <5.7%   Prediabetes 5.7-6.4%    Diabetes 6.5% or higher     Note: Adopted from ADA consensus guidelines.     Parathyroid Hormone Intact 11/25/2022 26  15 - 65 pg/mL Final     Cotinine Confirm 11/25/2022 <15  ng/mL Final     Nicotine Confirmation Urine 11/25/2022 <15  ng/mL Final    Comment: INTERPRETIVE INFORMATION: Nicotine and Metabolites,                             Urine, Quantitative    Methodology: Quantitative Liquid Chromatography-Tandem Mass   Spectrometry    Positive cutoff:  Nicotine  15 ng/mL  Cotinine  15 ng/mL  3-OH-Cotinine 50 ng/mL  Anabasine        5 ng/mL    For medical purposes only; not valid for forensic use.     This test is designed to evaluate recent use of   nicotine-containing products.  Passive and active exposure   cannot be discriminated definitively, although a cutoff of   100 ng/mL cotinine is frequently used for surgery   qualification purposes.  For  "smoking cessation programs or   compliance testing, the absence of expected drug(s) and/or   drug metabolite(s) may indicate non-compliance,   inappropriate timing of specimen collection relative to   drug administration, poor drug absorption,   diluted/adulterated urine, or limitations of testing. The   concentration value must be greater than or equal to the   cutoff to be reported as                            positive. Anabasine is included as   a biomarker of tobacco use, versus nicotine replacement.    Interpretive questions should be directed to the   laboratory.     This test was developed and its performance characteristics   determined by [facility]. It has not been cleared or   approved by the US Food and Drug Administration. This test   was performed in a CLIA certified laboratory and is   intended for clinical purposes.  Performed By: [facility]  [address] [place] [id number]  : Medardo Thorne MD, PhD     3-WH-Bxiwhyvb, Urn, Quant 11/25/2022 <50  ng/mL Final     Anabasine, Urn, Quant 11/25/2022 <5  ng/mL Final       PHYSICAL EXAM:  Objective    Ht 1.702 m (5' 7\")   Wt 134.7 kg (297 lb)   BMI 46.52 kg/m      Vitals - Patient Reported  Pain Score:  (pt unable to answer)      Vitals:  No vitals were obtained today due to virtual visit.        Sincerely,    Buffy Forbes PA-C      20 minutes spent by me on the date of the encounter doing chart review, history and exam, documentation and further activities per the noteVirtual Visit Details    Type of service:  Telephone Visit   Phone call duration: 20 minutes   "

## 2023-07-01 NOTE — TELEPHONE ENCOUNTER
Central Prior Authorization Team   Phone: [phone]      PRIOR AUTHORIZATION DENIED    Medication: PHENTERMINE HCL 37.5 MG PO TABS  Insurance Company: [employer] [place] - Phone [phone] Fax [phone]  Denial Date: 6/29/2023  Denial Rational:       Appeal Information:           Patient Notified: No Please note: Providers/Clinics are to notify the patients of the denial outcomes and steps going forward.

## 2023-07-05 ENCOUNTER — MYC MEDICAL ADVICE (OUTPATIENT)
Dept: ENDOCRINOLOGY | Facility: CLINIC | Age: 33
End: 2023-07-05

## 2023-07-14 ENCOUNTER — TELEPHONE (OUTPATIENT)
Dept: ENDOCRINOLOGY | Facility: CLINIC | Age: 33
End: 2023-07-14

## 2023-07-14 DIAGNOSIS — E66.813 CLASS 3 SEVERE OBESITY DUE TO EXCESS CALORIES WITH SERIOUS COMORBIDITY AND BODY MASS INDEX (BMI) OF 45.0 TO 49.9 IN ADULT (H): Primary | ICD-10-CM

## 2023-07-14 DIAGNOSIS — E66.01 CLASS 3 SEVERE OBESITY DUE TO EXCESS CALORIES WITH SERIOUS COMORBIDITY AND BODY MASS INDEX (BMI) OF 45.0 TO 49.9 IN ADULT (H): Primary | ICD-10-CM

## 2023-07-14 DIAGNOSIS — Z71.3 NUTRITIONAL COUNSELING: ICD-10-CM

## 2023-07-14 PROCEDURE — 99207 PR NO CHARGE LOS: CPT | Performed by: DIETITIAN, REGISTERED

## 2023-07-14 NOTE — TELEPHONE ENCOUNTER
Completed a brief phone visit with pt. He reports diet and weight loss have been going well. He is now at current weight of 290 lbs. He continues to follow intermittent fasting, eating 1-2 low-carb/high-protein meals daily and occaissional fruit or protein drink as a snack between. States he started Ozempic 3 weeks ago and has experienced a beneficial reduction on appetite. He has a follow-up with Dr. Sanchez to finalize plan for surgery. Scheduled follow-up with RD on 8/4 to review post-op diet transition.     Phone call duration: 6 mins    Lisa Rg RD, LD  Clinical Dietitian   Comprehensive Weight Management Program  [facility] [place]

## 2023-07-19 ENCOUNTER — OFFICE VISIT (OUTPATIENT)
Dept: ENDOCRINOLOGY | Facility: CLINIC | Age: 33
End: 2023-07-19
Payer: COMMERCIAL

## 2023-07-19 VITALS
HEART RATE: 98 BPM | SYSTOLIC BLOOD PRESSURE: 111 MMHG | BODY MASS INDEX: 45.47 KG/M2 | HEIGHT: 67 IN | WEIGHT: 289.7 LBS | DIASTOLIC BLOOD PRESSURE: 60 MMHG | OXYGEN SATURATION: 97 %

## 2023-07-19 DIAGNOSIS — E66.01 MORBID OBESITY (H): Primary | ICD-10-CM

## 2023-07-19 PROCEDURE — 99213 OFFICE O/P EST LOW 20 MIN: CPT | Mod: GC | Performed by: SURGERY

## 2023-07-19 RX ORDER — SEMAGLUTIDE 1.34 MG/ML
1 INJECTION, SOLUTION SUBCUTANEOUS
COMMUNITY

## 2023-07-19 RX ORDER — CEFAZOLIN SODIUM IN 0.9 % NACL 3 G/100 ML
3 INTRAVENOUS SOLUTION, PIGGYBACK (ML) INTRAVENOUS
Status: CANCELLED | OUTPATIENT
Start: 2023-07-19

## 2023-07-19 RX ORDER — ACETAMINOPHEN 325 MG/1
975 TABLET ORAL ONCE
Status: CANCELLED | OUTPATIENT
Start: 2023-07-19 | End: 2023-07-19

## 2023-07-19 RX ORDER — CEFAZOLIN SODIUM IN 0.9 % NACL 3 G/100 ML
3 INTRAVENOUS SOLUTION, PIGGYBACK (ML) INTRAVENOUS SEE ADMIN INSTRUCTIONS
Status: CANCELLED | OUTPATIENT
Start: 2023-07-19

## 2023-07-19 ASSESSMENT — PAIN SCALES - GENERAL: PAINLEVEL: NO PAIN (0)

## 2023-07-19 NOTE — PROGRESS NOTES
New General Surgery Consultation Note        Alcides Beauchamp  [id number]  1990 July 19, 2023     Requesting Provider: No ref. provider found     Dear Sanjay Reno,     I had the pleasure of seeing your patient, Alcides Beauchamp is a 33 year old male who presents to clinic today for the following health issues       CHIEF COMPLAINT:       No data to display            Weight gain following removal lap band    Assessment & Plan   Problem List Items Addressed This Visit    None  Visit Diagnoses     Morbid obesity (H)    -  Primary    Relevant Medications    Semaglutide, 1 MG/DOSE, (OZEMPIC, 1 MG/DOSE,) 4 MG/3ML pen    Other Relevant Orders    PAC Visit Referral (For [facility] Only)       33 year old man with history of lap band c/b PO intolerance now s/p lap band removal with significant weight regain despite multiple medications, diet and exercise.     - Plan for robotic assisted brunidla-en-y gastric bypass  - Anticipate difficult dissection, should plan for this to be the only case scheduled for the day in question. Will need coordination with Dr. Pace as well. If dissection or operation becomes too challenging to proceed, may abort operation. This was discussed with patient who is agreeable with plan.   - Stop phentermine and Ozempic one week prior to surgery as instructed by Buffy Forbes      Lab Results   Component Value Date    WBC 9.8 11/25/2022     Lab Results   Component Value Date    RBC 5.74 11/25/2022     Lab Results   Component Value Date    HGB 14.7 11/25/2022     Lab Results   Component Value Date    HCT 45.5 11/25/2022     No components found for: MCT  Lab Results   Component Value Date    MCV 79 11/25/2022     Lab Results   Component Value Date    MCH 25.6 11/25/2022     Lab Results   Component Value Date    MCHC 32.3 11/25/2022     Lab Results   Component Value Date    RDW 13.6 11/25/2022     Lab Results   Component Value Date     11/25/2022     Last Comprehensive Metabolic Panel:  Lab Results    Component Value Date     11/25/2022    POTASSIUM 4.1 11/25/2022    CHLORIDE 105 11/25/2022    CO2 23 11/25/2022    ANIONGAP 12 11/25/2022    GLC 92 11/25/2022    BUN 8.4 11/25/2022    CR 0.72 11/25/2022    GFRESTIMATED >90 11/25/2022    SKYLER 9.9 11/25/2022         INR/Prothrombin Time  Liver Function Studies -   Recent Labs   Lab Test 11/25/22  0954   PROTTOTAL 7.5   ALBUMIN 4.5   BILITOTAL 0.4   ALKPHOS 90   AST 24   ALT 48     [unfilled]        HISTORY OF PRESENT ILLNESS:  33 year old man with history of lap band with good results however band became too tight requiring removal. Both operations were done in [place]. Since removal of band he experienced significant weight regain despite multiple attempts to control his weight. He has intermittent reflux and snores, which he associates with his obesity. He has been able to loose to his goal weight with the help of medications. Weight today is 289, goal was 290. He plans to continue to loose up until the date of surgery.    PAST MEDICAL HISTORY:  No past medical history on file.     PAST SURGICAL HISTORY:  Past Surgical History:   Procedure Laterality Date     ESOPHAGOSCOPY, GASTROSCOPY, DUODENOSCOPY (EGD), COMBINED N/A 10/07/2021    Procedure: ESOPHAGOGASTRODUODENOSCOPY (EGD);  Surgeon: Liss Durham MD;  Location: [facility]     ESOPHAGOSCOPY, GASTROSCOPY, DUODENOSCOPY (EGD), COMBINED N/A 4/13/2022    Procedure: ESOPHAGOGASTRODUODENOSCOPY (EGD);  Surgeon: Kameron Sanchez MD;  Location: [facility]     LAP ADJUSTABLE GASTRIC BAND  2009    in [place]     LAPAROSCOPIC REMOVAL GASTRIC ADJUSTABLE BAND  2020    in [place]        MEDICATIONS:  Current Outpatient Medications   Medication     buPROPion (WELLBUTRIN XL) 150 MG 24 hr tablet     phentermine (ADIPEX-P) 15 MG capsule     Semaglutide, 1 MG/DOSE, (OZEMPIC, 1 MG/DOSE,) 4 MG/3ML pen     topiramate (TOPAMAX) 25 MG tablet     No current facility-administered medications for this visit.        ALLERGIES:  No  "Known Allergies     SOCIAL HISTORY:  Social History     Socioeconomic History     Marital status:      Spouse name: None     Number of children: None     Years of education: None     Highest education level: None   Tobacco Use     Smoking status: Former     Types: Cigarettes     Smokeless tobacco: Never     Tobacco comments:     Quit 6 weeks ago    Vaping Use     Vaping Use: Never used       FAMILY HISTORY:  No family history on file.     CTAP reviewed.     PHYSICAL EXAM:  Objective    /60 (BP Location: Left arm, Patient Position: Sitting, Cuff Size: Adult Large)   Pulse 98   Ht 1.702 m (5' 7\")   Wt 131.4 kg (289 lb 11.2 oz)   SpO2 97%   BMI 45.37 kg/m    /60 (BP Location: Left arm, Patient Position: Sitting, Cuff Size: Adult Large)   Pulse 98   Ht 1.702 m (5' 7\")   Wt 131.4 kg (289 lb 11.2 oz)   SpO2 97%   BMI 45.37 kg/m    Body mass index is 45.37 kg/m .  Physical Exam   GENERAL: healthy, alert and no distress  RESP: NLB on RA  CV: RRR  ABDOMEN: soft, obese, nontender, well healed surgical scars.   MS: no gross musculoskeletal defects noted, no edema    DISCUSSION OF RISKS:  I reviewed the risks of surgery with Alcides Beauchamp.    These include, but are not limited to, death, myocardial infarction, pneumonia, urinary tract infection, deep venous thrombosis with or without pulmonary embolus, abdominal infection from bowel injury or abscess, bowel obstruction, wound infection, and bleeding.    More specific risks related to gastric bypass were detailed at the bariatric informational seminar and include the followin.) leak at the gastrojejunostomy or jejunojejunostomy anastomosis, 2.) leak at the gastric remnant staple line, 3.) stricture at the gastrojejunostomy or jejunojejunostomy anastomosis, 4.) nausea, vomiting, and dehydration for several months, 5.) internal hernia or adhesions causing bowel obstruction, 6.) rapid weight loss causing a higher rate of gallstone formation during the " first 6 months after surgery, 7.) difficulty accessing the lower stomach and duodenum for the rest of life, 8) decreased absorption of vitamins because of the altered gastric bypass anatomy, 9.) increased risk of peptic ulcer (marginal ulcer), especially for smokers and NSAID users, 10.) risk of low sugars caused by high insulin release especially in patients who do not have diabetes.      ----- Service Performed and Documented by Resident or Fellow ------        Sincerely,     Felicitas Lyman MD

## 2023-07-19 NOTE — NURSING NOTE
"(   Chief Complaint   Patient presents with     RECHECK     Bariatric surgery consult    )    ( Weight: 131.4 kg (289 lb 11.2 oz) )  ( Height: 170.2 cm (5' 7\") )  ( BMI (Calculated): 45.37 )  (   )  (   )  (   )  (   )  (   )  (   )    ( BP: 111/60 )  (   )  (   )  (   )  ( Pulse: 98 )  (   )  ( SpO2: 97 % )    (   Patient Active Problem List   Diagnosis     Class 3 severe obesity due to excess calories with serious comorbidity and body mass index (BMI) of 45.0 to 49.9 in adult (H)     History of removal of laparoscopic gastric banding device     Rash    )  (   Current Outpatient Medications   Medication Sig Dispense Refill     buPROPion (WELLBUTRIN XL) 150 MG 24 hr tablet Take 1 tablet (150 mg) by mouth every morning For 4 days then increase to 2 tablets (300 mg) thereafter. Replaces duloxetine. 60 tablet 3     phentermine (ADIPEX-P) 15 MG capsule Take 2 capsules (30 mg) by mouth every morning 60 capsule 5     Semaglutide, 1 MG/DOSE, (OZEMPIC, 1 MG/DOSE,) 4 MG/3ML pen Inject 1 mg Subcutaneous every 7 days [place]ought [place]       topiramate (TOPAMAX) 25 MG tablet Take 2 tablets (50 mg) by mouth 2 times daily 120 tablet 5    )  ( Diabetes Eval:    )    ( Pain Eval:  No Pain (0) )    ( Wound Eval:       )    (   History   Smoking Status     Former     Types: Cigarettes   Smokeless Tobacco     Never    )    ( Signed By:  Devin Read, EMT; July 19, 2023; 9:00 AM )    "

## 2023-07-19 NOTE — LETTER
7/19/2023       RE: Alcides Beauchamp  [address]  [place] [address]     Dear Colleague,    Thank you for referring your patient, Alcides Beauchamp, to the [facility] [place] at [facility]. Please see a copy of my visit note below.    New General Surgery Consultation Note        Alcides Beauchamp  [id number]  1990 July 19, 2023     Requesting Provider: No ref. provider found     Dear Sanjay Reno,     I had the pleasure of seeing your patient, Alcides Beauchamp is a 33 year old male who presents to clinic today for the following health issues       CHIEF COMPLAINT:       No data to display            Weight gain following removal lap band    Assessment & Plan   Problem List Items Addressed This Visit    None  Visit Diagnoses       Morbid obesity (H)    -  Primary    Relevant Medications    Semaglutide, 1 MG/DOSE, (OZEMPIC, 1 MG/DOSE,) 4 MG/3ML pen    Other Relevant Orders    PAC Visit Referral (For [facility] Only)         33 year old man with history of lap band c/b PO intolerance now s/p lap band removal with significant weight regain despite multiple medications, diet and exercise.     - Plan for robotic assisted brunilda-en-y gastric bypass  - Anticipate difficult dissection, should plan for this to be the only case scheduled for the day in question. Will need coordination with Dr. Pace as well. If dissection or operation becomes too challenging to proceed, may abort operation. This was discussed with patient who is agreeable with plan.   - Stop phentermine and Ozempic one week prior to surgery as instructed by Buffy Forbes      Lab Results   Component Value Date    WBC 9.8 11/25/2022     Lab Results   Component Value Date    RBC 5.74 11/25/2022     Lab Results   Component Value Date    HGB 14.7 11/25/2022     Lab Results   Component Value Date    HCT 45.5 11/25/2022     No components found for: MCT  Lab Results   Component Value Date    MCV  79 11/25/2022     Lab Results   Component Value Date    MCH 25.6 11/25/2022     Lab Results   Component Value Date    MCHC 32.3 11/25/2022     Lab Results   Component Value Date    RDW 13.6 11/25/2022     Lab Results   Component Value Date     11/25/2022     Last Comprehensive Metabolic Panel:  Lab Results   Component Value Date     11/25/2022    POTASSIUM 4.1 11/25/2022    CHLORIDE 105 11/25/2022    CO2 23 11/25/2022    ANIONGAP 12 11/25/2022    GLC 92 11/25/2022    BUN 8.4 11/25/2022    CR 0.72 11/25/2022    GFRESTIMATED >90 11/25/2022    SKYLER 9.9 11/25/2022         INR/Prothrombin Time  Liver Function Studies -   Recent Labs   Lab Test 11/25/22  0954   PROTTOTAL 7.5   ALBUMIN 4.5   BILITOTAL 0.4   ALKPHOS 90   AST 24   ALT 48     [unfilled]        HISTORY OF PRESENT ILLNESS:  33 year old man with history of lap band with good results however band became too tight requiring removal. Both operations were done in [place]. Since removal of band he experienced significant weight regain despite multiple attempts to control his weight. He has intermittent reflux and snores, which he associates with his obesity. He has been able to loose to his goal weight with the help of medications. Weight today is 289, goal was 290. He plans to continue to loose up until the date of surgery.    PAST MEDICAL HISTORY:  No past medical history on file.     PAST SURGICAL HISTORY:  Past Surgical History:   Procedure Laterality Date    ESOPHAGOSCOPY, GASTROSCOPY, DUODENOSCOPY (EGD), COMBINED N/A 10/07/2021    Procedure: ESOPHAGOGASTRODUODENOSCOPY (EGD);  Surgeon: Liss Durham MD;  Location: [facility]    ESOPHAGOSCOPY, GASTROSCOPY, DUODENOSCOPY (EGD), COMBINED N/A 4/13/2022    Procedure: ESOPHAGOGASTRODUODENOSCOPY (EGD);  Surgeon: Kameron Sanchez MD;  Location: [facility]    LAP ADJUSTABLE GASTRIC BAND  2009    in [place]    LAPAROSCOPIC REMOVAL GASTRIC ADJUSTABLE BAND  2020    in [place]        MEDICATIONS:  Current  "Outpatient Medications   Medication    buPROPion (WELLBUTRIN XL) 150 MG 24 hr tablet    phentermine (ADIPEX-P) 15 MG capsule    Semaglutide, 1 MG/DOSE, (OZEMPIC, 1 MG/DOSE,) 4 MG/3ML pen    topiramate (TOPAMAX) 25 MG tablet     No current facility-administered medications for this visit.        ALLERGIES:  No Known Allergies     SOCIAL HISTORY:  Social History     Socioeconomic History    Marital status:      Spouse name: None    Number of children: None    Years of education: None    Highest education level: None   Tobacco Use    Smoking status: Former     Types: Cigarettes    Smokeless tobacco: Never    Tobacco comments:     Quit 6 weeks ago    Vaping Use    Vaping Use: Never used       FAMILY HISTORY:  No family history on file.     CTAP reviewed.     PHYSICAL EXAM:  Objective    /60 (BP Location: Left arm, Patient Position: Sitting, Cuff Size: Adult Large)   Pulse 98   Ht 1.702 m (5' 7\")   Wt 131.4 kg (289 lb 11.2 oz)   SpO2 97%   BMI 45.37 kg/m    /60 (BP Location: Left arm, Patient Position: Sitting, Cuff Size: Adult Large)   Pulse 98   Ht 1.702 m (5' 7\")   Wt 131.4 kg (289 lb 11.2 oz)   SpO2 97%   BMI 45.37 kg/m    Body mass index is 45.37 kg/m .  Physical Exam   GENERAL: healthy, alert and no distress  RESP: NLB on RA  CV: RRR  ABDOMEN: soft, obese, nontender, well healed surgical scars.   MS: no gross musculoskeletal defects noted, no edema    DISCUSSION OF RISKS:  I reviewed the risks of surgery with Alcides Beauchamp.    These include, but are not limited to, death, myocardial infarction, pneumonia, urinary tract infection, deep venous thrombosis with or without pulmonary embolus, abdominal infection from bowel injury or abscess, bowel obstruction, wound infection, and bleeding.    More specific risks related to gastric bypass were detailed at the bariatric informational seminar and include the followin.) leak at the gastrojejunostomy or jejunojejunostomy anastomosis, 2.) leak " at the gastric remnant staple line, 3.) stricture at the gastrojejunostomy or jejunojejunostomy anastomosis, 4.) nausea, vomiting, and dehydration for several months, 5.) internal hernia or adhesions causing bowel obstruction, 6.) rapid weight loss causing a higher rate of gallstone formation during the first 6 months after surgery, 7.) difficulty accessing the lower stomach and duodenum for the rest of life, 8) decreased absorption of vitamins because of the altered gastric bypass anatomy, 9.) increased risk of peptic ulcer (marginal ulcer), especially for smokers and NSAID users, 10.) risk of low sugars caused by high insulin release especially in patients who do not have diabetes.      ----- Service Performed and Documented by Resident or Fellow ------       Sincerely,     Felicitas Lyman MD      Attestation signed by Kameron Sanchez MD at 7/19/2023 10:27 AM:  I saw and evaluated the patient. I agree with the findings and the plan of care as documented in the resident s note.    Kameron Sanchez MD

## 2023-08-03 NOTE — PROGRESS NOTES
"Phone-Visit Details    Type of service:  Phone Visit    Phone call duration: 26 minutes    Distant Location (provider location): Offsite (providers home) [facility] [place]        Return Bariatric Nutrition Consultation Note    Reason For Visit: Nutrition Assessment    Alcides Beauchamp is a 31 year old presenting today for a return bariatric nutrition consult and nutrition education regarding clear and low-fat full liquid diet for post-bariatric surgery.  Pt is interested in robotic assisted brunilda-en-y gastric bypass with Dr. Sanchez expected surgery in TBD.  Patient is accompanied by self.  Pt has met required nutrition visits prior to surgery.     Pt referred by ANITHA Johnson on February 15, 2022.  Patient with Co-morbidities of obesity including:  Type II DM no  Renal Failure no  Sleep apnea yes  Hypertension no   Dyslipidemia no  Joint pain no  Back pain no  GERD no         10/21/2021     2:43 PM   Support System Reviewed With Patient   Who do you have in your support network that can be available to help you for the first 2 weeks after surgery? wife and family   Who can you count on for support throughout your weight loss surgery journey? wife and family       ANTHROPOMETRICS:  Initial consult weight (4/27/2022): 307 lbs     Current:   Estimated body mass index is 44.78 kg/m  as calculated from the following:    Height as of this encounter: 1.702 m (5' 7.01\").    Weight as of this encounter: 129.7 kg (286 lb).  (-4 lbs over last 1 month, -21 bs from initial)     Required weight loss goal pre-op: -20 lbs from initial consult weight (goal weight 287 lbs or less before surgery)          10/21/2021     2:43 PM   --   I have tried the following methods to lose weight Weight Loss Surgery           10/21/2021     2:43 PM   Weight Loss Questions Reviewed With Patient   How long have you been overweight? Since puberty       SUPPLEMENT INFORMATION:  None     Medications for Weight " "Loss:  Phentermine, topiramate   Ozempic     NUTRITION HISTORY:  Hx of lap band (2009). S/p band removal. Gained 100 lbs within 6 months of removal. Weight has fluctuated +/- 20 lbs over past 1-2 years. Planning for sleeve gastrectomy with Dr. Sanchez, however noted at his last visit 12/14/23 - \"However if we find anatomically this is challenging we would perform a Homero-en-Y gastric bypass.\"    Pt reports he has been following IF, which has been helping as he has lost 10 lbs in the past 2 weeks. He is eating 1-2 meals daily. Working on cutting out sugar from intake and increase lean protein and veggies at meals.     Today - PT reports running out of his Ozempic, but fortunately has continued to feel positive effects on appetite/satiety and has maintained his weight loss.       Progress on Previous Goals  1) Consume 60 gm protein daily, ideally between 2-3 smaller meals - Met, continues  2) Continue time restricted eating (intermittent fasting) - Met, continues    ADDITIONAL INFORMATION:  Physical activity- moves around a lot at work        10/21/2021     2:43 PM   Dining Out History Reviewed With Patient   How often do you dine out? A couple of times a week.   Where do you dine out? (select all that apply) sit-down restaurants    fast food chains   What types of food do you order when you dine out? shira alejandro potatoe pop           2/7/2022     1:48 PM   Physical Activity Reviewed With Patient   How often do you exercise? Never   What keeps you from being more active? Lack of Time       NUTRITION DIAGNOSIS:  Obesity r/t long history of positive energy balance aeb BMI >30 kg/m2.    Food- and Nutrition-related knowledge deficit r/t lack of prior exposure to information AEB pt scheduled for upcoming bariatric surgery and pt interest in diet education/review    INTERVENTION:  Intervention Provided/Education Provided/Reviewed previous goals and encouraged patient to continue goals prior to surgery.     Provided " instruction on bariatric clear and low-fat full liquid diets. Provided written/verbal education on Dumping Syndrome and prevention.     Provided the following handouts: Diet Guidelines for Bariatric Surgery, Your Stage 1-5 Diet, Keeping Track of Your Fluids, list of recommended vitamin/mineral supplementation after sleeve gastrectomy surgery and RD contact information. Sent to pt email ([email or web address]) per pt request.           10/21/2021     2:43 PM   Questions Reviewed With Patient   How ready are you to make changes regarding your weight? Number 1 = Not ready at all to make changes up to 10 = very ready. 10   How confident are you that you can change? 1 = Not confident that you will be successful making changes up to 10 = very confident. 10       Expected Engagement: good    Goals after surgery:   1. Follow the bariatric post-op diet advancement schedule (see below)  2. Sip on 48-64 oz (or greater) fluids daily, recording intake to help stay on-track.  - Drink at least 1-2 oz of fluid every 15-30 min.  3. Stop vitamins/minerals 1 week before surgery. We will discuss starting a chewable multivitamin at the one week post-op visit.   4. Work towards consuming 60 gm protein daily.     Post-op Diet Advancement Schedule:  Clear Liquid Diet (stage 1): starts day before surgery (lasts for 1 week)  Low-Fat Full Liquid Diet (stage 2): starts one week after surgery (last for 1 week)  Pureed Diet (stage 3): starts 2 weeks after surgery (lasts 2 weeks)  Soft Diet (stage 4): starts 4 weeks after surgery (lasts 4 weeks)  Regular Diet (stage 5): starts 8 weeks after surgery      Post-op Diet Handouts:  Diet Guidelines after Weight-loss Surgery  [email or web address]     Your Stage 1 Diet: Clear Liquids  [email or web address]     Your Stage 2 Diet: Low-fat Full Liquids  [email or web address]     Your Stage 3 Diet: Pureed Foods  [email or web address]     Pureed  Recipes  [email or web address]    Your Stage 4 Diet: Soft Foods  [email or web address]    Your Stage 5 Diet: Regular Foods  [email or web address]    Supplements after Sleeve Gastrectomy, Gastric Bypass or Single Anastomosis Duodenal Switch  [email or web address]    Keeping Track of Fluids  [email or web address]    After Weight Loss Surgery    Preventing Dumping Syndrome after Weight Loss Surgery  [email or web address]     Preventing Low Blood Sugar after Weight Loss Surgery  [email or web address]     Protein Supplements for After Surgery:   Unflavored protein powder: Genepro, Isopure (25-30 gm protein per 1 scoop)  You may also use flavored protein powder if you prefer.   Protein shots: https://store."[employer]"/collections/protein-shots  Pre-made protein shakes (no more than 210 Calories, at least 20 grams of protein, and less than 10 grams of sugar):   Premier Protein (160 Calories, 30 g protein)  Boost/Ensure Max (160 calories, 30 gm protein)   Logic Nation Core Power (170 calories, 26 gm protein)  Aldi's Elevation Protein Shake (160 calories, 30 gm protein)   Equate Protein Shake (160 calories, 30 gm protein)  Slim Fast Advanced Nutrition (180 Calories, 20 g protein)  Muscle Milk, lactose-free, 17 oz bottle (210 Calories, 30 g protein)  Glucerna Protein Smart (150 vera, 30 gm protein)  Clear Protein Drinks:  BiPro  Premier Protein clear  [id number]  M Health Protein 15 Concentrate  Bone broth  Beneprotein protein powder mixed with 4-6 oz of fluid  [id number]    Chewable Multivitamin Options for After Surgery:  Bariatric Advantage Advanced Multi EA chewable: https://www.bariatricHexoskin ([employer]).Homeforswap/item/chewable-advanced-multi-ea  Take 2 chews daily. Additional calcium citrate supplement needed.  - Validation code for Discount on Bariatric Advantage vitamin/mineral supplements: FSWLSBA    OR    Celebrate Multi Complete 45:  https://RoomClip.[employer]/products/yvesb-yepsjpue-38?[email or web address]  Take 2 chews daily. Additional calcium citrate supplement needed.      Follow up: 1 month, or 1 week post-op    Time spent with patient: 26 minutes.  Lisa Rg RD, LD

## 2023-08-04 ENCOUNTER — VIRTUAL VISIT (OUTPATIENT)
Dept: ENDOCRINOLOGY | Facility: CLINIC | Age: 33
End: 2023-08-04
Payer: COMMERCIAL

## 2023-08-04 VITALS — BODY MASS INDEX: 44.89 KG/M2 | HEIGHT: 67 IN | WEIGHT: 286 LBS

## 2023-08-04 DIAGNOSIS — Z71.3 NUTRITIONAL COUNSELING: Primary | ICD-10-CM

## 2023-08-04 DIAGNOSIS — E66.01 CLASS 3 SEVERE OBESITY DUE TO EXCESS CALORIES WITH SERIOUS COMORBIDITY AND BODY MASS INDEX (BMI) OF 45.0 TO 49.9 IN ADULT (H): ICD-10-CM

## 2023-08-04 DIAGNOSIS — E66.813 CLASS 3 SEVERE OBESITY DUE TO EXCESS CALORIES WITH SERIOUS COMORBIDITY AND BODY MASS INDEX (BMI) OF 45.0 TO 49.9 IN ADULT (H): ICD-10-CM

## 2023-08-04 PROCEDURE — 99207 PR NO CHARGE LOS: CPT | Mod: 93 | Performed by: DIETITIAN, REGISTERED

## 2023-08-04 PROCEDURE — 97803 MED NUTRITION INDIV SUBSEQ: CPT | Mod: 93 | Performed by: DIETITIAN, REGISTERED

## 2023-08-04 ASSESSMENT — PAIN SCALES - GENERAL: PAINLEVEL: NO PAIN (0)

## 2023-08-04 NOTE — NURSING NOTE
Is the patient currently in the state of [place]? YES    Visit mode:TELEPHONE    If the visit is dropped, the patient can be reconnected by: TELEPHONE VISIT: Phone number: [phone]    Will anyone else be joining the visit? NO      How would you like to obtain your AVS? MyChart    Are changes needed to the allergy or medication list? NO    Reason for visit: Telephone (Recheck)

## 2023-08-04 NOTE — PATIENT INSTRUCTIONS
Glen Mcgrath,    Follow-up with RD in 1 month, or 1 week post-op.     Thank you,    Lisa Rg, HAYES, LD  If you would like to schedule or reschedule an appointment with the RD, please call [phone]    Nutrition Goals  Goals after surgery:   1. Follow the bariatric post-op diet advancement schedule (see below)  2. Sip on 48-64 oz (or greater) fluids daily, recording intake to help stay on-track.  - Drink at least 1-2 oz of fluid every 15-30 min.  3. Stop vitamins/minerals 1 week before surgery. We will discuss starting a chewable multivitamin at the one week post-op visit.   4. Work towards consuming 60 gm protein daily.     Post-op Diet Advancement Schedule:  Clear Liquid Diet (stage 1): starts day before surgery (lasts for 1 week)  Low-Fat Full Liquid Diet (stage 2): starts one week after surgery (last for 1 week)  Pureed Diet (stage 3): starts 2 weeks after surgery (lasts 2 weeks)  Soft Diet (stage 4): starts 4 weeks after surgery (lasts 4 weeks)  Regular Diet (stage 5): starts 8 weeks after surgery    [email or web address]     Post-op Diet Handouts:  Diet Guidelines after Weight-loss Surgery  [email or web address]     Your Stage 1 Diet: Clear Liquids  [email or web address]     Your Stage 2 Diet: Low-fat Full Liquids  [email or web address]     Your Stage 3 Diet: Pureed Foods  [email or web address]     Pureed Recipes  [email or web address]    Your Stage 4 Diet: Soft Foods  [email or web address]    Your Stage 5 Diet: Regular Foods  [email or web address]    Supplements after Sleeve Gastrectomy, Gastric Bypass or Single Anastomosis Duodenal Switch  [email or web address]    Keeping Track of Fluids  [email or web address]    After Weight Loss Surgery    Preventing Dumping Syndrome after Weight Loss Surgery  [email or web address]     Preventing Low Blood Sugar after Weight Loss Surgery  [email or web address]     Protein  Supplements for After Surgery:   Unflavored protein powder: Genepro, Isopure (25-30 gm protein per 1 scoop)  You may also use flavored protein powder if you prefer.   Protein shots: https://store.GreenSand.Water Science Technologies/collections/protein-shots  Pre-made protein shakes (no more than 210 Calories, at least 20 grams of protein, and less than 10 grams of sugar):   Premier Protein (160 Calories, 30 g protein)  Boost/Ensure Max (160 calories, 30 gm protein)   Fairlife Core Power (170 calories, 26 gm protein)  Aldi's Elevation Protein Shake (160 calories, 30 gm protein)   Equate Protein Shake (160 calories, 30 gm protein)  Slim Fast Advanced Nutrition (180 Calories, 20 g protein)  Muscle Milk, lactose-free, 17 oz bottle (210 Calories, 30 g protein)  Glucerna Protein Smart (150 vera, 30 gm protein)  Clear Protein Drinks:  BiPro  Premier Protein clear  [id number]  M Health Protein 15 Concentrate  Bone broth  Beneprotein protein powder mixed with 4-6 oz of fluid  [email or web address]    Chewable Multivitamin Options for After Surgery:  Bariatric Advantage Advanced Multi EA chewable: https://www.bariatricSaber Hacer.[employer] Technologies/item/chewable-advanced-multi-ea  Take 2 chews daily. Additional calcium citrate supplement needed.  - Validation code for Discount on Bariatric Advantage vitamin/mineral supplements: FSWLSBA    OR    Celebrate Multi Complete 45: https://Codility.Water Science Technologies/products/lmrow-ctsybrbr-01?[id number]  Take 2 chews daily. Additional calcium citrate supplement needed.          COMPREHENSIVE WEIGHT MANAGEMENT PROGRAM  VIRTUAL SUPPORT GROUPS    For Support Group Information:      We offer support groups for patients who are working on weight loss and considering, preparing for or have had weight loss surgery.   There is no cost for this opportunity.  You are invited to attend the?Virtual Support Groups?provided by any of the following locations:    [facility] via Infectious Teams with Amy Mendez RN  2.   [place] via  "Double R Group Teams with Donald Obrien, PhD, LP  3.   [place] via Double R Group Teams with Cristin Baltazar RN  4.   [facility] via Double R Group Teams with Cristin Belcher [facility]    The following Support Group information can also be found on our website:  [email or web address]    [email or web address]-loss-[email or web address]-support-groups    [facility] Weight Loss Surgery Support Group    [facility] Weight Loss Surgery Support Group  The support group is a patient-lead forum that meets monthly to share experiences, encouragement and education. It is open to those who have had weight loss surgery, are scheduled for surgery, or are considering surgery.   WHEN: This group meets on the 3rd Wednesday of each month from 5:00PM - 6:00PM virtually using Microsoft Teams.   FACILITATOR: Led by Amy Benavidez RD, WANDA, RN, the program's Clinical Coordinator.   TO REGISTER: Please contact the clinic via [employer]iSync or call the nurse line directly at [phone] to inform our staff that you would like an invite sent to you and to let us know the email you would like the invite sent to. Prior to the meeting, a link with directions on how to join the meeting will be sent to you.    2023 Meetings   April 19: Guest Speaker, Miguel Chambers RD, LD, \"Maintaining Weight Loss after Bariatric Surgery\".  May 17: \"Let's Talk\" a time for the group to share.  June 21: \"Let's Talk\" a time for the group to share.  July 19  August 16  September 20  October 18  November 15  December 20    [facility] Support Groups    [facility] Bariatric Care Support Group?  This is open to all pre- and post- operative bariatric surgery patients as well as their support system.   WHEN: This group meets the 2nd Tuesday of each month from 6:30 PM - 8:00 PM virtually using Microsoft Teams.   FACILITATOR: Led by Donald" "Camille, Ph.D who is a Licensed Psychologist with the [facility] Comprehensive Weight Management Program.   TO REGISTER: Please send an email to Donald Obrien, Ph.D.,  at?[email or web address]?if you would like an invitation to the group and to learn about using Microsoft Teams.    2023 Meetings  April 11: Guest speaker, Christine Contreras MD, Bariatrician, [facility] Management [facility], \"Injectable Weight Loss Medications\".  May 9  Mone 13  July 11  August 8  September 12  October 10  November 14  December 12    [facility] Post-Operative Bariatric Surgery Support Group  This is a support group for [facility] bariatric patients (and those external to [facility]) who have had bariatric surgery and are at least 3 months post-surgery.  WHEN: This support group meets the 4th Wednesday of the month from 11:00 AM - 12:00 PM virtually using Microsoft Teams.   FACILITATOR: Led by Certified Bariatric Nurse, Cristin Baltazar RN.   TO REGISTER: Please send an email to Cristin at [email or web address] if you would like an invitation to the group and to learn about using Microsoft Teams.    2023 Meetings  April 26  May 24  Mone 28  July 26  August 23  September 27  October 25  November 22  December 27    [facility]   Healthy Lifestyle Virtual Support Group    Healthy Lifestyle Virtual Support Group?  This is 60 minutes of small group guided discussion, support and resources. All are welcome who want a healthy lifestyle.  WHEN: This group meets monthly on a Friday from 12:30 PM - 1:30 PM virtually using Microsoft Teams.  This group will meet the first Friday of the month beginning In July  FACILITATOR: Led by National Board Certified Health and , Cristin Belcher [facility].   TO REGISTER: Please send an email to Cristin [email or web address] to receive monthly invites to the group or if you have any questions about having a health " .  Prior to the meeting, a link with directions on how to join the meeting will be sent to you.    2023 Meetings  May 19: Let's Talk  June 9: Create Your Coaching Toolkit: Learn How to  Yourself  July 7: Let's Talk  August 4: Benefits of Fiber with HAYES Hutson  September 1: Show and Tell (share your aps, podcasts, recipes, hacks, books)  October 6 :Let's Talk  November 3: Introduction to Mindfulness   December 1: Let's Talk

## 2023-08-04 NOTE — LETTER
"8/4/2023       RE: Alcides Beauchamp  [address]  [facility] [address]     Dear Colleague,    Thank you for referring your patient, Alcides Beauchamp, to the [facility] [place] at [facility]. Please see a copy of my visit note below.    Phone-Visit Details    Type of service:  Phone Visit    Phone call duration: 26 minutes    Distant Location (provider location): Offsite (providers home) [facility] [place]        Return Bariatric Nutrition Consultation Note    Reason For Visit: Nutrition Assessment    Alcides Beauchamp is a 31 year old presenting today for a return bariatric nutrition consult and nutrition education regarding clear and low-fat full liquid diet for post-bariatric surgery.  Pt is interested in robotic assisted brunilda-en-y gastric bypass with Dr. Sanchez expected surgery in D.  Patient is accompanied by self.  Pt has met required nutrition visits prior to surgery.     Pt referred by ANITHA Johnson on February 15, 2022.  Patient with Co-morbidities of obesity including:  Type II DM no  Renal Failure no  Sleep apnea yes  Hypertension no   Dyslipidemia no  Joint pain no  Back pain no  GERD no         10/21/2021     2:43 PM   Support System Reviewed With Patient   Who do you have in your support network that can be available to help you for the first 2 weeks after surgery? wife and family   Who can you count on for support throughout your weight loss surgery journey? wife and family       ANTHROPOMETRICS:  Initial consult weight (4/27/2022): 307 lbs     Current:   Estimated body mass index is 44.78 kg/m  as calculated from the following:    Height as of this encounter: 1.702 m (5' 7.01\").    Weight as of this encounter: 129.7 kg (286 lb).  (-4 lbs over last 1 month, -21 bs from initial)     Required weight loss goal pre-op: -20 lbs from initial consult weight (goal weight 287 lbs or less before " "surgery)          10/21/2021     2:43 PM   --   I have tried the following methods to lose weight Weight Loss Surgery           10/21/2021     2:43 PM   Weight Loss Questions Reviewed With Patient   How long have you been overweight? Since puberty       SUPPLEMENT INFORMATION:  None     Medications for Weight Loss:  Phentermine, topiramate   Ozempic     NUTRITION HISTORY:  Hx of lap band (2009). S/p band removal. Gained 100 lbs within 6 months of removal. Weight has fluctuated +/- 20 lbs over past 1-2 years. Planning for sleeve gastrectomy with Dr. Sanchez, however noted at his last visit 12/14/23 - \"However if we find anatomically this is challenging we would perform a Homero-en-Y gastric bypass.\"    Pt reports he has been following IF, which has been helping as he has lost 10 lbs in the past 2 weeks. He is eating 1-2 meals daily. Working on cutting out sugar from intake and increase lean protein and veggies at meals.     Today - PT reports running out of his Ozempic, but fortunately has continued to feel positive effects on appetite/satiety and has maintained his weight loss.       Progress on Previous Goals  1) Consume 60 gm protein daily, ideally between 2-3 smaller meals - Met, continues  2) Continue time restricted eating (intermittent fasting) - Met, continues    ADDITIONAL INFORMATION:  Physical activity- moves around a lot at work        10/21/2021     2:43 PM   Dining Out History Reviewed With Patient   How often do you dine out? A couple of times a week.   Where do you dine out? (select all that apply) sit-down restaurants    fast food chains   What types of food do you order when you dine out? shira allen potatoe pop           2/7/2022     1:48 PM   Physical Activity Reviewed With Patient   How often do you exercise? Never   What keeps you from being more active? Lack of Time       NUTRITION DIAGNOSIS:  Obesity r/t long history of positive energy balance aeb BMI >30 kg/m2.    Food- and " Nutrition-related knowledge deficit r/t lack of prior exposure to information AEB pt scheduled for upcoming bariatric surgery and pt interest in diet education/review    INTERVENTION:  Intervention Provided/Education Provided/Reviewed previous goals and encouraged patient to continue goals prior to surgery.     Provided instruction on bariatric clear and low-fat full liquid diets. Provided written/verbal education on Dumping Syndrome and prevention.     Provided the following handouts: Diet Guidelines for Bariatric Surgery, Your Stage 1-5 Diet, Keeping Track of Your Fluids, list of recommended vitamin/mineral supplementation after sleeve gastrectomy surgery and RD contact information. Sent to pt email ([email or web address]) per pt request.           10/21/2021     2:43 PM   Questions Reviewed With Patient   How ready are you to make changes regarding your weight? Number 1 = Not ready at all to make changes up to 10 = very ready. 10   How confident are you that you can change? 1 = Not confident that you will be successful making changes up to 10 = very confident. 10       Expected Engagement: good    Goals after surgery:   1. Follow the bariatric post-op diet advancement schedule (see below)  2. Sip on 48-64 oz (or greater) fluids daily, recording intake to help stay on-track.  - Drink at least 1-2 oz of fluid every 15-30 min.  3. Stop vitamins/minerals 1 week before surgery. We will discuss starting a chewable multivitamin at the one week post-op visit.   4. Work towards consuming 60 gm protein daily.     Post-op Diet Advancement Schedule:  Clear Liquid Diet (stage 1): starts day before surgery (lasts for 1 week)  Low-Fat Full Liquid Diet (stage 2): starts one week after surgery (last for 1 week)  Pureed Diet (stage 3): starts 2 weeks after surgery (lasts 2 weeks)  Soft Diet (stage 4): starts 4 weeks after surgery (lasts 4 weeks)  Regular Diet (stage 5): starts 8 weeks after surgery      Post-op Diet  Handouts:  Diet Guidelines after Weight-loss Surgery  [email or web address]     Your Stage 1 Diet: Clear Liquids  [email or web address]     Your Stage 2 Diet: Low-fat Full Liquids  [email or web address]     Your Stage 3 Diet: Pureed Foods  [email or web address]     Pureed Recipes  [email or web address]    Your Stage 4 Diet: Soft Foods  [email or web address]    Your Stage 5 Diet: Regular Foods  [email or web address]    Supplements after Sleeve Gastrectomy, Gastric Bypass or Single Anastomosis Duodenal Switch  [email or web address]    Keeping Track of Fluids  [email or web address]    After Weight Loss Surgery    Preventing Dumping Syndrome after Weight Loss Surgery  [email or web address]     Preventing Low Blood Sugar after Weight Loss Surgery  [email or web address]     Protein Supplements for After Surgery:   Unflavored protein powder: Genepro, Isopure (25-30 gm protein per 1 scoop)  You may also use flavored protein powder if you prefer.   Protein shots: https://store.[email or web address].com/collections/protein-shots  Pre-made protein shakes (no more than 210 Calories, at least 20 grams of protein, and less than 10 grams of sugar):   Premier Protein (160 Calories, 30 g protein)  Boost/Ensure Max (160 calories, 30 gm protein)   Fairlife Core Power (170 calories, 26 gm protein)  Aldi's Elevation Protein Shake (160 calories, 30 gm protein)   Equate Protein Shake (160 calories, 30 gm protein)  Slim Fast Advanced Nutrition (180 Calories, 20 g protein)  Muscle Milk, lactose-free, 17 oz bottle (210 Calories, 30 g protein)  Glucerna Protein Smart (150 vera, 30 gm protein)  Clear Protein Drinks:  BiPro  Premier Protein clear  [id number]  M Health Protein 15 Concentrate  Bone broth  Beneprotein protein powder mixed with 4-6 oz of fluid  [id number]    Chewable Multivitamin Options for After Surgery:  Bariatric Advantage Advanced Multi EA  chewable: https://www.bariatricadvantage.com/item/chewable-advanced-multi-ea  Take 2 chews daily. Additional calcium citrate supplement needed.  - Validation code for Discount on Bariatric Advantage vitamin/mineral supplements: FSWLSBA    OR    Celebrate Multi Complete 45: https://[employer]/products/afnpi-yglljuvx-46?uaohhxv=[email or web address]  Take 2 chews daily. Additional calcium citrate supplement needed.      Follow up: 1 month, or 1 week post-op    Time spent with patient: 26 minutes.  Lisa Rg, HAYES, LD

## 2023-08-14 ENCOUNTER — CARE COORDINATION (OUTPATIENT)
Dept: ENDOCRINOLOGY | Facility: CLINIC | Age: 33
End: 2023-08-14
Payer: COMMERCIAL

## 2023-08-14 DIAGNOSIS — E66.01 MORBID OBESITY (H): Primary | ICD-10-CM

## 2023-08-14 NOTE — TELEPHONE ENCOUNTER
Reviewing chart.  Has [employer] [place], asked patient if he was told to do consecutive RD visits.  He states that they were not always available when he called to schedule and that he was not informed that they be consecutive.  Six visits were not required when he started program with us.     To submit documentation to see if BCBS will approve if non consecutive RD visits , due to being > 6 months.  Pt willing to take that risk.  RD VISIT: 8/4/23, 6/13/23, 7/14/23 (phone visit), 4/21/23, 3/31/23, 12/20/23, 11/14/22, 10/7/22, 6/2/22, 5/2/22, 4/1/22, 3/9/22, 2/16/22  As of 1/1/23 BCBS Criteria states,  Patient has demonstrated at least 6 months of ongoing adherence to recommended behavior and nutrition modifications.   Weight is 289 lbs, goal weight 290 lbs, Initial weight 307 lbs.  Has goals with RD visits.  Has seen Buffy Forbes 6/29/23, 3/29/23, 12/8/22, 7/13/22, 4/7/22    Plan for robotic assisted brunilda-en-Y gastric bypass with Dr Sanchez  (visit 12/14/22, 4/27/22, 7/19/23 see Felicitas Lyman note)  4/13/22 EGD w Dr Sanchez    Use the 8/16/22 Dr Armstrong psychological evaluation.    PCP is Sanjay Farrell MD 10/5/22 in [place], scanned 10/15/23  ([facility])    TSH level ordered.

## 2023-09-05 ENCOUNTER — TELEPHONE (OUTPATIENT)
Dept: ENDOCRINOLOGY | Facility: CLINIC | Age: 33
End: 2023-09-05
Payer: COMMERCIAL

## 2023-09-05 NOTE — TELEPHONE ENCOUNTER
"Patient called very upset that we did not get the prior authorization for a Homero-en-Y from [facility]. I told him I had checked the insurance when he started our program and there was coverage. Received note states \"no benefit.\" Patient states he is very upset with the communication aspect of  Health and hung up on me. His wife later called asking to communicate with her rather than her , Ольга, at [phone]. I told her I would contact the ins co to find out what the issue is.   "

## 2023-10-13 ENCOUNTER — TELEPHONE (OUTPATIENT)
Dept: ENDOCRINOLOGY | Facility: CLINIC | Age: 33
End: 2023-10-13
Payer: COMMERCIAL

## 2023-10-13 NOTE — TELEPHONE ENCOUNTER
"Patient's wife, Halina, called to discuss insurance coverage. States her  had a test today which shows he now has fatty liver disease.  I explained to her that I had submitted to [facility] for a Homero-en-Y and got back a letter stating there was \"no benefit.\" She said they pay a premium for this insurance and was told it has the very best coverage. I told her I would call [facility] again today to check if there as a mistake.[facility] states there is \"no benefit\" for bariatric surgery on the policy. I will call Halina to let her know. I would encourage her or her  to contact Member Services phone number on the back of the card if they want to speak to someone else regarding policy coverage.  The only way patient will get bariatric surgery is if he changes insurance companies when it is open enrollment.   "

## 2024-03-09 ENCOUNTER — HEALTH MAINTENANCE LETTER (OUTPATIENT)
Age: 34
End: 2024-03-09

## 2025-03-16 ENCOUNTER — HEALTH MAINTENANCE LETTER (OUTPATIENT)
Age: 35
End: 2025-03-16

## (undated) RX ORDER — FENTANYL CITRATE 50 UG/ML
INJECTION, SOLUTION INTRAMUSCULAR; INTRAVENOUS
Status: DISPENSED
Start: 2021-10-07

## (undated) RX ORDER — FENTANYL CITRATE 50 UG/ML
INJECTION, SOLUTION INTRAMUSCULAR; INTRAVENOUS
Status: DISPENSED
Start: 2022-04-13